# Patient Record
Sex: FEMALE | Race: WHITE | NOT HISPANIC OR LATINO | Employment: FULL TIME | URBAN - METROPOLITAN AREA
[De-identification: names, ages, dates, MRNs, and addresses within clinical notes are randomized per-mention and may not be internally consistent; named-entity substitution may affect disease eponyms.]

---

## 2018-01-15 NOTE — PROGRESS NOTES
Assessment    1  Encounter for preventive health examination (V70 0) (Z00 00)   2  Diabetes mellitus (250 00) (E11 9)    Discussion/Summary    Patient is cleared for driving school bus  She will need diabetic waiver  Chief Complaint  School bus PE      History of Present Illness  HPI: A she comes in for  physical  She is diabetic but is off all medications since losing over 50 pounds in the last 3 months after having gastric sleeve surgery  Review of Systems    Constitutional: No fever, no chills, feels well, no tiredness, no recent weight gain or weight loss  Cardiovascular: No complaints of slow heart rate, no fast heart rate, no chest pain, no palpitations, no leg claudication, no lower extremity edema  Respiratory: No complaints of shortness of breath, no wheezing, no cough, no SOB on exertion, no orthopnea, no PND  Gastrointestinal: No complaints of abdominal pain, no constipation, no nausea or vomiting, no diarrhea, no bloody stools  Surgical History    · History of  Section   · History of Gastrectomy Sleeve Laparoscopic    Family History  Mother    · Family history of mental disorder (V17 0) (Z81 8)   · Family history of substance abuse (V17 0) (Z81 4)    Social History    · Always uses seat belt   · Does not use illicit drugs (Z00 34) (Z78 9)   · Employed   · Exercises regularly   · Former smoker (P55 26) (P73 458)   · Lives with spouse   ·    · No alcohol use   · Primary spoken language English    Current Meds   1  No Reported Medications Recorded    Vitals   Recorded: [id number] 05:54PM   Heart Rate 68   Systolic 999   Diastolic 80   Height 5 ft 4 in   Weight 173 lb    BMI Calculated 29 7   BSA Calculated 1 85   O2 Saturation 98     Physical Exam    Constitutional   General appearance: No acute distress, well appearing and well nourished  Eyes   Conjunctiva and lids: No swelling, erythema or discharge      Pupils and irises: Equal, round, reactive to light     Ophthalmoscopic examination: Normal fundi and optic discs  Ears, Nose, Mouth, and Throat   External inspection of ears and nose: Normal     Otoscopic examination: Tympanic membranes translucent with normal light reflex  Canals patent without erythema  Hearing: Normal     Lips, teeth, and gums: Normal, good dentition  Oropharynx: Normal with no erythema, edema, exudate or lesions  Neck   Neck: Supple, symmetric, trachea midline, no masses  Thyroid: Normal, no thyromegaly  Pulmonary   Respiratory effort: No increased work of breathing or signs of respiratory distress  Auscultation of lungs: Clear to auscultation  Cardiovascular   Auscultation of heart: Normal rate and rhythm, normal S1 and S2, no murmurs  Abdominal aorta: Normal     Pedal pulses: 2+ bilaterally  Examination of extremities for edema and/or varicosities: Normal     Chest   Chest: Normal     Abdomen   Abdomen: Non-tender, no masses  Liver and spleen: No hepatomegaly or splenomegaly  Genitourinary   Uterus: Normal size, no tenderness, no masses  Adnexa/Parametria: Normal, no masses or tenderness  Lymphatic   Palpation of lymph nodes in neck: No lymphadenopathy  Palpation of lymph nodes in other areas: No lymphadenopathy  Musculoskeletal   Gait and station: Normal     Digits and nails: Normal without clubbing or cyanosis  Joints, bones, and muscles: Normal     Range of motion: Normal     Stability: Normal     Muscle strength/tone: Normal     Skin   Skin and subcutaneous tissue: Normal without rashes or lesions  Palpation of skin and subcutaneous tissue: Normal turgor  Neurologic   Cranial nerves: Cranial nerves II-XII intact  Reflexes: 2+ and symmetric  Sensation: No sensory loss  Psychiatric   Judgment and insight: Normal     Orientation to person, place, and time: Normal     Recent and remote memory: Intact      Mood and affect: Normal        Procedure    Procedure:   Results: 20/15 in both eyes without corrective device, 20/20 in the right eye without corrective device, 20/20 in the left eye without corrective device      Signatures   Electronically signed by : ALEXX Owen ; Dec 20 2016  6:19PM EST                       (Author)

## 2018-01-22 ENCOUNTER — ALLSCRIPTS OFFICE VISIT (OUTPATIENT)
Dept: OTHER | Facility: OTHER | Age: 33
End: 2018-01-22

## 2018-01-24 NOTE — PROGRESS NOTES
Assessment    1  Physical examination of employee (V70 5) (Z02 89)    Plan  Physical examination of employee    · Follow-up visit in 1 year Evaluation and Treatment  Follow-up  Status: Hold For -  Scheduling  Requested for: [id number]    Discussion/Summary  healthy adult female Currently, she eats a healthy diet and has an adequate exercise regimen  cervical cancer screening is current Advice and education were given regarding aerobic exercise, calcium supplements, vitamin D supplements and seat belt use  Patient discussion: discussed with the patient  Patient is cleared for driving  Her forms are completed  Of note she had her diabetic and high waiver with her  Those are copy done with her chart  The patient was counseled regarding diagnostic results, instructions for management, risk factor reductions, impressions, risks and benefits of treatment options, importance of compliance with treatment  The treatment plan was reviewed with the patient/guardian  The patient/guardian understands and agrees with the treatment plan     Self Referrals: Yes Endocrinologist      Chief Complaint  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  History of Present Illness  HM, Adult Female: The patient is being seen for a health maintenance evaluation  Social History: Household members include spouse, 3 daughter(s) and 2 son(s)  She is   Work status: working full time  The patient has never smoked cigarettes  She reports never drinking alcohol  She has never used illicit drugs  General Health: The patient's health since the last visit is described as good  She has regular dental visits  She complains of vision problems  Vision care includes having regular eye examinations  Lifestyle:  She consumes a diverse and healthy diet  She exercises regularly  She does not use tobacco  She consumes alcohol  She denies drug use  Reproductive health: the patient is premenopausal   she uses contraception   For contraception, she uses abstinence  breast feeding  Screening:   HPI: 75-year-old female here today for  physical examination  She is off all medications for diabetes at this time  She had a gastric sleeve done  She still sees her endocrinologist had a recent hemoglobin A1c of 6 0  She had a recent eye exam with no diabetic retinopathy  Review of Systems    Constitutional: No fever, no chills, feels well, no tiredness, no recent weight gain or weight loss  Eyes: No complaints of eye pain, no red eyes, no eyesight problems, no discharge, no dry eyes, no itching of eyes  ENT: no complaints of earache, no loss of hearing, no nose bleeds, no nasal discharge, no sore throat, no hoarseness  Cardiovascular: No complaints of slow heart rate, no fast heart rate, no chest pain, no palpitations, no leg claudication, no lower extremity edema  Respiratory: No complaints of shortness of breath, no wheezing, no cough, no SOB on exertion, no orthopnea, no PND  Gastrointestinal: No complaints of abdominal pain, no constipation, no nausea or vomiting, no diarrhea, no bloody stools  Genitourinary: No complaints of dysuria, no incontinence, no pelvic pain, no dysmenorrhea, no vaginal discharge or bleeding  Musculoskeletal: No complaints of arthralgias, no myalgias, no joint swelling or stiffness, no limb pain or swelling  Integumentary: No complaints of skin rash or lesions, no itching, no skin wounds, no breast pain or lump  Neurological: No complaints of headache, no confusion, no convulsions, no numbness, no dizziness or fainting, no tingling, no limb weakness, no difficulty walking  Psychiatric: Not suicidal, no sleep disturbance, no anxiety or depression, no change in personality, no emotional problems  Endocrine: No complaints of proptosis, no hot flashes, no muscle weakness, no deepening of the voice, no feelings of weakness     Hematologic/Lymphatic: No complaints of swollen glands, no swollen glands in the neck, does not bleed easily, does not bruise easily  ROS reviewed  Active Problems    1  Diabetes mellitus (250 00) (E11 9)    Surgical History    · History of  Section   · History of Gastrectomy Sleeve Laparoscopic    Family History  Mother    · Family history of mental disorder (V17 0) (Z81 8)   · Family history of substance abuse (V17 0) (Z81 4)    Social History    · Always uses seat belt   · Does not use illicit drugs (I96 27) (Z78 9)   · Employed   · Exercises regularly   · Former smoker (I02 17) (L49 531)   · Lives with spouse   ·    · No alcohol use   · Primary spoken language English    Current Meds   1  No Reported Medications Recorded    Allergies    1  No Known Drug Allergies    Vitals   Recorded: [id number] 04:24PM   Heart Rate 85   Systolic 915   Diastolic 80   Height 5 ft 4 in   Weight 163 lb    BMI Calculated 27 98   BSA Calculated 1 79   O2 Saturation 98     Physical Exam    Constitutional   General appearance: No acute distress, well appearing and well nourished  Eyes   Conjunctiva and lids: No swelling, erythema or discharge  Pupils and irises: Equal, round and reactive to light  Ears, Nose, Mouth, and Throat   External inspection of ears and nose: Normal     Otoscopic examination: Tympanic membranes translucent with normal light reflex  Canals patent without erythema  Oropharynx: Normal with no erythema, edema, exudate or lesions  Pulmonary   Respiratory effort: No increased work of breathing or signs of respiratory distress  Auscultation of lungs: Clear to auscultation  Cardiovascular   Auscultation of heart: Normal rate and rhythm, normal S1 and S2, without murmurs  Examination of extremities for edema and/or varicosities: Normal     Abdomen   Abdomen: Non-tender, no masses  Liver and spleen: No hepatomegaly or splenomegaly  Lymphatic   Palpation of lymph nodes in neck: No lymphadenopathy      Musculoskeletal   Gait and station: Normal  Digits and nails: Normal without clubbing or cyanosis  Inspection/palpation of joints, bones, and muscles: Normal     Skin   Skin and subcutaneous tissue: Normal without rashes or lesions  Neurologic   Cranial nerves: Cranial nerves 2-12 intact  Reflexes: 2+ and symmetric  Sensation: No sensory loss      Psychiatric   Orientation to person, place, and time: Normal     Mood and affect: Normal        Procedure    Procedure:   Results: 20/20 in both eyes with corrective device, 20/20 in the right eye with corrective device, 20/20 in the left eye with corrective device      Signatures   Electronically signed by : IRMA Gleason; Jan 22 2018  4:43PM EST                       (Author)    Electronically signed by : ALEXX Ny ; Jan 22 2018  9:03PM EST

## 2019-01-14 ENCOUNTER — OFFICE VISIT (OUTPATIENT)
Dept: FAMILY MEDICINE CLINIC | Facility: CLINIC | Age: 34
End: 2019-01-14

## 2019-01-14 VITALS
TEMPERATURE: 98.3 F | DIASTOLIC BLOOD PRESSURE: 64 MMHG | HEART RATE: 95 BPM | OXYGEN SATURATION: 98 % | WEIGHT: 147 LBS | HEIGHT: 64 IN | RESPIRATION RATE: 16 BRPM | BODY MASS INDEX: 25.1 KG/M2 | SYSTOLIC BLOOD PRESSURE: 100 MMHG

## 2019-01-14 DIAGNOSIS — Z00.00 HEALTH MAINTENANCE EXAMINATION: Primary | ICD-10-CM

## 2019-01-14 PROCEDURE — 99499CD: Performed by: FAMILY MEDICINE

## 2019-01-14 NOTE — PROGRESS NOTES
Assessment/Plan:           Problem List Items Addressed This Visit     Health maintenance examination - Primary            Subjective:      Patient ID: Raul Mckeon is a 26 y o  female  Patient comes in for  physical   Since she was here list she has had gastric bypass and has lost a total of 100 lb  The following portions of the patient's history were reviewed and updated as appropriate: allergies, current medications, past family history, past medical history, past social history, past surgical history and problem list     Review of Systems   Constitutional: Negative  HENT: Negative  Respiratory: Negative  Cardiovascular: Negative  Objective:      /64   Pulse 95   Temp 98 3 °F (36 8 °C)   Resp 16   Ht 5' 4" (1 626 m)   Wt 66 7 kg (147 lb)   SpO2 98%   BMI 25 23 kg/m²          Physical Exam   Constitutional: She is oriented to person, place, and time  She appears well-developed  HENT:   Head: Normocephalic and atraumatic  Right Ear: External ear normal    Left Ear: External ear normal    Mouth/Throat: Oropharynx is clear and moist    Eyes: Pupils are equal, round, and reactive to light  EOM are normal    Neck: Neck supple  Cardiovascular: Normal rate, regular rhythm and normal heart sounds  Pulmonary/Chest: Effort normal and breath sounds normal    Abdominal: Soft  Musculoskeletal: Normal range of motion  Neurological: She is alert and oriented to person, place, and time  Skin: Skin is warm and dry  Psychiatric: She has a normal mood and affect

## 2019-10-26 LAB
CREAT ?TM UR-SCNC: 271 UMOL/L
EXT MICROALBUMIN URINE RANDOM: 3.8
EXTERNAL HIV CONFIRMATION: NORMAL
EXTERNAL HIV SCREEN: NORMAL
HCV AB SER-ACNC: NON REACTIVE
MICROALBUMIN/CREAT UR: 14 MG/G{CREAT}

## 2019-12-30 ENCOUNTER — TRANSCRIBE ORDERS (OUTPATIENT)
Dept: LAB | Facility: CLINIC | Age: 34
End: 2019-12-30

## 2019-12-30 ENCOUNTER — APPOINTMENT (OUTPATIENT)
Dept: LAB | Facility: CLINIC | Age: 34
End: 2019-12-30

## 2019-12-30 DIAGNOSIS — Z01.812 PRE-OPERATIVE LABORATORY EXAMINATION: ICD-10-CM

## 2019-12-30 DIAGNOSIS — Z01.812 PRE-OPERATIVE LABORATORY EXAMINATION: Primary | ICD-10-CM

## 2019-12-30 LAB
APTT PPP: 27 SECONDS (ref 23–37)
BASOPHILS # BLD AUTO: 0.05 THOUSANDS/ΜL (ref 0–0.1)
BASOPHILS NFR BLD AUTO: 1 % (ref 0–1)
EOSINOPHIL # BLD AUTO: 0.14 THOUSAND/ΜL (ref 0–0.61)
EOSINOPHIL NFR BLD AUTO: 2 % (ref 0–6)
ERYTHROCYTE [DISTWIDTH] IN BLOOD BY AUTOMATED COUNT: 19.5 % (ref 11.6–15.1)
HCT VFR BLD AUTO: 39.4 % (ref 34.8–46.1)
HGB BLD-MCNC: 11.1 G/DL (ref 11.5–15.4)
IMM GRANULOCYTES # BLD AUTO: 0.01 THOUSAND/UL (ref 0–0.2)
IMM GRANULOCYTES NFR BLD AUTO: 0 % (ref 0–2)
INR PPP: 0.94 (ref 0.84–1.19)
LYMPHOCYTES # BLD AUTO: 3.3 THOUSANDS/ΜL (ref 0.6–4.47)
LYMPHOCYTES NFR BLD AUTO: 49 % (ref 14–44)
MCH RBC QN AUTO: 21.3 PG (ref 26.8–34.3)
MCHC RBC AUTO-ENTMCNC: 28.2 G/DL (ref 31.4–37.4)
MCV RBC AUTO: 76 FL (ref 82–98)
MONOCYTES # BLD AUTO: 0.59 THOUSAND/ΜL (ref 0.17–1.22)
MONOCYTES NFR BLD AUTO: 9 % (ref 4–12)
NEUTROPHILS # BLD AUTO: 2.59 THOUSANDS/ΜL (ref 1.85–7.62)
NEUTS SEG NFR BLD AUTO: 39 % (ref 43–75)
NRBC BLD AUTO-RTO: 0 /100 WBCS
PLATELET # BLD AUTO: 410 THOUSANDS/UL (ref 149–390)
PMV BLD AUTO: 10.3 FL (ref 8.9–12.7)
PROTHROMBIN TIME: 12.2 SECONDS (ref 11.6–14.5)
RBC # BLD AUTO: 5.22 MILLION/UL (ref 3.81–5.12)
WBC # BLD AUTO: 6.68 THOUSAND/UL (ref 4.31–10.16)

## 2019-12-30 PROCEDURE — 85730 THROMBOPLASTIN TIME PARTIAL: CPT

## 2019-12-30 PROCEDURE — 36415 COLL VENOUS BLD VENIPUNCTURE: CPT

## 2019-12-30 PROCEDURE — 85025 COMPLETE CBC W/AUTO DIFF WBC: CPT

## 2019-12-30 PROCEDURE — 85610 PROTHROMBIN TIME: CPT

## 2020-01-06 ENCOUNTER — OFFICE VISIT (OUTPATIENT)
Dept: FAMILY MEDICINE CLINIC | Facility: CLINIC | Age: 35
End: 2020-01-06

## 2020-01-06 VITALS
SYSTOLIC BLOOD PRESSURE: 100 MMHG | HEART RATE: 69 BPM | TEMPERATURE: 98 F | DIASTOLIC BLOOD PRESSURE: 62 MMHG | HEIGHT: 64 IN | WEIGHT: 147 LBS | BODY MASS INDEX: 25.1 KG/M2 | OXYGEN SATURATION: 100 %

## 2020-01-06 DIAGNOSIS — Z02.89 ENCOUNTER FOR PHYSICAL EXAMINATION RELATED TO EMPLOYMENT: Primary | ICD-10-CM

## 2020-01-06 PROBLEM — F31.9 BIPOLAR AFFECTIVE (HCC): Status: ACTIVE | Noted: 2018-04-20

## 2020-01-06 PROBLEM — E11.42 DIABETIC POLYNEUROPATHY (HCC): Status: ACTIVE | Noted: 2018-10-12

## 2020-01-06 PROBLEM — E78.5 HYPERLIPIDEMIA LDL GOAL <70: Status: RESOLVED | Noted: 2018-04-20 | Resolved: 2020-01-06

## 2020-01-06 PROBLEM — E55.9 VITAMIN D DEFICIENCY: Status: ACTIVE | Noted: 2018-04-20

## 2020-01-06 PROBLEM — K21.00 GASTROESOPHAGEAL REFLUX DISEASE WITH ESOPHAGITIS: Status: RESOLVED | Noted: 2018-04-12 | Resolved: 2020-01-06

## 2020-01-06 PROBLEM — E11.9 CONTROLLED TYPE 2 DIABETES MELLITUS WITHOUT COMPLICATION, WITHOUT LONG-TERM CURRENT USE OF INSULIN (HCC): Status: ACTIVE | Noted: 2019-10-21

## 2020-01-06 PROBLEM — E78.5 HYPERLIPIDEMIA LDL GOAL <70: Status: ACTIVE | Noted: 2018-04-20

## 2020-01-06 PROBLEM — E80.4 GILBERT'S SYNDROME: Status: ACTIVE | Noted: 2019-11-23

## 2020-01-06 PROBLEM — K21.00 GASTROESOPHAGEAL REFLUX DISEASE WITH ESOPHAGITIS: Status: ACTIVE | Noted: 2018-04-12

## 2020-01-06 PROCEDURE — 99499 UNLISTED E&M SERVICE: CPT | Performed by: PHYSICIAN ASSISTANT

## 2020-01-06 RX ORDER — PHENAZOPYRIDINE HYDROCHLORIDE 200 MG/1
TABLET, FILM COATED ORAL
Refills: 0 | COMMUNITY
Start: 2019-11-10 | End: 2020-01-06 | Stop reason: ALTCHOICE

## 2020-01-06 RX ORDER — ERGOCALCIFEROL 1.25 MG/1
CAPSULE ORAL
Refills: 0 | COMMUNITY
Start: 2019-12-21

## 2020-01-06 RX ORDER — SELENIUM SULFIDE 22.5 MG/ML
SHAMPOO TOPICAL
Refills: 1 | COMMUNITY
Start: 2019-10-22 | End: 2020-01-06 | Stop reason: ALTCHOICE

## 2020-01-06 RX ORDER — CALCIUM CARBONATE/VITAMIN D3 600 MG-20
1 TABLET ORAL DAILY
Refills: 3 | COMMUNITY
Start: 2019-12-21 | End: 2020-01-06 | Stop reason: ALTCHOICE

## 2020-01-06 RX ORDER — ASCORBIC ACID 250 MG
TABLET ORAL
Refills: 11 | COMMUNITY
Start: 2019-12-16 | End: 2022-04-18

## 2020-01-06 RX ORDER — FERROUS SULFATE 325(65) MG
TABLET ORAL
Refills: 3 | COMMUNITY
Start: 2019-12-16

## 2020-01-06 RX ORDER — PREGABALIN 150 MG/1
150 CAPSULE ORAL 2 TIMES DAILY
Refills: 5 | COMMUNITY
Start: 2019-12-24 | End: 2022-01-31

## 2020-01-06 NOTE — PROGRESS NOTES
Assessment/Plan:          Diagnoses and all orders for this visit:    Encounter for physical examination related to employment    Other orders  -     [facility] VITAMIN C 250 MG tablet; TAKE 1 TABLET BY MOUTH TWICE A DAY WITH IRON  -     Discontinue: [facility] D3 50 MCG (2000 UT) CAPS; Take 1 capsule by mouth daily  -     ergocalciferol (VITAMIN D2) 50,000 units; TAKE 1 CAPSULE BY MOUTH ONE TIME PER WEEK  -     ferrous sulfate 325 (65 Fe) mg tablet; TAKE 1 TABLET BY MOUTH TWICE A DAY WITH A MEAL AND VITAMIN C 250 MG  -     Discontinue: phenazopyridine (PYRIDIUM) 200 mg tablet; TAKE 1 TABLET (200 MG TOTAL) BY MOUTH 3 (THREE) TIMES A DAY AS NEEDED FOR BLADDER SPASMS  -     pregabalin (LYRICA) 150 mg capsule; Take 150 mg by mouth 2 (two) times a day  -     Discontinue: Selenium Sulfide 2 25 % SHAM; LATHER UP AND LEAVE ON FOR 10 MINUTES PRIOR TO WASHING OFF ONCE DAILY FOR A WEEK, REPEAT IF OCCURS            Subjective:      Patient ID: Loyde Hodgkin is a 37 y o  female  Pt is here for  PE  She is no longer on any medications for Dm, Hyperlipidemia or GERD  Patient had gastric bypass surgery and loss significant weight  She denies any chest pain, shortness of breath or dizziness  The following portions of the patient's history were reviewed and updated as appropriate:   She has a past medical history of Diabetes mellitus ([facility] [address] )  ,  does not have any pertinent problems on file  ,   has a past surgical history that includes Gastric bypass and  section  ,  family history includes Drug abuse in her mother; Mental illness in her mother  ,   reports that she has quit smoking  She has never used smokeless tobacco  She reports that she does not drink alcohol or use drugs  ,  has No Known Allergies     Current Outpatient Medications   Medication Sig Dispense Refill    [facility]S VITAMIN C 250 MG tablet TAKE 1 TABLET BY MOUTH TWICE A DAY WITH IRON  11    ergocalciferol (VITAMIN D2) 50,000 units TAKE 1 CAPSULE BY MOUTH ONE TIME PER WEEK  0    pregabalin (LYRICA) 150 mg capsule Take 150 mg by mouth 2 (two) times a day  5    ferrous sulfate 325 (65 Fe) mg tablet TAKE 1 TABLET BY MOUTH TWICE A DAY WITH A MEAL AND VITAMIN C 250 MG  3     No current facility-administered medications for this visit  Review of Systems   Constitutional: Negative for activity change, appetite change, chills, fatigue and fever  HENT: Negative for congestion, dental problem, facial swelling, postnasal drip, rhinorrhea, sinus pressure, sore throat and trouble swallowing  Eyes: Negative for pain, discharge and visual disturbance  Respiratory: Negative for cough, chest tightness and shortness of breath  Cardiovascular: Negative for chest pain, palpitations and leg swelling  Gastrointestinal: Negative for abdominal pain, constipation, diarrhea and nausea  Genitourinary: Negative for decreased urine volume, difficulty urinating, dysuria and flank pain  Musculoskeletal: Negative for back pain, gait problem and neck pain  Skin: Negative for rash  Neurological: Negative for dizziness, tremors, syncope, weakness, light-headedness and headaches  Psychiatric/Behavioral: Negative for confusion, decreased concentration, self-injury, sleep disturbance and suicidal ideas  The patient is not nervous/anxious  Objective:  Vitals:    01/06/20 1646   BP: 100/62   Pulse: 69   Temp: 98 °F (36 7 °C)   SpO2: 100%   Weight: 66 7 kg (147 lb)   Height: 5' 4 17" (1 63 m)     Body mass index is 25 1 kg/m²  Physical Exam   Constitutional: She is oriented to person, place, and time  She appears well-developed and well-nourished  HENT:   Head: Normocephalic     Right Ear: Hearing, tympanic membrane, external ear and ear canal normal    Left Ear: Hearing, tympanic membrane and external ear normal    Nose: Nose normal        Mouth/Throat: Uvula is midline, oropharynx is clear and moist and mucous membranes are normal    Eyes: Pupils are equal, round, and reactive to light  Conjunctivae, EOM and lids are normal    Peripheral vision is normal    Neck: Normal range of motion  No spinous process tenderness present  Carotid bruit is not present  No neck rigidity  No thyromegaly present  Cardiovascular: Normal rate, regular rhythm, normal heart sounds and intact distal pulses  Pulmonary/Chest: Effort normal and breath sounds normal    Abdominal: Soft  Bowel sounds are normal  She exhibits no mass  There is no tenderness  Musculoskeletal: Normal range of motion  Lymphadenopathy:     She has no cervical adenopathy  Neurological: She is alert and oriented to person, place, and time  She has normal reflexes  She displays normal reflexes  She exhibits normal muscle tone  Coordination normal    Skin: Skin is dry  No rash noted  No erythema  Psychiatric: She has a normal mood and affect  Her behavior is normal  Judgment and thought content normal    Nursing note and vitals reviewed

## 2020-01-08 ENCOUNTER — ANESTHESIA EVENT (OUTPATIENT)
Dept: PERIOP | Facility: HOSPITAL | Age: 35
End: 2020-01-08
Payer: SELF-PAY

## 2020-01-08 NOTE — PRE-PROCEDURE INSTRUCTIONS
Pre-Surgery Instructions:   Medication Instructions    pregabalin (LYRICA) 150 mg capsule Instructed patient per Anesthesia Guidelines  Pre-op Showering Instructions for Surgery Patients    Before your operation, you play an important role in decreasing your risk for infection by washing with special antiseptic soap  This is an effective way to reduce bacteria on the skin which may help to prevent infections at the surgical site  Please read the following directions in advance  1  In the week before your operation, purchase a 4 ounce bottle of antiseptic soap containing chlorhexidine gluconate (CHG)  4%  Some brand names include: Aplicare®, Endure, and Hibiclens®  The cost is usually less than $5 00   For your convenience, the [facility] carries the soap   It may also be available at your doctors office or pre-admission testing center, and at most retail pharmacies   If you are allergic or sensitive to soaps containing CHG, please let your doctor know so another antiseptic can be suggested   CHG antiseptic soap is for external use only  2  The day before your operation, follow these instructions carefully to get ready   Please clean linens (sheets) on your bed; you should sleep on clean sheets after your evening shower   Get clean towels and washcloth ready - you need enough for 2 showers   Set aside clean underwear, pajamas, and clothing to wear after the showers     Reminders:   DO NOT use any other soap or body rinse on your skin during or after the antiseptic showers   DO NOT use lotion, powder, deodorant, or perfume/aftershave of any kind on your skin after your antiseptic shower   DO NOT shave any body parts in the 24 hours/day before your operation   DO NOT get the antiseptic soap in your eyes, ears, nose, mouth, or vaginal area    3   You will need to shower the night before AND the morning of your surgery  Shower 1:   The first evening before the operation, take the first shower   First, shampoo your hair with regular shampoo and rinse it completely before you use the antiseptic soap  After washing and rinsing your hair, rinse your body   Next, use a clean washcloth to apply the antiseptic soap and wash your body from the neck down to your toes using ½ bottle of the antiseptic soap   You will use the other ½ bottle for the second shower   Clean the area where your incision will be; lather this area well for about 2 minutes   If you are having head or neck surgery, wash areas with the antiseptic soap   Rinse yourself completely with running water   Use a clean towel to dry off   Wear clean underwear and clothing/pajamas  Shower 2   The morning of your operation, take the second shower following the same steps as Shower 1 using the second ½ of the bottle of antiseptic soap   Use clean cloths and towels to wash and dry yourself   Wear clean underwear and clothing

## 2020-01-08 NOTE — PRE-PROCEDURE INSTRUCTIONS
Pre-Surgery Instructions:   Medication Instructions    pregabalin (LYRICA) 150 mg capsule Instructed patient per Anesthesia Guidelines  Pre-op Showering Instructions for Surgery Patients    Before your operation, you play an important role in decreasing your risk for infection by washing with special antiseptic soap  This is an effective way to reduce bacteria on the skin which may help to prevent infections at the surgical site  Please read the following directions in advance  1  In the week before your operation, purchase a 4 ounce bottle of antiseptic soap containing chlorhexidine gluconate (CHG)  4%  Some brand names include: Aplicare®, Endure, and Hibiclens®  The cost is usually less than $5 00   For your convenience, the [employer] carries the soap   It may also be available at your doctors office or pre-admission testing center, and at most retail pharmacies   If you are allergic or sensitive to soaps containing CHG, please let your doctor know so another antiseptic can be suggested   CHG antiseptic soap is for external use only  2  The day before your operation, follow these instructions carefully to get ready   Please clean linens (sheets) on your bed; you should sleep on clean sheets after your evening shower   Get clean towels and washcloth ready - you need enough for 2 showers   Set aside clean underwear, pajamas, and clothing to wear after the showers     Reminders:   DO NOT use any other soap or body rinse on your skin during or after the antiseptic showers   DO NOT use lotion, powder, deodorant, or perfume/aftershave of any kind on your skin after your antiseptic shower   DO NOT shave any body parts in the 24 hours/day before your operation   DO NOT get the antiseptic soap in your eyes, ears, nose, mouth, or vaginal area    3   You will need to shower the night before AND the morning of your surgery  Shower 1:   The first evening before the operation, take the first shower   First, shampoo your hair with regular shampoo and rinse it completely before you use the antiseptic soap  After washing and rinsing your hair, rinse your body   Next, use a clean washcloth to apply the antiseptic soap and wash your body from the neck down to your toes using ½ bottle of the antiseptic soap   You will use the other ½ bottle for the second shower   Clean the area where your incision will be; lather this area well for about 2 minutes   If you are having head or neck surgery, wash areas with the antiseptic soap   Rinse yourself completely with running water   Use a clean towel to dry off   Wear clean underwear and clothing/pajamas  Shower 2   The morning of your operation, take the second shower following the same steps as Shower 1 using the second ½ of the bottle of antiseptic soap   Use clean cloths and towels to wash and dry yourself   Wear clean underwear and clothing

## 2020-01-09 ENCOUNTER — ANESTHESIA (OUTPATIENT)
Dept: PERIOP | Facility: HOSPITAL | Age: 35
End: 2020-01-09
Payer: SELF-PAY

## 2020-01-09 ENCOUNTER — HOSPITAL ENCOUNTER (OUTPATIENT)
Facility: HOSPITAL | Age: 35
Setting detail: OBSERVATION
Discharge: HOME | End: 2020-01-10
Attending: PLASTIC SURGERY | Admitting: PLASTIC SURGERY
Payer: SELF-PAY

## 2020-01-09 DIAGNOSIS — E88.1 LIPODYSTROPHY: Primary | ICD-10-CM

## 2020-01-09 LAB
EXT PREGNANCY TEST URINE: NEGATIVE
EXT. CONTROL: NORMAL

## 2020-01-09 PROCEDURE — G0379 DIRECT REFER HOSPITAL OBSERV: HCPCS

## 2020-01-09 PROCEDURE — 81025 URINE PREGNANCY TEST: CPT | Performed by: PLASTIC SURGERY

## 2020-01-09 RX ORDER — VECURONIUM BROMIDE 1 MG/ML
INJECTION, POWDER, LYOPHILIZED, FOR SOLUTION INTRAVENOUS AS NEEDED
Status: DISCONTINUED | OUTPATIENT
Start: 2020-01-09 | End: 2020-01-09 | Stop reason: SURG

## 2020-01-09 RX ORDER — DIPHENHYDRAMINE HYDROCHLORIDE 50 MG/ML
25 INJECTION INTRAMUSCULAR; INTRAVENOUS EVERY 6 HOURS PRN
Status: DISCONTINUED | OUTPATIENT
Start: 2020-01-09 | End: 2020-01-10 | Stop reason: HOSPADM

## 2020-01-09 RX ORDER — GLYCOPYRROLATE 0.2 MG/ML
INJECTION INTRAMUSCULAR; INTRAVENOUS AS NEEDED
Status: DISCONTINUED | OUTPATIENT
Start: 2020-01-09 | End: 2020-01-09 | Stop reason: SURG

## 2020-01-09 RX ORDER — PROMETHAZINE HYDROCHLORIDE 25 MG/ML
12.5 INJECTION, SOLUTION INTRAMUSCULAR; INTRAVENOUS ONCE AS NEEDED
Status: DISCONTINUED | OUTPATIENT
Start: 2020-01-09 | End: 2020-01-09 | Stop reason: HOSPADM

## 2020-01-09 RX ORDER — DEXTROSE MONOHYDRATE AND SODIUM CHLORIDE 5; .45 G/100ML; G/100ML
125 INJECTION, SOLUTION INTRAVENOUS CONTINUOUS
Status: DISCONTINUED | OUTPATIENT
Start: 2020-01-09 | End: 2020-01-10 | Stop reason: HOSPADM

## 2020-01-09 RX ORDER — HYDROMORPHONE HCL/PF 1 MG/ML
0.5 SYRINGE (ML) INJECTION
Status: DISCONTINUED | OUTPATIENT
Start: 2020-01-09 | End: 2020-01-09 | Stop reason: HOSPADM

## 2020-01-09 RX ORDER — HYDROMORPHONE HCL/PF 1 MG/ML
1 SYRINGE (ML) INJECTION EVERY 2 HOUR PRN
Status: DISCONTINUED | OUTPATIENT
Start: 2020-01-09 | End: 2020-01-10 | Stop reason: HOSPADM

## 2020-01-09 RX ORDER — CEFAZOLIN SODIUM 1 G/50ML
1000 SOLUTION INTRAVENOUS EVERY 8 HOURS
Status: COMPLETED | OUTPATIENT
Start: 2020-01-09 | End: 2020-01-10

## 2020-01-09 RX ORDER — FENTANYL CITRATE 50 UG/ML
INJECTION, SOLUTION INTRAMUSCULAR; INTRAVENOUS AS NEEDED
Status: DISCONTINUED | OUTPATIENT
Start: 2020-01-09 | End: 2020-01-09 | Stop reason: SURG

## 2020-01-09 RX ORDER — ROCURONIUM BROMIDE 10 MG/ML
INJECTION, SOLUTION INTRAVENOUS AS NEEDED
Status: DISCONTINUED | OUTPATIENT
Start: 2020-01-09 | End: 2020-01-09 | Stop reason: SURG

## 2020-01-09 RX ORDER — NEOSTIGMINE METHYLSULFATE 1 MG/ML
INJECTION INTRAVENOUS AS NEEDED
Status: DISCONTINUED | OUTPATIENT
Start: 2020-01-09 | End: 2020-01-09 | Stop reason: SURG

## 2020-01-09 RX ORDER — DIPHENHYDRAMINE HYDROCHLORIDE 50 MG/ML
INJECTION INTRAMUSCULAR; INTRAVENOUS AS NEEDED
Status: DISCONTINUED | OUTPATIENT
Start: 2020-01-09 | End: 2020-01-09 | Stop reason: SURG

## 2020-01-09 RX ORDER — HYDROMORPHONE HYDROCHLORIDE 2 MG/ML
INJECTION, SOLUTION INTRAMUSCULAR; INTRAVENOUS; SUBCUTANEOUS AS NEEDED
Status: DISCONTINUED | OUTPATIENT
Start: 2020-01-09 | End: 2020-01-09 | Stop reason: SURG

## 2020-01-09 RX ORDER — ONDANSETRON 2 MG/ML
4 INJECTION INTRAMUSCULAR; INTRAVENOUS EVERY 6 HOURS PRN
Status: DISCONTINUED | OUTPATIENT
Start: 2020-01-09 | End: 2020-01-10 | Stop reason: HOSPADM

## 2020-01-09 RX ORDER — LIDOCAINE HYDROCHLORIDE AND EPINEPHRINE 5; 5 MG/ML; UG/ML
INJECTION, SOLUTION INFILTRATION; PERINEURAL AS NEEDED
Status: DISCONTINUED | OUTPATIENT
Start: 2020-01-09 | End: 2020-01-09 | Stop reason: HOSPADM

## 2020-01-09 RX ORDER — CEFAZOLIN SODIUM 1 G/50ML
1000 SOLUTION INTRAVENOUS ONCE
Status: COMPLETED | OUTPATIENT
Start: 2020-01-09 | End: 2020-01-09

## 2020-01-09 RX ORDER — MAGNESIUM HYDROXIDE 1200 MG/15ML
LIQUID ORAL AS NEEDED
Status: DISCONTINUED | OUTPATIENT
Start: 2020-01-09 | End: 2020-01-09 | Stop reason: HOSPADM

## 2020-01-09 RX ORDER — MEPERIDINE HYDROCHLORIDE 25 MG/ML
12.5 INJECTION INTRAMUSCULAR; INTRAVENOUS; SUBCUTANEOUS
Status: DISCONTINUED | OUTPATIENT
Start: 2020-01-09 | End: 2020-01-09 | Stop reason: HOSPADM

## 2020-01-09 RX ORDER — SCOLOPAMINE TRANSDERMAL SYSTEM 1 MG/1
1 PATCH, EXTENDED RELEASE TRANSDERMAL
Status: DISCONTINUED | OUTPATIENT
Start: 2020-01-09 | End: 2020-01-10 | Stop reason: HOSPADM

## 2020-01-09 RX ORDER — SODIUM CHLORIDE, SODIUM LACTATE, POTASSIUM CHLORIDE, CALCIUM CHLORIDE 600; 310; 30; 20 MG/100ML; MG/100ML; MG/100ML; MG/100ML
75 INJECTION, SOLUTION INTRAVENOUS CONTINUOUS
Status: DISCONTINUED | OUTPATIENT
Start: 2020-01-09 | End: 2020-01-10 | Stop reason: HOSPADM

## 2020-01-09 RX ORDER — ONDANSETRON 2 MG/ML
INJECTION INTRAMUSCULAR; INTRAVENOUS AS NEEDED
Status: DISCONTINUED | OUTPATIENT
Start: 2020-01-09 | End: 2020-01-09 | Stop reason: SURG

## 2020-01-09 RX ORDER — PROPOFOL 10 MG/ML
INJECTION, EMULSION INTRAVENOUS AS NEEDED
Status: DISCONTINUED | OUTPATIENT
Start: 2020-01-09 | End: 2020-01-09 | Stop reason: SURG

## 2020-01-09 RX ORDER — DEXAMETHASONE SODIUM PHOSPHATE 4 MG/ML
INJECTION, SOLUTION INTRA-ARTICULAR; INTRALESIONAL; INTRAMUSCULAR; INTRAVENOUS; SOFT TISSUE AS NEEDED
Status: DISCONTINUED | OUTPATIENT
Start: 2020-01-09 | End: 2020-01-09 | Stop reason: SURG

## 2020-01-09 RX ORDER — ONDANSETRON 2 MG/ML
4 INJECTION INTRAMUSCULAR; INTRAVENOUS ONCE AS NEEDED
Status: DISCONTINUED | OUTPATIENT
Start: 2020-01-09 | End: 2020-01-09 | Stop reason: HOSPADM

## 2020-01-09 RX ORDER — TRAMADOL HYDROCHLORIDE 50 MG/1
50 TABLET ORAL EVERY 6 HOURS PRN
Status: DISCONTINUED | OUTPATIENT
Start: 2020-01-09 | End: 2020-01-10 | Stop reason: HOSPADM

## 2020-01-09 RX ADMIN — LIDOCAINE HYDROCHLORIDE 50 MG: 20 INJECTION, SOLUTION INTRAVENOUS at 08:04

## 2020-01-09 RX ADMIN — ONDANSETRON 4 MG: 2 INJECTION INTRAMUSCULAR; INTRAVENOUS at 13:04

## 2020-01-09 RX ADMIN — VECURONIUM BROMIDE 2 MG: 1 INJECTION, POWDER, LYOPHILIZED, FOR SOLUTION INTRAVENOUS at 08:46

## 2020-01-09 RX ADMIN — DIPHENHYDRAMINE HYDROCHLORIDE 25 MG: 50 INJECTION INTRAMUSCULAR; INTRAVENOUS at 08:24

## 2020-01-09 RX ADMIN — CEFAZOLIN SODIUM 1000 MG: 1 SOLUTION INTRAVENOUS at 20:17

## 2020-01-09 RX ADMIN — HYDROMORPHONE HYDROCHLORIDE 0.5 MG: 1 INJECTION, SOLUTION INTRAMUSCULAR; INTRAVENOUS; SUBCUTANEOUS at 14:25

## 2020-01-09 RX ADMIN — VECURONIUM BROMIDE 2 MG: 1 INJECTION, POWDER, LYOPHILIZED, FOR SOLUTION INTRAVENOUS at 11:59

## 2020-01-09 RX ADMIN — SCOPALAMINE 1 PATCH: 1 PATCH, EXTENDED RELEASE TRANSDERMAL at 07:02

## 2020-01-09 RX ADMIN — CEFAZOLIN SODIUM 1000 MG: 1 SOLUTION INTRAVENOUS at 08:10

## 2020-01-09 RX ADMIN — DEXTROSE AND SODIUM CHLORIDE 125 ML/HR: 5; .45 INJECTION, SOLUTION INTRAVENOUS at 21:56

## 2020-01-09 RX ADMIN — FAMOTIDINE 20 MG: 10 INJECTION INTRAVENOUS at 08:29

## 2020-01-09 RX ADMIN — CEFAZOLIN SODIUM 1000 MG: 1 SOLUTION INTRAVENOUS at 12:10

## 2020-01-09 RX ADMIN — SODIUM CHLORIDE, SODIUM LACTATE, POTASSIUM CHLORIDE, AND CALCIUM CHLORIDE: .6; .31; .03; .02 INJECTION, SOLUTION INTRAVENOUS at 07:31

## 2020-01-09 RX ADMIN — VECURONIUM BROMIDE 2 MG: 1 INJECTION, POWDER, LYOPHILIZED, FOR SOLUTION INTRAVENOUS at 09:50

## 2020-01-09 RX ADMIN — ROCURONIUM BROMIDE 50 MG: 10 INJECTION, SOLUTION INTRAVENOUS at 08:06

## 2020-01-09 RX ADMIN — DEXTROSE AND SODIUM CHLORIDE 125 ML/HR: 5; .45 INJECTION, SOLUTION INTRAVENOUS at 15:00

## 2020-01-09 RX ADMIN — VECURONIUM BROMIDE 2 MG: 1 INJECTION, POWDER, LYOPHILIZED, FOR SOLUTION INTRAVENOUS at 10:59

## 2020-01-09 RX ADMIN — FENTANYL CITRATE 50 MCG: 50 INJECTION, SOLUTION INTRAMUSCULAR; INTRAVENOUS at 09:04

## 2020-01-09 RX ADMIN — FENTANYL CITRATE 50 MCG: 50 INJECTION, SOLUTION INTRAMUSCULAR; INTRAVENOUS at 11:01

## 2020-01-09 RX ADMIN — HYDROMORPHONE HYDROCHLORIDE 1 MG: 1 INJECTION, SOLUTION INTRAMUSCULAR; INTRAVENOUS; SUBCUTANEOUS at 17:45

## 2020-01-09 RX ADMIN — HYDROMORPHONE HYDROCHLORIDE 1 MG: 2 INJECTION, SOLUTION INTRAMUSCULAR; INTRAVENOUS; SUBCUTANEOUS at 13:40

## 2020-01-09 RX ADMIN — HYDROMORPHONE HYDROCHLORIDE 0.5 MG: 1 INJECTION, SOLUTION INTRAMUSCULAR; INTRAVENOUS; SUBCUTANEOUS at 14:37

## 2020-01-09 RX ADMIN — HYDROMORPHONE HYDROCHLORIDE 1 MG: 2 INJECTION, SOLUTION INTRAMUSCULAR; INTRAVENOUS; SUBCUTANEOUS at 13:50

## 2020-01-09 RX ADMIN — FENTANYL CITRATE 100 MCG: 50 INJECTION, SOLUTION INTRAMUSCULAR; INTRAVENOUS at 08:05

## 2020-01-09 RX ADMIN — GLYCOPYRROLATE 0.4 MG: 0.2 INJECTION, SOLUTION INTRAMUSCULAR; INTRAVENOUS at 13:22

## 2020-01-09 RX ADMIN — SCOPALAMINE 1 PATCH: 1 PATCH, EXTENDED RELEASE TRANSDERMAL at 06:45

## 2020-01-09 RX ADMIN — DEXAMETHASONE SODIUM PHOSPHATE 4 MG: 4 INJECTION, SOLUTION INTRAMUSCULAR; INTRAVENOUS at 08:30

## 2020-01-09 RX ADMIN — FENTANYL CITRATE 50 MCG: 50 INJECTION, SOLUTION INTRAMUSCULAR; INTRAVENOUS at 08:43

## 2020-01-09 RX ADMIN — FENTANYL CITRATE 50 MCG: 50 INJECTION, SOLUTION INTRAMUSCULAR; INTRAVENOUS at 11:48

## 2020-01-09 RX ADMIN — NEOSTIGMINE METHYLSULFATE 3 MG: 1 INJECTION INTRAVENOUS at 13:22

## 2020-01-09 RX ADMIN — PROPOFOL 200 MG: 10 INJECTION, EMULSION INTRAVENOUS at 08:05

## 2020-01-09 RX ADMIN — FENTANYL CITRATE 50 MCG: 50 INJECTION, SOLUTION INTRAMUSCULAR; INTRAVENOUS at 13:03

## 2020-01-09 RX ADMIN — DIPHENHYDRAMINE HYDROCHLORIDE 25 MG: 50 INJECTION INTRAMUSCULAR; INTRAVENOUS at 15:29

## 2020-01-09 RX ADMIN — DEXAMETHASONE SODIUM PHOSPHATE 4 MG: 4 INJECTION, SOLUTION INTRAMUSCULAR; INTRAVENOUS at 13:05

## 2020-01-09 NOTE — OP NOTE
OPERATIVE REPORT  PATIENT NAME: Nellie Roth    :  1985  MRN: [id number]  Pt Location:  OR ROOM 07    SURGERY DATE: 2020    Surgeon(s) and Role:     * Edu Gerardo MD - Primary     * SHOAIB Wild - Assisting    Preop Diagnosis:  Encounter for cosmetic surgery [Z41 1]    Post-Op Diagnosis Codes:     * Encounter for cosmetic surgery [Z41 1]    Procedure(s) (LRB):  ABDOMINALPLASTY,CIRCUMFERENTIAL (N/A)    Specimen(s):  * No specimens in log *    Estimated Blood Loss:   Minimal    Drains:  Closed/Suction Drain Right;Posterior Hip 19 Fr  (Active)   Drainage Appearance Bloody 2020  1:48 PM   Status To bulb suction 2020  1:48 PM   Number of days: 0       Closed/Suction Drain Left;Posterior Hip Bulb 19 Fr  (Active)   Drainage Appearance Bloody 2020  1:48 PM   Status To bulb suction 2020  1:48 PM   Number of days: 0       Closed/Suction Drain Right Abdomen Bulb 19 Fr  (Active)   Drainage Appearance Bloody 2020  1:48 PM   Status To bulb suction 2020  1:48 PM   Number of days: 0       Urethral Catheter Latex 16 Fr  (Active)   Collection Container Standard drainage bag 2020  1:48 PM   Securement Method Securing device (Describe) 2020  1:48 PM   Number of days: 0       Anesthesia Type:   General    Operative Indications:  Encounter for cosmetic surgery [Z41 1]  Status post massive weight loss with lipodystrophy of the abdomen    Operative Findings:  Normal    Complications:   None    Procedure and Technique:  Patient was marked in the holding area for circumferential abdominoplasty draped and prepped in normal sterile fashion placed in the prone position  The premarked areas were excised hemostasis achieved electrocautery and suture ligation  Fascia was approximated with oa Tycron suture dermis with 2-0 Vicryl suture and skin with a running subcuticular 3-0 Prolene suture prior to closure 2 19   Channel drains were placed through the flank anchored in place with 3-0 nylon suture  Patient was placed in the supine position redraped and prepped in normal sterile fashion  The remaining abdominal pannus was removed  The midline was plicated with 0 Tycron sutures in a figure-eight fashion the flap was approximated with [place] [employer] for fascia 2-0 Vicryl for dermis and a running subcuticular 3-0 Prolene suture for skin the umbilicus was delivered through a separate stab wound in the midline anchored in place with 4-0 Vicryl buried sutures and 5 0 nylon half buried mattress sutures  Soft dressing and a compression bra were placed note that prior to closure a 19  Channel drain was placed anteriorly and brought out through the right flank anchored in place with 3-0 nylon suture     I was present for the entire procedure    Patient Disposition:  PACU     SIGNATURE: Henrique Phoenix MD  DATE: January 9, 2020  TIME: 2:26 PM

## 2020-01-09 NOTE — H&P
H&P Exam - Corbin  37 y o  female MRN: [id number]    Unit/Bed#:  Encounter: [id number]    Assessment:  Lipodystrophy of the abdomen    Plan:  Circumferential abdominal plasty    History of Present Illness   This is a 38-year-old female with a history of massive weight loss  Patient 1st had a gastric sleeve in 2017 which was revised to a Jb-en-Y after the patient had severe gastric reflux  Patient's maximum weight was 260 lb  Patient has lost over 100 lb  Patient has lipodystrophy of the abdomen waist and thighs  Review of Systems   All other systems reviewed and are negative  Historical Information   Past Medical History:   Diagnosis Date    Anemia     iron def    Chronic pain disorder     feet    Depression     Diabetes mellitus (HCC)     neg since gastric bypass    Gilbert's syndrome     Neuropathy     feet sheila    Vitamin D deficiency      Past Surgical History:   Procedure Laterality Date     SECTION      X3    GASTRIC BYPASS       Social History   Social History     Substance and Sexual Activity   Alcohol Use No     Social History     Substance and Sexual Activity   Drug Use No     Social History     Tobacco Use   Smoking Status Former Smoker   Smokeless Tobacco Never Used     Family History: non-contributory    Meds/Allergies   all medications and allergies reviewed  No Known Allergies    Objective   First Vitals:        Current Vitals:        No intake or output data in the 24 hours ending 20 1916    Invasive Devices     None                 Physical Exam examination of the head ears eyes nose and throat is within normal limits heart sounds S1-S2 heard no murmurs or gallops lungs clear abdomen soft extremities within normal limits patient has 3  scars as well as laparoscopic scars of her abdomen      Lab Results:   Imaging:   EKG, Pathology, and Other Studies:     Code Status: No Order  Advance Directive and Living Will:      Power of :    POLST: Counseling / Coordination of Care:   None

## 2020-01-09 NOTE — INTERVAL H&P NOTE
H&P reviewed  After examining the patient I find no changes in the patients condition since the H&P had been written      Vitals:    01/09/20 0618   BP: 115/74   Pulse: 76   Resp: 20   Temp: 97 8 °F (36 6 °C)   SpO2: 100%

## 2020-01-09 NOTE — ANESTHESIA POSTPROCEDURE EVALUATION
Post-Op Assessment Note    CV Status:  Stable  Pain Score: 2    Pain management: adequate     Mental Status:  Alert and awake   Hydration Status:  Euvolemic   PONV Controlled:  Controlled   Airway Patency:  Patent   Post Op Vitals Reviewed: Yes      Staff: Anesthesiologist           /63 (01/09/20 1348)    Temp 98 4 °F (36 9 °C) (01/09/20 1348)    Pulse 97 (01/09/20 1348)   Resp 13 (01/09/20 1348)    SpO2 100 % (01/09/20 1348)

## 2020-01-09 NOTE — PLAN OF CARE
Problem: Prexisting or High Potential for Compromised Skin Integrity  Goal: Skin integrity is maintained or improved  Description  INTERVENTIONS:  - Identify patients at risk for skin breakdown  - Assess and monitor skin integrity  - Assess and monitor nutrition and hydration status  - Monitor labs   - Assess for incontinence   - Turn and reposition patient  - Assist with mobility/ambulation  - Relieve pressure over bony prominences  - Avoid friction and shearing  - Provide appropriate hygiene as needed including keeping skin clean and dry  - Evaluate need for skin moisturizer/barrier cream  - Collaborate with interdisciplinary team   - Patient/family teaching  - Consider wound care consult   Outcome: Progressing     Problem: Potential for Falls  Goal: Patient will remain free of falls  Description  INTERVENTIONS:  - Assess patient frequently for physical needs  -  Identify cognitive and physical deficits and behaviors that affect risk of falls    -  [place] fall precautions as indicated by assessment   - Educate patient/family on patient safety including physical limitations  - Instruct patient to call for assistance with activity based on assessment  - Modify environment to reduce risk of injury  - Consider OT/PT consult to assist with strengthening/mobility  Outcome: Progressing     Problem: PAIN - ADULT  Goal: Verbalizes/displays adequate comfort level or baseline comfort level  Description  Interventions:  - Encourage patient to monitor pain and request assistance  - Assess pain using appropriate pain scale  - Administer analgesics based on type and severity of pain and evaluate response  - Implement non-pharmacological measures as appropriate and evaluate response  - Consider cultural and social influences on pain and pain management  - Notify physician/advanced practitioner if interventions unsuccessful or patient reports new pain  Outcome: Progressing     Problem: INFECTION - ADULT  Goal: Absence or prevention of progression during hospitalization  Description  INTERVENTIONS:  - Assess and monitor for signs and symptoms of infection  - Monitor lab/diagnostic results  - Monitor all insertion sites, i e  indwelling lines, tubes, and drains  - Monitor endotracheal if appropriate and nasal secretions for changes in amount and color  - [place] appropriate cooling/warming therapies per order  - Administer medications as ordered  - Instruct and encourage patient and family to use good hand hygiene technique  - Identify and instruct in appropriate isolation precautions for identified infection/condition  Outcome: Progressing  Goal: Absence of fever/infection during neutropenic period  Description  INTERVENTIONS:  - Monitor WBC    Outcome: Progressing     Problem: SAFETY ADULT  Goal: Patient will remain free of falls  Description  INTERVENTIONS:  - Assess patient frequently for physical needs  -  Identify cognitive and physical deficits and behaviors that affect risk of falls    -  [place] fall precautions as indicated by assessment   - Educate patient/family on patient safety including physical limitations  - Instruct patient to call for assistance with activity based on assessment  - Modify environment to reduce risk of injury  - Consider OT/PT consult to assist with strengthening/mobility  Outcome: Progressing  Goal: Maintain or return to baseline ADL function  Description  INTERVENTIONS:  -  Assess patient's ability to carry out ADLs; assess patient's baseline for ADL function and identify physical deficits which impact ability to perform ADLs (bathing, care of mouth/teeth, toileting, grooming, dressing, etc )  - Assess/evaluate cause of self-care deficits   - Assess range of motion  - Assess patient's mobility; develop plan if impaired  - Assess patient's need for assistive devices and provide as appropriate  - Encourage maximum independence but intervene and supervise when necessary  - Involve family in performance of ADLs  - Assess for home care needs following discharge   - Consider OT consult to assist with ADL evaluation and planning for discharge  - Provide patient education as appropriate  Outcome: Progressing  Goal: Maintain or return mobility status to optimal level  Description  INTERVENTIONS:  - Assess patient's baseline mobility status (ambulation, transfers, stairs, etc )    - Identify cognitive and physical deficits and behaviors that affect mobility  - Identify mobility aids required to assist with transfers and/or ambulation (gait belt, sit-to-stand, lift, walker, cane, etc )  - [place] fall precautions as indicated by assessment  - Record patient progress and toleration of activity level on Mobility SBAR; progress patient to next Phase/Stage  - Instruct patient to call for assistance with activity based on assessment  - Consider rehabilitation consult to assist with strengthening/weightbearing, etc   Outcome: Progressing     Problem: DISCHARGE PLANNING  Goal: Discharge to home or other facility with appropriate resources  Description  INTERVENTIONS:  - Identify barriers to discharge w/patient and caregiver  - Arrange for needed discharge resources and transportation as appropriate  - Identify discharge learning needs (meds, wound care, etc )  - Arrange for interpretive services to assist at discharge as needed  - Refer to Case Management Department for coordinating discharge planning if the patient needs post-hospital services based on physician/advanced practitioner order or complex needs related to functional status, cognitive ability, or social support system  Outcome: Progressing     Problem: Knowledge Deficit  Goal: Patient/family/caregiver demonstrates understanding of disease process, treatment plan, medications, and discharge instructions  Description  Complete learning assessment and assess knowledge base    Interventions:  - Provide teaching at level of understanding  - Provide teaching via preferred learning methods  Outcome: Progressing

## 2020-01-10 VITALS
BODY MASS INDEX: 25.1 KG/M2 | WEIGHT: 147 LBS | SYSTOLIC BLOOD PRESSURE: 93 MMHG | OXYGEN SATURATION: 94 % | HEART RATE: 63 BPM | RESPIRATION RATE: 18 BRPM | HEIGHT: 64 IN | DIASTOLIC BLOOD PRESSURE: 59 MMHG | TEMPERATURE: 97.7 F

## 2020-01-10 PROBLEM — E88.1 LIPODYSTROPHY: Status: ACTIVE | Noted: 2020-01-10

## 2020-01-10 RX ORDER — TRAMADOL HYDROCHLORIDE 50 MG/1
50 TABLET ORAL EVERY 6 HOURS PRN
Qty: 30 TABLET | Refills: 0 | Status: SHIPPED | OUTPATIENT
Start: 2020-01-10 | End: 2020-01-20

## 2020-01-10 RX ORDER — CEPHALEXIN 500 MG/1
500 CAPSULE ORAL EVERY 8 HOURS SCHEDULED
Qty: 21 CAPSULE | Refills: 0 | Status: SHIPPED | OUTPATIENT
Start: 2020-01-10 | End: 2020-01-17

## 2020-01-10 RX ADMIN — TRAMADOL HYDROCHLORIDE 50 MG: 50 TABLET, FILM COATED ORAL at 11:56

## 2020-01-10 RX ADMIN — TRAMADOL HYDROCHLORIDE 50 MG: 50 TABLET, FILM COATED ORAL at 07:33

## 2020-01-10 RX ADMIN — CEFAZOLIN SODIUM 1000 MG: 1 SOLUTION INTRAVENOUS at 05:17

## 2020-01-10 RX ADMIN — HYDROMORPHONE HYDROCHLORIDE 1 MG: 1 INJECTION, SOLUTION INTRAMUSCULAR; INTRAVENOUS; SUBCUTANEOUS at 09:44

## 2020-01-10 RX ADMIN — DEXTROSE AND SODIUM CHLORIDE 125 ML/HR: 5; .45 INJECTION, SOLUTION INTRAVENOUS at 05:21

## 2020-01-10 RX ADMIN — HYDROMORPHONE HYDROCHLORIDE 1 MG: 1 INJECTION, SOLUTION INTRAMUSCULAR; INTRAVENOUS; SUBCUTANEOUS at 06:20

## 2020-01-10 RX ADMIN — HYDROMORPHONE HYDROCHLORIDE 1 MG: 1 INJECTION, SOLUTION INTRAMUSCULAR; INTRAVENOUS; SUBCUTANEOUS at 17:43

## 2020-01-10 RX ADMIN — DEXTROSE AND SODIUM CHLORIDE 125 ML/HR: 5; .45 INJECTION, SOLUTION INTRAVENOUS at 12:47

## 2020-01-10 RX ADMIN — HYDROMORPHONE HYDROCHLORIDE 1 MG: 1 INJECTION, SOLUTION INTRAMUSCULAR; INTRAVENOUS; SUBCUTANEOUS at 13:37

## 2020-01-10 RX ADMIN — DIPHENHYDRAMINE HYDROCHLORIDE 25 MG: 50 INJECTION INTRAMUSCULAR; INTRAVENOUS at 09:43

## 2020-01-10 RX ADMIN — HYDROMORPHONE HYDROCHLORIDE 1 MG: 1 INJECTION, SOLUTION INTRAMUSCULAR; INTRAVENOUS; SUBCUTANEOUS at 00:11

## 2020-01-10 RX ADMIN — TRAMADOL HYDROCHLORIDE 50 MG: 50 TABLET, FILM COATED ORAL at 16:47

## 2020-01-10 NOTE — NURSING NOTE
AOX3 HR regular Lungs clear hypoactive Bowel sounds x4 + PP ABD soft  C/O pain ultram was given and was not effective then Dilaudid was given and was effective  C/O itching benadryl was given and was effective  Will continue to monitor call bell within reach

## 2020-01-10 NOTE — SOCIAL WORK
Obs notice was explained to pt who gave verbal understanding  Signed copy being sent to medical records to be scanned into Epic chart

## 2020-01-10 NOTE — PLAN OF CARE
Problem: Prexisting or High Potential for Compromised Skin Integrity  Goal: Skin integrity is maintained or improved  Description  INTERVENTIONS:  - Identify patients at risk for skin breakdown  - Assess and monitor skin integrity  - Assess and monitor nutrition and hydration status  - Monitor labs   - Assess for incontinence   - Turn and reposition patient  - Assist with mobility/ambulation  - Relieve pressure over bony prominences  - Avoid friction and shearing  - Provide appropriate hygiene as needed including keeping skin clean and dry  - Evaluate need for skin moisturizer/barrier cream  - Collaborate with interdisciplinary team   - Patient/family teaching  - Consider wound care consult   Outcome: Progressing     Problem: Potential for Falls  Goal: Patient will remain free of falls  Description  INTERVENTIONS:  - Assess patient frequently for physical needs  -  Identify cognitive and physical deficits and behaviors that affect risk of falls    -  [place] fall precautions as indicated by assessment   - Educate patient/family on patient safety including physical limitations  - Instruct patient to call for assistance with activity based on assessment  - Modify environment to reduce risk of injury  - Consider OT/PT consult to assist with strengthening/mobility  Outcome: Progressing     Problem: PAIN - ADULT  Goal: Verbalizes/displays adequate comfort level or baseline comfort level  Description  Interventions:  - Encourage patient to monitor pain and request assistance  - Assess pain using appropriate pain scale  - Administer analgesics based on type and severity of pain and evaluate response  - Implement non-pharmacological measures as appropriate and evaluate response  - Consider cultural and social influences on pain and pain management  - Notify physician/advanced practitioner if interventions unsuccessful or patient reports new pain  Outcome: Progressing     Problem: INFECTION - ADULT  Goal: Absence or prevention of progression during hospitalization  Description  INTERVENTIONS:  - Assess and monitor for signs and symptoms of infection  - Monitor lab/diagnostic results  - Monitor all insertion sites, i e  indwelling lines, tubes, and drains  - Monitor endotracheal if appropriate and nasal secretions for changes in amount and color  - [place] appropriate cooling/warming therapies per order  - Administer medications as ordered  - Instruct and encourage patient and family to use good hand hygiene technique  - Identify and instruct in appropriate isolation precautions for identified infection/condition  Outcome: Progressing  Goal: Absence of fever/infection during neutropenic period  Description  INTERVENTIONS:  - Monitor WBC    Outcome: Progressing     Problem: SAFETY ADULT  Goal: Patient will remain free of falls  Description  INTERVENTIONS:  - Assess patient frequently for physical needs  -  Identify cognitive and physical deficits and behaviors that affect risk of falls    -  [place] fall precautions as indicated by assessment   - Educate patient/family on patient safety including physical limitations  - Instruct patient to call for assistance with activity based on assessment  - Modify environment to reduce risk of injury  - Consider OT/PT consult to assist with strengthening/mobility  Outcome: Progressing  Goal: Maintain or return to baseline ADL function  Description  INTERVENTIONS:  -  Assess patient's ability to carry out ADLs; assess patient's baseline for ADL function and identify physical deficits which impact ability to perform ADLs (bathing, care of mouth/teeth, toileting, grooming, dressing, etc )  - Assess/evaluate cause of self-care deficits   - Assess range of motion  - Assess patient's mobility; develop plan if impaired  - Assess patient's need for assistive devices and provide as appropriate  - Encourage maximum independence but intervene and supervise when necessary  - Involve family in performance of ADLs  - Assess for home care needs following discharge   - Consider OT consult to assist with ADL evaluation and planning for discharge  - Provide patient education as appropriate  Outcome: Progressing  Goal: Maintain or return mobility status to optimal level  Description  INTERVENTIONS:  - Assess patient's baseline mobility status (ambulation, transfers, stairs, etc )    - Identify cognitive and physical deficits and behaviors that affect mobility  - Identify mobility aids required to assist with transfers and/or ambulation (gait belt, sit-to-stand, lift, walker, cane, etc )  - [place] fall precautions as indicated by assessment  - Record patient progress and toleration of activity level on Mobility SBAR; progress patient to next Phase/Stage  - Instruct patient to call for assistance with activity based on assessment  - Consider rehabilitation consult to assist with strengthening/weightbearing, etc   Outcome: Progressing     Problem: DISCHARGE PLANNING  Goal: Discharge to home or other facility with appropriate resources  Description  INTERVENTIONS:  - Identify barriers to discharge w/patient and caregiver  - Arrange for needed discharge resources and transportation as appropriate  - Identify discharge learning needs (meds, wound care, etc )  - Arrange for interpretive services to assist at discharge as needed  - Refer to Case Management Department for coordinating discharge planning if the patient needs post-hospital services based on physician/advanced practitioner order or complex needs related to functional status, cognitive ability, or social support system  Outcome: Progressing     Problem: Knowledge Deficit  Goal: Patient/family/caregiver demonstrates understanding of disease process, treatment plan, medications, and discharge instructions  Description  Complete learning assessment and assess knowledge base    Interventions:  - Provide teaching at level of understanding  - Provide teaching via preferred learning methods  Outcome: Progressing

## 2020-01-10 NOTE — NURSING NOTE
PT was D/C to home D/C instruction was given to PT and Pt verbalized understanding of D/C instruction  All personal belonging was given to PT  IV was D/C   5 T staff accompany PT to lobby

## 2020-01-10 NOTE — NURSING NOTE
Velasquez removed encouraged pt to ambulate, pt states "can I get my pain medication first" pain rated 10/10, dilaudid given   Will monitor

## 2020-01-10 NOTE — ANESTHESIA POST-OP FOLLOW-UP NOTE
Patient: Kimberlylene Severs  Procedure(s) with comments:  ABDOMINALPLASTY,CIRCUMFERENTIAL - APPROX 3KG EXCISED TISSUE DISCARDED  Vitals:    01/10/20 0726   BP: 95/57   Pulse: 64   Resp: 20   Temp: 98 1 °F (36 7 °C)   SpO2: 97%           Pt doing fine  VSS  No PONV  Pain well controlled  Hydration good  S1S2 Normal  Chest clear  No immediate anesthesia complications noted

## 2020-01-10 NOTE — PLAN OF CARE
Problem: Prexisting or High Potential for Compromised Skin Integrity  Goal: Skin integrity is maintained or improved  Description  INTERVENTIONS:  - Identify patients at risk for skin breakdown  - Assess and monitor skin integrity  - Assess and monitor nutrition and hydration status  - Monitor labs   - Assess for incontinence   - Turn and reposition patient  - Assist with mobility/ambulation  - Relieve pressure over bony prominences  - Avoid friction and shearing  - Provide appropriate hygiene as needed including keeping skin clean and dry  - Evaluate need for skin moisturizer/barrier cream  - Collaborate with interdisciplinary team   - Patient/family teaching  - Consider wound care consult   Outcome: Progressing     Problem: Potential for Falls  Goal: Patient will remain free of falls  Description  INTERVENTIONS:  - Assess patient frequently for physical needs  -  Identify cognitive and physical deficits and behaviors that affect risk of falls    -  [place] fall precautions as indicated by assessment   - Educate patient/family on patient safety including physical limitations  - Instruct patient to call for assistance with activity based on assessment  - Modify environment to reduce risk of injury  - Consider OT/PT consult to assist with strengthening/mobility  Outcome: Progressing     Problem: PAIN - ADULT  Goal: Verbalizes/displays adequate comfort level or baseline comfort level  Description  Interventions:  - Encourage patient to monitor pain and request assistance  - Assess pain using appropriate pain scale  - Administer analgesics based on type and severity of pain and evaluate response  - Implement non-pharmacological measures as appropriate and evaluate response  - Consider cultural and social influences on pain and pain management  - Notify physician/advanced practitioner if interventions unsuccessful or patient reports new pain  Outcome: Progressing     Problem: INFECTION - ADULT  Goal: Absence or prevention of progression during hospitalization  Description  INTERVENTIONS:  - Assess and monitor for signs and symptoms of infection  - Monitor lab/diagnostic results  - Monitor all insertion sites, i e  indwelling lines, tubes, and drains  - Monitor endotracheal if appropriate and nasal secretions for changes in amount and color  - [place] appropriate cooling/warming therapies per order  - Administer medications as ordered  - Instruct and encourage patient and family to use good hand hygiene technique  - Identify and instruct in appropriate isolation precautions for identified infection/condition  Outcome: Progressing  Goal: Absence of fever/infection during neutropenic period  Description  INTERVENTIONS:  - Monitor WBC    Outcome: Progressing     Problem: SAFETY ADULT  Goal: Patient will remain free of falls  Description  INTERVENTIONS:  - Assess patient frequently for physical needs  -  Identify cognitive and physical deficits and behaviors that affect risk of falls    -  [place] fall precautions as indicated by assessment   - Educate patient/family on patient safety including physical limitations  - Instruct patient to call for assistance with activity based on assessment  - Modify environment to reduce risk of injury  - Consider OT/PT consult to assist with strengthening/mobility  Outcome: Progressing  Goal: Maintain or return to baseline ADL function  Description  INTERVENTIONS:  -  Assess patient's ability to carry out ADLs; assess patient's baseline for ADL function and identify physical deficits which impact ability to perform ADLs (bathing, care of mouth/teeth, toileting, grooming, dressing, etc )  - Assess/evaluate cause of self-care deficits   - Assess range of motion  - Assess patient's mobility; develop plan if impaired  - Assess patient's need for assistive devices and provide as appropriate  - Encourage maximum independence but intervene and supervise when necessary  - Involve family in performance of ADLs  - Assess for home care needs following discharge   - Consider OT consult to assist with ADL evaluation and planning for discharge  - Provide patient education as appropriate  Outcome: Progressing  Goal: Maintain or return mobility status to optimal level  Description  INTERVENTIONS:  - Assess patient's baseline mobility status (ambulation, transfers, stairs, etc )    - Identify cognitive and physical deficits and behaviors that affect mobility  - Identify mobility aids required to assist with transfers and/or ambulation (gait belt, sit-to-stand, lift, walker, cane, etc )  - [place] fall precautions as indicated by assessment  - Record patient progress and toleration of activity level on Mobility SBAR; progress patient to next Phase/Stage  - Instruct patient to call for assistance with activity based on assessment  - Consider rehabilitation consult to assist with strengthening/weightbearing, etc   Outcome: Progressing     Problem: DISCHARGE PLANNING  Goal: Discharge to home or other facility with appropriate resources  Description  INTERVENTIONS:  - Identify barriers to discharge w/patient and caregiver  - Arrange for needed discharge resources and transportation as appropriate  - Identify discharge learning needs (meds, wound care, etc )  - Arrange for interpretive services to assist at discharge as needed  - Refer to Case Management Department for coordinating discharge planning if the patient needs post-hospital services based on physician/advanced practitioner order or complex needs related to functional status, cognitive ability, or social support system  Outcome: Progressing     Problem: Knowledge Deficit  Goal: Patient/family/caregiver demonstrates understanding of disease process, treatment plan, medications, and discharge instructions  Description  Complete learning assessment and assess knowledge base    Interventions:  - Provide teaching at level of understanding  - Provide teaching via preferred learning methods  Outcome: Progressing

## 2020-01-10 NOTE — SOCIAL WORK
CM met with pt to introduce CM role and begin discharge planning  Pt lives with her 5 children ranging in age from 13to 3years old in a 2 story house that has 8 DILAN  Pt's emergency contact is her friend, Saira Meyer ([phone]), pt has no designated POA  Pt reports being independent with ADLs PTA, no use of DMEs  Pt reports no history of VNA, STR, inpatient MH treatment or D/A treatment  Pt drives and works  PCP is Dr Nate Mehta ([phone]) and pt uses [facility] pharmacy in [facility] for prescriptions  Pt's friends Genaro Petit and Genevieve Shell plan to transport pt at time of discharge  CM reviewed d/c planning process including the following: identifying help at home, patient preference for d/c planning needs, Discharge Lounge, [facility] Meds to Bed program, availability of treatment team to discuss questions or concerns patient and/or family may have regarding understanding medications and recognizing signs and symptoms once discharged  CM also encouraged patient to follow up with all recommended appointments after discharge  Patient advised of importance for patient and family to participate in managing patients medical well being

## 2020-01-10 NOTE — UTILIZATION REVIEW
Initial Clinical Review    Elective  OP surgical procedure  Age/Sex: 37 y o  female     Surgery Date: 1/9/2020  Procedure: ABDOMINALPLASTY,CIRCUMFERENTIAL (N/A)  Anesthesia: General  Operative Findings: Normal    POD#1 Progress Note:   Pending  Requiring Dilaudid IV for pain     Admission Orders: Date/Time/Statement:   Orders Placed This Encounter   Procedures    Place in Observation     Standing Status:   Standing     Number of Occurrences:   1     Order Specific Question:   Admitting Physician     Answer:   Leopoldo Ireland [[id number]]     Order Specific Question:   Level of Care     Answer:   Med Surg [[id number]]     Vital Signs: BP 95/57 (BP Location: Right arm)   Pulse 64   Temp 98 1 °F (36 7 °C) (Temporal)   Resp 20   Ht 5' 4" (1 626 m)   Wt 66 7 kg (147 lb)   LMP 12/17/2019   SpO2 97%   BMI 25 23 kg/m²   Diet: REGULAR  Mobility: Ambulate  DVT Prophylaxis: SCDs    Medications/Pain Control:   Scheduled Medications:  Medications  scopolamine 1 patch Transdermal Q72H     Continuous IV Infusions:  dextrose 5 % and sodium chloride 0 45 % 125 mL/hr Intravenous Continuous     PRN Meds:  diphenhydrAMINE 25 mg Intravenous Q6H PRN x 1 1/10   HYDROmorphone 1 mg Intravenous Q2H PRN x 1 1/9 & x  4 1/10   ondansetron 4 mg Intravenous Q6H PRN   traMADol 50 mg Oral Q6H PRN x 2 1/10         Network Utilization Review Department  [email or web address]  ATTENTION: Please call with any questions or concerns to [phone] and carefully listen to the prompts so that you are directed to the right person  All voicemails are confidential   Rosalie Downey all requests for admission clinical reviews, approved or denied determinations and any other requests to dedicated fax number below belonging to the campus where the patient is receiving treatment   List of dedicated fax numbers for the Facilities:  [address] [place] [address] DENIALS (Administrative/Medical Necessity) [phone]   [address] [place] [address] (Maternity/NICU/Pediatrics) [phone]     Deondre Aime [phone]   Bre Gonzalez [phone]   Nick Lema [phone]   [facility] [address] [facility] [phone]   [facility]  [phone]   [address] [facility], S   [address] [facility] [address] [phone]

## 2021-01-21 ENCOUNTER — TELEPHONE (OUTPATIENT)
Dept: GASTROENTEROLOGY | Facility: CLINIC | Age: 36
End: 2021-01-21

## 2021-01-21 NOTE — TELEPHONE ENCOUNTER
Received order from [facility] for us to call pt to schedule an appt for abd pain  I lmom for pt to please call back to schedule an appt  Will call pt again in one week if do not hear back from her

## 2021-01-28 NOTE — TELEPHONE ENCOUNTER
I lmom for pt to please call back to schedule an appt  Will call pt again in two weeks if do not hear back from her

## 2021-02-03 ENCOUNTER — TELEPHONE (OUTPATIENT)
Dept: GASTROENTEROLOGY | Facility: CLINIC | Age: 36
End: 2021-02-03

## 2021-02-03 ENCOUNTER — OFFICE VISIT (OUTPATIENT)
Dept: GASTROENTEROLOGY | Facility: CLINIC | Age: 36
End: 2021-02-03

## 2021-02-03 VITALS
BODY MASS INDEX: 22.3 KG/M2 | DIASTOLIC BLOOD PRESSURE: 86 MMHG | HEART RATE: 90 BPM | SYSTOLIC BLOOD PRESSURE: 110 MMHG | WEIGHT: 130.6 LBS | HEIGHT: 64 IN

## 2021-02-03 DIAGNOSIS — K59.00 CONSTIPATION, UNSPECIFIED CONSTIPATION TYPE: ICD-10-CM

## 2021-02-03 DIAGNOSIS — R11.2 NAUSEA AND VOMITING, INTRACTABILITY OF VOMITING NOT SPECIFIED, UNSPECIFIED VOMITING TYPE: ICD-10-CM

## 2021-02-03 DIAGNOSIS — R10.33 PERIUMBILICAL PAIN: Primary | ICD-10-CM

## 2021-02-03 PROCEDURE — 3008F BODY MASS INDEX DOCD: CPT | Performed by: PHYSICIAN ASSISTANT

## 2021-02-03 PROCEDURE — 99203 OFFICE O/P NEW LOW 30 MIN: CPT | Performed by: PHYSICIAN ASSISTANT

## 2021-02-03 PROCEDURE — 1036F TOBACCO NON-USER: CPT | Performed by: PHYSICIAN ASSISTANT

## 2021-02-03 RX ORDER — PANTOPRAZOLE SODIUM 40 MG/1
40 TABLET, DELAYED RELEASE ORAL 2 TIMES DAILY
Qty: 60 TABLET | Refills: 3 | Status: SHIPPED | OUTPATIENT
Start: 2021-02-03 | End: 2021-10-07

## 2021-02-03 RX ORDER — LINACLOTIDE 72 UG/1
1 CAPSULE, GELATIN COATED ORAL
Qty: 30 CAPSULE | Refills: 5 | Status: SHIPPED | OUTPATIENT
Start: 2021-02-03 | End: 2022-01-31

## 2021-02-03 RX ORDER — DICYCLOMINE HCL 20 MG
20 TABLET ORAL EVERY 6 HOURS
Qty: 60 TABLET | Refills: 3 | Status: SHIPPED | OUTPATIENT
Start: 2021-02-03 | End: 2021-02-10

## 2021-02-03 RX ORDER — SUCRALFATE ORAL 1 G/10ML
1 SUSPENSION ORAL
Qty: 1200 ML | Refills: 3 | Status: SHIPPED | OUTPATIENT
Start: 2021-02-03 | End: 2022-01-06

## 2021-02-03 NOTE — PATIENT INSTRUCTIONS
Abdominal Pain   WHAT YOU NEED TO KNOW:   Abdominal pain can be dull, achy, or sharp  You may have pain in one area of your abdomen, or in your entire abdomen  Your pain may be caused by a condition such as constipation, food sensitivity or poisoning, infection, or a blockage  Abdominal pain can also be from a hernia, appendicitis, or an ulcer  Liver, gallbladder, or kidney conditions can also cause abdominal pain  The cause of your abdominal pain may be unknown  DISCHARGE INSTRUCTIONS:   Return to the emergency department if:   · You have new chest pain or shortness of breath  · You have pulsing pain in your upper abdomen or lower back that suddenly becomes constant  · Your pain is in the right lower abdominal area and worsens with movement  · You have a fever over 100 4°F (38°C) or shaking chills  · You are vomiting and cannot keep food or liquids down  · Your pain does not improve or gets worse over the next 8 to 12 hours  · You see blood in your vomit or bowel movements, or they look black and tarry  · Your skin or the whites of your eyes turn yellow  · You are a woman and have a large amount of vaginal bleeding that is not your monthly period  Contact your healthcare provider if:   · You have pain in your lower back  · You are a man and have pain in your testicles  · You have pain when you urinate  · You have questions or concerns about your condition or care  Follow up with your healthcare provider within 24 hours or as directed:  Write down your questions so you remember to ask them during your visits  Medicines:   · Medicines  may be given to calm your stomach and prevent vomiting or to decrease pain  Ask how to take pain medicine safely  · Take your medicine as directed  Contact your healthcare provider if you think your medicine is not helping or if you have side effects  Tell him of her if you are allergic to any medicine   Keep a list of the medicines, vitamins, and herbs you take  Include the amounts, and when and why you take them  Bring the list or the pill bottles to follow-up visits  Carry your medicine list with you in case of an emergency  © Copyright [address] Information is for End User's use only and may not be sold, redistributed or otherwise used for commercial purposes  All illustrations and images included in CareNotes® are the copyrighted property of A D A M , Inc  or [facility] Kapil Villanueva   The above information is an  only  It is not intended as medical advice for individual conditions or treatments  Talk to your doctor, nurse or pharmacist before following any medical regimen to see if it is safe and effective for you

## 2021-02-03 NOTE — PROGRESS NOTES
[facility] - Outpatient Consultation  Oz Ceja 38 y o  female MRN: [id number]  Encounter: [id number]          ASSESSMENT AND PLAN:      1  Periumbilical pain  2  Nausea and vomiting, intractability of vomiting not specified, unspecified vomiting type  3  Constipation, unspecified constipation type    Will plan EGD and colonoscopy to investigate further  Will start patient on pantoprazole 40 mg twice a day as well as Carafate 1 g 4 times a day  Will start Linzess 72 micro g for patient's constipation  Follow-up after testing is complete   ______________________________________________________________________    HPI:   24-year-old female who is referred for abdominal pain, abnormal CT scan, constipation  Patient reports that she has been suffering with periumbilical abdominal pain and burning for quite some time  She reports that 2-3 weeks ago she did go to the emergency room for evaluation  Patient did have evidence of wall thickening of the cecum as well as an abnormality adjacent to the cecum  Patient was referred for colonoscopy for further evaluation  Patient reports chronic constipation  She reports she will have a bowel movement once every 10 days  Patient does have a history of gastric sleeve that was then revised to a gastric bypass secondary to severe acid reflux disease  Prior to that in 2018 she did undergo an upper endoscopy and 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  Patient is not maintained on any PPI therapy at this time  She reports she has tried over-the-counter Nexium, Tums, Pepcid, Zantac  She also reports she has been vomiting daily  Patient reports that for constipation problem she has failed over-the-counters including MiraLax  Patient denies any melena or rectal bleeding  Patient denies any hematemesis  REVIEW OF SYSTEMS:    CONSTITUTIONAL: Denies any fever, chills, rigors, and weight loss  HEENT: No earache or tinnitus  Denies hearing loss or visual disturbances  CARDIOVASCULAR: No chest pain or palpitations  RESPIRATORY: Denies any cough, hemoptysis, shortness of breath or dyspnea on exertion  GASTROINTESTINAL: As noted in the History of Present Illness  GENITOURINARY: No problems with urination  Denies any hematuria or dysuria  NEUROLOGIC: No dizziness or vertigo, denies headaches  MUSCULOSKELETAL: Denies any muscle or joint pain  SKIN: Denies skin rashes or itching  ENDOCRINE: Denies excessive thirst  Denies intolerance to heat or cold  PSYCHOSOCIAL: Denies depression or anxiety  Denies any recent memory loss         Historical Information   Past Medical History:   Diagnosis Date    Anemia     iron def    Chronic pain disorder     feet    Depression     Diabetes mellitus (HCC)     neg since gastric bypass    Gilbert's syndrome     Neuropathy     feet sheila    Vitamin D deficiency      Past Surgical History:   Procedure Laterality Date     SECTION      X3    GASTRIC BYPASS      PA EXCISE EXCESS SKIN TISSUE,ABDOMEN N/A 2020    Procedure: ABDOMINALPLASTY,CIRCUMFERENTIAL;  Surgeon: Sascha Grajeda MD;  Location: [address] [place] [address];  Service: Plastics     Social History   Social History     Substance and Sexual Activity   Alcohol Use Never    Frequency: Never    Binge frequency: Never     Social History     Substance and Sexual Activity   Drug Use Never     Social History     Tobacco Use   Smoking Status Former Smoker   Smokeless Tobacco Never Used     Family History   Problem Relation Age of Onset    Mental illness Mother     Drug abuse Mother        Meds/Allergies       Current Outpatient Medications:     ferrous sulfate 325 (65 Fe) mg tablet    Multiple Vitamin (MULTI-VITAMIN PO)    pregabalin (LYRICA) 150 mg capsule    [employer]S VITAMIN C 250 MG tablet    dicyclomine (BENTYL) 20 mg tablet    ergocalciferol (VITAMIN D2) 50,000 units    linaCLOtide (Linzess) 72 MCG CAPS    pantoprazole (PROTONIX) 40 mg tablet    sucralfate (CARAFATE) 1 g/10 mL suspension    No Known Allergies        Objective     Blood pressure 110/86, pulse 90, height 5' 4" (1 626 m), weight 59 2 kg (130 lb 9 6 oz)  Body mass index is 22 42 kg/m²  PHYSICAL EXAM:      General Appearance:   Alert, cooperative, no distress   HEENT:   Normocephalic, atraumatic, anicteric      Neck:  Supple, symmetrical, trachea midline   Lungs:   Clear to auscultation bilaterally; no rales, rhonchi or wheezing; respirations unlabored    Heart[de-identified]   Regular rate and rhythm; no murmur, rub, or gallop  Abdomen:   Soft, non-tender, non-distended; normal bowel sounds; no masses, no organomegaly    Genitalia:   Deferred    Rectal:   Deferred    Extremities:  No cyanosis, clubbing or edema    Pulses:  2+ and symmetric    Skin:  No jaundice, rashes, or lesions    Lymph nodes:  No palpable cervical lymphadenopathy        Lab Results:   No visits with results within 1 Day(s) from this visit  Latest known visit with results is:   Admission on 01/09/2020, Discharged on 01/10/2020   Component Date Value    EXT Preg Test, Ur 01/09/2020 Negative     Control 01/09/2020 Valid          Radiology Results:   No results found

## 2021-02-04 ENCOUNTER — PREP FOR PROCEDURE (OUTPATIENT)
Dept: GASTROENTEROLOGY | Facility: CLINIC | Age: 36
End: 2021-02-04

## 2021-02-04 DIAGNOSIS — R11.2 NAUSEA AND VOMITING, INTRACTABILITY OF VOMITING NOT SPECIFIED, UNSPECIFIED VOMITING TYPE: Primary | ICD-10-CM

## 2021-02-04 DIAGNOSIS — K59.00 CONSTIPATION, UNSPECIFIED CONSTIPATION TYPE: ICD-10-CM

## 2021-02-10 DIAGNOSIS — R10.33 PERIUMBILICAL PAIN: ICD-10-CM

## 2021-02-10 DIAGNOSIS — R11.2 NAUSEA AND VOMITING, INTRACTABILITY OF VOMITING NOT SPECIFIED, UNSPECIFIED VOMITING TYPE: ICD-10-CM

## 2021-02-10 RX ORDER — DICYCLOMINE HCL 20 MG
TABLET ORAL
Qty: 60 TABLET | Refills: 3 | Status: SHIPPED | OUTPATIENT
Start: 2021-02-10 | End: 2022-03-17

## 2021-02-23 ENCOUNTER — ANESTHESIA (OUTPATIENT)
Dept: GASTROENTEROLOGY | Facility: HOSPITAL | Age: 36
End: 2021-02-23

## 2021-02-23 ENCOUNTER — HOSPITAL ENCOUNTER (OUTPATIENT)
Dept: GASTROENTEROLOGY | Facility: HOSPITAL | Age: 36
Setting detail: OUTPATIENT SURGERY
Discharge: HOME | End: 2021-02-23
Attending: INTERNAL MEDICINE
Payer: COMMERCIAL

## 2021-02-23 ENCOUNTER — ANESTHESIA EVENT (OUTPATIENT)
Dept: GASTROENTEROLOGY | Facility: HOSPITAL | Age: 36
End: 2021-02-23

## 2021-02-23 VITALS
HEART RATE: 65 BPM | DIASTOLIC BLOOD PRESSURE: 55 MMHG | WEIGHT: 130.29 LBS | OXYGEN SATURATION: 100 % | HEIGHT: 64 IN | BODY MASS INDEX: 22.24 KG/M2 | TEMPERATURE: 97.8 F | SYSTOLIC BLOOD PRESSURE: 103 MMHG | RESPIRATION RATE: 16 BRPM

## 2021-02-23 VITALS — HEART RATE: 63 BPM

## 2021-02-23 DIAGNOSIS — R11.2 NAUSEA AND VOMITING, INTRACTABILITY OF VOMITING NOT SPECIFIED, UNSPECIFIED VOMITING TYPE: ICD-10-CM

## 2021-02-23 DIAGNOSIS — K59.00 CONSTIPATION, UNSPECIFIED CONSTIPATION TYPE: ICD-10-CM

## 2021-02-23 LAB
EXT PREGNANCY TEST URINE: NEGATIVE
EXT. CONTROL: NORMAL

## 2021-02-23 PROCEDURE — 88305 TISSUE EXAM BY PATHOLOGIST: CPT | Performed by: PATHOLOGY

## 2021-02-23 PROCEDURE — 43239 EGD BIOPSY SINGLE/MULTIPLE: CPT | Performed by: INTERNAL MEDICINE

## 2021-02-23 PROCEDURE — 81025 URINE PREGNANCY TEST: CPT | Performed by: ANESTHESIOLOGY

## 2021-02-23 PROCEDURE — 45378 DIAGNOSTIC COLONOSCOPY: CPT | Performed by: INTERNAL MEDICINE

## 2021-02-23 RX ORDER — PROPOFOL 10 MG/ML
INJECTION, EMULSION INTRAVENOUS AS NEEDED
Status: DISCONTINUED | OUTPATIENT
Start: 2021-02-23 | End: 2021-02-23

## 2021-02-23 RX ORDER — LIDOCAINE HYDROCHLORIDE 10 MG/ML
INJECTION, SOLUTION EPIDURAL; INFILTRATION; INTRACAUDAL; PERINEURAL AS NEEDED
Status: DISCONTINUED | OUTPATIENT
Start: 2021-02-23 | End: 2021-02-23

## 2021-02-23 RX ORDER — SODIUM CHLORIDE, SODIUM LACTATE, POTASSIUM CHLORIDE, CALCIUM CHLORIDE 600; 310; 30; 20 MG/100ML; MG/100ML; MG/100ML; MG/100ML
125 INJECTION, SOLUTION INTRAVENOUS CONTINUOUS
Status: DISCONTINUED | OUTPATIENT
Start: 2021-02-23 | End: 2021-02-27 | Stop reason: HOSPADM

## 2021-02-23 RX ADMIN — PROPOFOL 20 MG: 10 INJECTION, EMULSION INTRAVENOUS at 08:12

## 2021-02-23 RX ADMIN — PROPOFOL 30 MG: 10 INJECTION, EMULSION INTRAVENOUS at 07:56

## 2021-02-23 RX ADMIN — PROPOFOL 150 MG: 10 INJECTION, EMULSION INTRAVENOUS at 07:53

## 2021-02-23 RX ADMIN — SODIUM CHLORIDE, SODIUM LACTATE, POTASSIUM CHLORIDE, AND CALCIUM CHLORIDE 125 ML/HR: .6; .31; .03; .02 INJECTION, SOLUTION INTRAVENOUS at 07:31

## 2021-02-23 RX ADMIN — LIDOCAINE HYDROCHLORIDE 50 MG: 10 INJECTION, SOLUTION EPIDURAL; INFILTRATION; INTRACAUDAL; PERINEURAL at 07:52

## 2021-02-23 RX ADMIN — PROPOFOL 30 MG: 10 INJECTION, EMULSION INTRAVENOUS at 08:07

## 2021-02-23 NOTE — ANESTHESIA PREPROCEDURE EVALUATION
Procedure:  COLONOSCOPY  EGD    Relevant Problems   ENDO   (+) Controlled type 2 diabetes mellitus without complication, without long-term current use of insulin (HCC)      NEURO/PSYCH   (+) Diabetic polyneuropathy (HCC)        Physical Exam    Airway    Mallampati score: II  TM Distance: >3 FB  Neck ROM: full     Dental   upper dentures,     Cardiovascular  Rhythm: regular, Rate: normal, Cardiovascular exam normal    Pulmonary  Pulmonary exam normal Breath sounds clear to auscultation,     Other Findings        Anesthesia Plan  ASA Score- 2     Anesthesia Type- IV sedation with anesthesia with ASA Monitors  Additional Monitors:   Airway Plan:           Plan Factors-Exercise tolerance (METS): >4 METS  Chart reviewed  Existing labs reviewed  Patient is not a current smoker  Patient not instructed to abstain from smoking on day of procedure  Patient did not smoke on day of surgery  Induction- intravenous  Postoperative Plan-     Informed Consent- Anesthetic plan and risks discussed with patient  I personally reviewed this patient with the CRNA  Discussed and agreed on the Anesthesia Plan with the CRNA  Lucille Vinson

## 2021-02-23 NOTE — DISCHARGE INSTRUCTIONS

## 2021-02-23 NOTE — H&P
History and Physical - [facility]  Tristan Purcell 38 y o  female MRN: [id number]      HPI: Tristan Purcell is a 38y o  year old female who presents for evaluation of abdominal pain, nausea and vomiting, constipation      REVIEW OF SYSTEMS: Per the HPI, and otherwise unremarkable  Historical Information   Past Medical History:   Diagnosis Date    Anemia     iron def    Chronic pain disorder     feet    Depression     Diabetes mellitus (HCC)     neg since gastric bypass    Gilbert's syndrome     Neuropathy     feet sheila    Vitamin D deficiency      Past Surgical History:   Procedure Laterality Date     SECTION      X3    GASTRIC BYPASS      NE EXCISE EXCESS SKIN TISSUE,ABDOMEN N/A 2020    Procedure: ABDOMINALPLASTY,CIRCUMFERENTIAL;  Surgeon: Anup Abdul MD;  Location: [address] [place] [address];  Service: Plastics     Social History   Social History     Substance and Sexual Activity   Alcohol Use Never    Frequency: Never    Binge frequency: Never     Social History     Substance and Sexual Activity   Drug Use Never     Social History     Tobacco Use   Smoking Status Former Smoker   Smokeless Tobacco Never Used     Family History   Problem Relation Age of Onset    Mental illness Mother     Drug abuse Mother        Meds/Allergies     (Not in a hospital admission)      No Known Allergies    Objective     There were no vitals taken for this visit  PHYSICAL EXAM    Gen: NAD  CV: RRR  CHEST: Clear  ABD: soft, NT/ND  EXT: no edema      ASSESSMENT/PLAN:  This is a 38y o  year old female here for esophagogastroduodenoscopy, colonoscopy, and she is stable and optimized for her procedure

## 2021-02-23 NOTE — ANESTHESIA POSTPROCEDURE EVALUATION
Post-Op Assessment Note    CV Status:  Stable    Pain management: adequate     Mental Status:  Awake   Hydration Status:  Euvolemic   PONV Controlled:  Controlled   Airway Patency:  Patent      Post Op Vitals Reviewed: Yes      Staff: CRNA         No complications documented      BP   1-5/60   Temp     Pulse  60   Resp   20   SpO2   100

## 2021-02-25 ENCOUNTER — TELEPHONE (OUTPATIENT)
Dept: GASTROENTEROLOGY | Facility: CLINIC | Age: 36
End: 2021-02-25

## 2021-02-25 NOTE — TELEPHONE ENCOUNTER
----- Message from Dee Esparza DO sent at 2/25/2021  9:26 AM EST -----  Please call the patient with these biopsy results  Biopsies the small bowel were benign and negative for celiac disease  Biopsy of the stomach pouch was benign and negative for Helicobacter pylori    The

## 2021-04-26 ENCOUNTER — TELEPHONE (OUTPATIENT)
Dept: NEPHROLOGY | Facility: CLINIC | Age: 36
End: 2021-04-26

## 2021-10-07 DIAGNOSIS — R11.2 NAUSEA AND VOMITING, INTRACTABILITY OF VOMITING NOT SPECIFIED, UNSPECIFIED VOMITING TYPE: ICD-10-CM

## 2021-10-07 DIAGNOSIS — R10.33 PERIUMBILICAL PAIN: ICD-10-CM

## 2021-10-07 RX ORDER — PANTOPRAZOLE SODIUM 40 MG/1
TABLET, DELAYED RELEASE ORAL
Qty: 60 TABLET | Refills: 0 | Status: SHIPPED | OUTPATIENT
Start: 2021-10-07 | End: 2022-05-05 | Stop reason: SDUPTHER

## 2022-01-06 DIAGNOSIS — R10.33 PERIUMBILICAL PAIN: ICD-10-CM

## 2022-01-06 DIAGNOSIS — R11.2 NAUSEA AND VOMITING, INTRACTABILITY OF VOMITING NOT SPECIFIED, UNSPECIFIED VOMITING TYPE: ICD-10-CM

## 2022-01-06 RX ORDER — SUCRALFATE ORAL 1 G/10ML
SUSPENSION ORAL
Qty: 420 ML | Refills: 3 | Status: SHIPPED | OUTPATIENT
Start: 2022-01-06 | End: 2022-05-05

## 2022-01-12 ENCOUNTER — OFFICE VISIT (OUTPATIENT)
Dept: FAMILY MEDICINE CLINIC | Facility: CLINIC | Age: 37
End: 2022-01-12

## 2022-01-12 VITALS
WEIGHT: 140 LBS | BODY MASS INDEX: 23.9 KG/M2 | DIASTOLIC BLOOD PRESSURE: 70 MMHG | HEART RATE: 75 BPM | TEMPERATURE: 98.2 F | SYSTOLIC BLOOD PRESSURE: 112 MMHG | OXYGEN SATURATION: 99 % | HEIGHT: 64 IN

## 2022-01-12 DIAGNOSIS — Z02.89 ENCOUNTER FOR PHYSICAL EXAMINATION RELATED TO EMPLOYMENT: Primary | ICD-10-CM

## 2022-01-12 PROCEDURE — 99499 UNLISTED E&M SERVICE: CPT | Performed by: PHYSICIAN ASSISTANT

## 2022-01-12 RX ORDER — TOPIRAMATE 50 MG/1
50 TABLET, FILM COATED ORAL
COMMUNITY
Start: 2021-10-07 | End: 2022-01-31

## 2022-01-12 RX ORDER — DOXYCYCLINE 100 MG/1
TABLET ORAL
COMMUNITY
Start: 2022-01-04 | End: 2022-03-17

## 2022-01-12 RX ORDER — PREGABALIN 200 MG/1
200 CAPSULE ORAL 2 TIMES DAILY
COMMUNITY
Start: 2022-01-03 | End: 2022-01-31

## 2022-01-12 RX ORDER — PERMETHRIN 1 G/100ML
LOTION TOPICAL
COMMUNITY
Start: 2021-10-27 | End: 2022-04-18

## 2022-01-12 RX ORDER — CLINDAMYCIN PHOSPHATE 10 UG/ML
LOTION TOPICAL
COMMUNITY
Start: 2021-12-26 | End: 2022-03-17

## 2022-01-12 RX ORDER — MAGNESIUM 200 MG
TABLET ORAL
COMMUNITY
Start: 2022-01-03

## 2022-01-12 NOTE — PROGRESS NOTES
Assessment/Plan:          Diagnoses and all orders for this visit:    Encounter for physical examination related to employment    Other orders  -     doxycycline (ADOXA) 100 MG tablet; TAKE ONE TABLET BY MOUTH TWICE A DAY FOR ONE MONTH (TAKE WITH FOOD)  -     levonorgestrel (MIRENA) 20 MCG/24HR IUD; 1 each by Intrauterine route  -     Lice Treatment Creme Rinse 1 % liquid; APPLY TOPICALLY ONE TIME FOR 1 DOSE  -     pregabalin (LYRICA) 200 MG capsule; Take 200 mg by mouth 2 (two) times a day  -     topiramate (TOPAMAX) 50 MG tablet; Take 50 mg by mouth daily at bedtime  -     triamcinolone (KENALOG) 0 1 % ointment; APPLY TO AREAS OF RASH ON BODY AND SIDE OF LIPS TWO TIMES A DAY FOR 2 WEEKS, THEN ONLY 2-3 TIMES WEEKLY AS NEEDED (MIX WITH MUPIROCIN)  -     clindamycin (CLEOCIN T) 1 % lotion; APPLY TO PIMPLE LIKE RASH ON BODY TWICE DAILY FOR 3 WEEKS UNTIL CLEAR   -     Cyanocobalamin (B-12) 1000 MCG SUBL; PLACE ONE TABLET UNDER THE TONGUE EVERY DAY            Subjective:      Patient ID: Leigh Ann Escamilla is a 43 y o  female  Pt is here for [facility]  exam  She is doing well and has no recent change in health  The following portions of the patient's history were reviewed and updated as appropriate:   She has a past medical history of Anemia, Chronic pain disorder, Depression, Diabetes mellitus ([address] ), Gilbert's syndrome, Neuropathy, and Vitamin D deficiency  ,  does not have any pertinent problems on file  ,   has a past surgical history that includes Gastric bypass;  section; and pr excise excess skin tissue,abdomen (N/A, 2020)  ,  family history includes Drug abuse in her mother; Mental illness in her mother  ,   reports that she has quit smoking  She has never used smokeless tobacco  She reports current alcohol use  She reports that she does not use drugs  ,  has No Known Allergies     Current Outpatient Medications   Medication Sig Dispense Refill    CVS VITAMIN C 250 MG tablet TAKE 1 TABLET BY MOUTH TWICE A DAY WITH IRON  11    dicyclomine (BENTYL) 20 mg tablet TAKE 1 TABLET BY MOUTH EVERY 6 HOURS 60 tablet 3    ergocalciferol (VITAMIN D2) 50,000 units TAKE 1 CAPSULE BY MOUTH ONE TIME PER WEEK  0    ferrous sulfate 325 (65 Fe) mg tablet TAKE 1 TABLET BY MOUTH TWICE A DAY WITH A MEAL AND VITAMIN C 250 MG  3    linaCLOtide (Linzess) 72 MCG CAPS Take 1 capsule by mouth daily before breakfast 30 capsule 5    Multiple Vitamin (MULTI-VITAMIN PO) Take by mouth      pantoprazole (PROTONIX) 40 mg tablet TAKE ONE TABLET BY MOUTH TWICE A DAY 60 tablet 0    pregabalin (LYRICA) 150 mg capsule Take 150 mg by mouth 2 (two) times a day  5    sucralfate (CARAFATE) 1 g/10 mL suspension TAKE 10ML (1GRAM)  BY MOUTH FOUR TIMES A DAY WITH MEALS AND AT BEDTIME 420 mL 3    clindamycin (CLEOCIN T) 1 % lotion APPLY TO PIMPLE LIKE RASH ON BODY TWICE DAILY FOR 3 WEEKS UNTIL CLEAR   Cyanocobalamin (B-12) 1000 MCG SUBL PLACE ONE TABLET UNDER THE TONGUE EVERY DAY      doxycycline (ADOXA) 100 MG tablet TAKE ONE TABLET BY MOUTH TWICE A DAY FOR ONE MONTH (TAKE WITH FOOD)      levonorgestrel (MIRENA) 20 MCG/24HR IUD 1 each by Intrauterine route      Lice Treatment Creme Rinse 1 % liquid APPLY TOPICALLY ONE TIME FOR 1 DOSE      pregabalin (LYRICA) 200 MG capsule Take 200 mg by mouth 2 (two) times a day      topiramate (TOPAMAX) 50 MG tablet Take 50 mg by mouth daily at bedtime      triamcinolone (KENALOG) 0 1 % ointment APPLY TO AREAS OF RASH ON BODY AND SIDE OF LIPS TWO TIMES A DAY FOR 2 WEEKS, THEN ONLY 2-3 TIMES WEEKLY AS NEEDED (MIX WITH MUPIROCIN)       No current facility-administered medications for this visit  Review of Systems   Constitutional: Negative for appetite change, fatigue, fever and unexpected weight change  HENT: Negative for dental problem, ear pain, hearing loss, mouth sores, nosebleeds, rhinorrhea, tinnitus, trouble swallowing and voice change      Eyes: Negative for photophobia, pain, discharge and visual disturbance  Respiratory: Negative for cough, chest tightness, shortness of breath and wheezing  Cardiovascular: Negative for chest pain and palpitations  Gastrointestinal: Negative for abdominal pain, blood in stool, constipation, diarrhea, nausea, rectal pain and vomiting  Endocrine: Negative for cold intolerance, polydipsia, polyphagia and polyuria  Genitourinary: Negative for decreased urine volume, difficulty urinating, dysuria, frequency, genital sores, hematuria and urgency  Musculoskeletal: Negative for arthralgias, back pain, gait problem, joint swelling, myalgias, neck pain and neck stiffness  Skin: Negative for color change and rash  Allergic/Immunologic: Negative for environmental allergies, food allergies and immunocompromised state  Neurological: Negative for dizziness, seizures, speech difficulty, light-headedness and headaches  Hematological: Negative for adenopathy  Does not bruise/bleed easily  Psychiatric/Behavioral: Negative for behavioral problems, confusion, decreased concentration, self-injury and sleep disturbance  The patient is not nervous/anxious and is not hyperactive  Objective:  Vitals:    01/12/22 1110   BP: 112/70   BP Location: Left arm   Patient Position: Sitting   Cuff Size: Adult   Pulse: 75   Temp: 98 2 °F (36 8 °C)   TempSrc: Tympanic   SpO2: 99%   Weight: 63 5 kg (140 lb)   Height: 5' 4" (1 626 m)     Body mass index is 24 03 kg/m²  Physical Exam  Constitutional:       Appearance: Normal appearance  She is well-developed  HENT:      Head: Normocephalic and atraumatic  Right Ear: Tympanic membrane, ear canal and external ear normal       Left Ear: Tympanic membrane, ear canal and external ear normal       Nose: Nose normal       Mouth/Throat:      Mouth: Mucous membranes are moist       Pharynx: Oropharynx is clear  Eyes:      Extraocular Movements: Extraocular movements intact        Conjunctiva/sclera: Conjunctivae normal       Pupils: Pupils are equal, round, and reactive to light  Neck:      Thyroid: No thyromegaly  Vascular: No carotid bruit  Cardiovascular:      Rate and Rhythm: Normal rate and regular rhythm  Pulses: Normal pulses  Heart sounds: Normal heart sounds  Pulmonary:      Effort: Pulmonary effort is normal       Breath sounds: Normal breath sounds  Abdominal:      General: Abdomen is flat  Bowel sounds are normal       Palpations: Abdomen is soft  There is no mass  Tenderness: There is no abdominal tenderness  There is no right CVA tenderness or left CVA tenderness  Musculoskeletal:         General: Normal range of motion  Cervical back: Normal range of motion  Lymphadenopathy:      Cervical: No cervical adenopathy  Skin:     General: Skin is warm and dry  Neurological:      General: No focal deficit present  Mental Status: She is alert and oriented to person, place, and time  Deep Tendon Reflexes: Reflexes are normal and symmetric  Psychiatric:         Mood and Affect: Mood normal          Behavior: Behavior normal          Thought Content:  Thought content normal          Judgment: Judgment normal

## 2022-01-31 ENCOUNTER — TELEPHONE (OUTPATIENT)
Dept: ADMINISTRATIVE | Facility: OTHER | Age: 37
End: 2022-01-31

## 2022-01-31 ENCOUNTER — TELEMEDICINE (OUTPATIENT)
Dept: INTERNAL MEDICINE CLINIC | Facility: CLINIC | Age: 37
End: 2022-01-31
Payer: COMMERCIAL

## 2022-01-31 DIAGNOSIS — E13.42 DIABETIC POLYNEUROPATHY ASSOCIATED WITH OTHER SPECIFIED DIABETES MELLITUS (HCC): ICD-10-CM

## 2022-01-31 DIAGNOSIS — D50.9 IRON DEFICIENCY ANEMIA, UNSPECIFIED IRON DEFICIENCY ANEMIA TYPE: ICD-10-CM

## 2022-01-31 DIAGNOSIS — Z13.220 ENCOUNTER FOR LIPID SCREENING FOR CARDIOVASCULAR DISEASE: ICD-10-CM

## 2022-01-31 DIAGNOSIS — Z13.6 ENCOUNTER FOR LIPID SCREENING FOR CARDIOVASCULAR DISEASE: ICD-10-CM

## 2022-01-31 DIAGNOSIS — B88.0 INFESTATION BY DEMODEX: ICD-10-CM

## 2022-01-31 DIAGNOSIS — R21 RASH AND NONSPECIFIC SKIN ERUPTION: Primary | ICD-10-CM

## 2022-01-31 PROCEDURE — 99213 OFFICE O/P EST LOW 20 MIN: CPT | Performed by: INTERNAL MEDICINE

## 2022-01-31 RX ORDER — HYDROXYZINE HYDROCHLORIDE 10 MG/1
10 TABLET, FILM COATED ORAL EVERY 6 HOURS PRN
Qty: 30 TABLET | Refills: 0 | Status: SHIPPED | OUTPATIENT
Start: 2022-01-31 | End: 2022-03-16 | Stop reason: SDUPTHER

## 2022-01-31 NOTE — TELEPHONE ENCOUNTER
----- Message from Gomez Ocampo sent at 1/31/2022 11:15 AM EST -----  01/31/22 11:16 AM    Hello, our patient Lazaro Rachel has had HIV completed/performed  Please assist in updating the patient chart by pulling the Care Everywhere (CE) document  The date of service is 10/26/2019       Thank you,  Miryam Hooks  [facility] [place]

## 2022-01-31 NOTE — TELEPHONE ENCOUNTER
----- Message from César Springer sent at 1/31/2022 11:12 AM EST -----  01/31/22 11:12 AM    Hello, our patient Valorie Laguerre has had Pap Smear (HPV) aka Cervical Cancer Screening completed/performed  Please assist in updating the patient chart by pulling the Care Everywhere (CE) document  The date of service is 10/01/2020       Thank you,  Miryam Hooks  [facility] [place]

## 2022-01-31 NOTE — TELEPHONE ENCOUNTER
Upon review of the In Basket request we were able to locate, review, and update the patient chart as requested for Hepatitis C   Any additional questions or concerns should be emailed to the Practice Liaisons via [email or web address] email, please do not reply via In Basket      Thank you  Marita Reece MA

## 2022-01-31 NOTE — TELEPHONE ENCOUNTER
Upon review of the In Basket request we were able to locate, review, and update the patient chart as requested for Pap Smear (HPV) aka Cervical Cancer Screening  Any additional questions or concerns should be emailed to the Practice Liaisons via [email or web address] email, please do not reply via In Basket      Thank you  Baron Brar MA

## 2022-01-31 NOTE — TELEPHONE ENCOUNTER
----- Message from Natalee Yoder sent at 1/31/2022 11:13 AM EST -----  01/31/22 11:13 AM    Hello, our patient Diamond Siddiqui has had Hepatitis C completed/performed  Please assist in updating the patient chart by pulling the Care Everywhere (CE) document  The date of service is 10/26/2020       Thank you,  Miryam Hooks  [facility] [place]

## 2022-01-31 NOTE — TELEPHONE ENCOUNTER
----- Message from Patricio Fay sent at 1/31/2022 11:15 AM EST -----  01/31/22 11:16 AM    Hello, our patient Chris Arenas has had HIV completed/performed  Please assist in updating the patient chart by pulling the Care Everywhere (CE) document  The date of service is 10/26/2019       Thank you,  Miryam Hooks  [facility]  [facility]

## 2022-01-31 NOTE — PROGRESS NOTES
Virtual Regular Visit    Verification of patient location:    Patient is located in the following state in which I hold an active license [place]      Assessment/Plan:    Problem List Items Addressed This Visit        Endocrine    Diabetic polyneuropathy ([address] )    Relevant Medications    Lyrica 200 MG capsule    Other Relevant Orders    Comprehensive metabolic panel    HEMOGLOBIN A1C W/ EAG ESTIMATION      Other Visit Diagnoses     Rash and nonspecific skin eruption    -  Primary    Relevant Medications    hydrOXYzine HCL (ATARAX) 10 mg tablet    permethrin (NIX) 1 % liquid    Other Relevant Orders    Ambulatory Referral to Dermatology    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        Relevant Medications    hydrOXYzine HCL (ATARAX) 10 mg tablet    permethrin (NIX) 1 % liquid    Other Relevant Orders    Ambulatory Referral to Dermatology    Iron deficiency anemia, unspecified iron deficiency anemia type        Relevant Orders    CBC and differential    TSH, 3rd generation with Free T4 reflex    Encounter for lipid screening for cardiovascular disease        Relevant Orders    Lipid Panel with Direct LDL reflex               Reason for visit is   Chief Complaint   Patient presents with   [facility] Virtual Regular Visit        Encounter provider Rajan Martines MD    Provider located at [address] [place] [address]  [place] T[place] [id number] [phone]      Recent Visits  No visits were found meeting these conditions  Showing recent visits within past 7 days and meeting all other requirements  Today's Visits  Date Type Provider Dept   01/31/22 Susanne Fothergill, MD [facility] [address] today's visits and meeting all other requirements  Future Appointments  No visits were found meeting these conditions    Showing future appointments within next 150 days and meeting all other requirements       The patient was identified by name and date of birth  Geraldo Stearns was informed that this is a telemedicine visit and that the visit is being conducted through [facility] and patient was informed this is a secure, HIPAA-complaint platform  She agrees to proceed     My office door was closed  No one else was in the room  She acknowledged consent and understanding of privacy and security of the video platform  The patient has agreed to participate and understands they can discontinue the visit at any time  Patient is aware this is a billable service  Subjective  Geraldo Stearns is a 43 y o  female   Pt is a 42 yo female here to establish care with me  She reports h/o 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 that she has been dealing with for the past few months  She has seen multiple providers including her last PCP, NP from [facility], and urgent care  Treated with creams, ointments  Also taking a course of doxycycline x 3 months  Urgent care had told her she needs to have a skin scraping  She will be seeing derm this Wednesday and reporting concerns  I will refer her to dermatology with Dr Denny Cruz  She sent me pictures via phone  She has a PMH for gastric bypass which caused her iron deficiency anemia  Was seeing hem/onc in the past  Was on iron infusions but they were too painful  H/o Type 2 DM with painful diabetic polyneuropathy  Will order labs and follow up in the office  GYN consult for pap         Past Medical History:   Diagnosis Date    Anemia     iron def    Chronic pain disorder     feet    Depression     Diabetes mellitus (HCC)     neg since gastric bypass    GERD (gastroesophageal reflux disease)     Gilbert's syndrome     Neuropathy     feet sheila    Vitamin D deficiency        Past Surgical History:   Procedure Laterality Date     SECTION      X3    CHOLECYSTECTOMY      GASTRIC BYPASS      HERNIA REPAIR      [place] EXCISE EXCESS SKIN TISSUE,ABDOMEN N/A 2020    Procedure: ABDOMINALPLASTY,CIRCUMFERENTIAL;  Surgeon: Italo Mcleod MD;  Location: [facility] MAIN OR;  Service: Plastics       Current Outpatient Medications   Medication Sig Dispense Refill    clindamycin (CLEOCIN T) 1 % lotion APPLY TO PIMPLE LIKE RASH ON BODY TWICE DAILY FOR 3 WEEKS UNTIL CLEAR   CVS VITAMIN C 250 MG tablet TAKE 1 TABLET BY MOUTH TWICE A DAY WITH IRON  11    Cyanocobalamin (B-12) 1000 MCG SUBL PLACE ONE TABLET UNDER THE TONGUE EVERY DAY      dicyclomine (BENTYL) 20 mg tablet TAKE 1 TABLET BY MOUTH EVERY 6 HOURS 60 tablet 3    doxycycline (ADOXA) 100 MG tablet TAKE ONE TABLET BY MOUTH TWICE A DAY FOR ONE MONTH (TAKE WITH FOOD)      ergocalciferol (VITAMIN D2) 50,000 units TAKE 1 CAPSULE BY MOUTH ONE TIME PER WEEK  0    ferrous sulfate 325 (65 Fe) mg tablet TAKE 1 TABLET BY MOUTH TWICE A DAY WITH A MEAL AND VITAMIN C 259 MG  3    Lice Treatment Creme Rinse 1 % liquid APPLY TOPICALLY ONE TIME FOR 1 DOSE      Multiple Vitamin (MULTI-VITAMIN PO) Take by mouth      pantoprazole (PROTONIX) 40 mg tablet TAKE ONE TABLET BY MOUTH TWICE A DAY 60 tablet 0    sucralfate (CARAFATE) 1 g/10 mL suspension TAKE 10ML (1GRAM)  BY MOUTH FOUR TIMES A DAY WITH MEALS AND AT BEDTIME 420 mL 3    triamcinolone (KENALOG) 0 1 % ointment APPLY TO AREAS OF RASH ON BODY AND SIDE OF LIPS TWO TIMES A DAY FOR 2 WEEKS, THEN ONLY 2-3 TIMES WEEKLY AS NEEDED (MIX WITH MUPIROCIN)      hydrOXYzine HCL (ATARAX) 10 mg tablet Take 1 tablet (10 mg total) by mouth every 6 (six) hours as needed for itching 30 tablet 0    Lyrica 200 MG capsule Take 1 capsule (200 mg total) by mouth 2 (two) times a day 60 capsule 3    permethrin (NIX) 1 % liquid Apply 1 application topically once for 1 dose 120 mL 1     No current facility-administered medications for this visit  No Known Allergies    Review of Systems   Skin: Positive for color change and rash  Psychiatric/Behavioral: The patient is nervous/anxious      All other systems reviewed and are negative  Video Exam    There were no vitals filed for this visit  Physical Exam     I spent 15 minutes directly with the patient during this visit    VIRTUAL VISIT Maximo Haq verbally agrees to participate in [employer]  Pt is aware that [employer] could be limited without vital signs or the ability to perform a full hands-on physical exam  Zunilda Haq understands she or the provider may request at any time to terminate the video visit and request the patient to seek care or treatment in person

## 2022-03-10 ENCOUNTER — HOSPITAL ENCOUNTER (EMERGENCY)
Facility: HOSPITAL | Age: 37
Discharge: HOME | End: 2022-03-11
Attending: EMERGENCY MEDICINE
Payer: COMMERCIAL

## 2022-03-10 DIAGNOSIS — R21 RASH AND NONSPECIFIC SKIN ERUPTION: Primary | ICD-10-CM

## 2022-03-10 PROCEDURE — 99283 EMERGENCY DEPT VISIT LOW MDM: CPT

## 2022-03-11 VITALS
HEART RATE: 101 BPM | TEMPERATURE: 98.9 F | RESPIRATION RATE: 18 BRPM | OXYGEN SATURATION: 98 % | DIASTOLIC BLOOD PRESSURE: 65 MMHG | SYSTOLIC BLOOD PRESSURE: 144 MMHG

## 2022-03-11 PROCEDURE — 99284 EMERGENCY DEPT VISIT MOD MDM: CPT | Performed by: SURGERY

## 2022-03-11 RX ORDER — DEXAMETHASONE 4 MG/1
4 TABLET ORAL ONCE
Status: COMPLETED | OUTPATIENT
Start: 2022-03-11 | End: 2022-03-11

## 2022-03-11 RX ORDER — DESONIDE 0.5 MG/G
OINTMENT TOPICAL 2 TIMES DAILY
Qty: 15 G | Refills: 0 | Status: SHIPPED | OUTPATIENT
Start: 2022-03-11 | End: 2022-04-18

## 2022-03-11 RX ORDER — DIPHENHYDRAMINE HCL 25 MG
25 TABLET ORAL ONCE
Status: COMPLETED | OUTPATIENT
Start: 2022-03-11 | End: 2022-03-11

## 2022-03-11 RX ADMIN — DIPHENHYDRAMINE HYDROCHLORIDE 25 MG: 25 TABLET ORAL at 00:24

## 2022-03-11 RX ADMIN — DEXAMETHASONE 4 MG: 4 TABLET ORAL at 00:23

## 2022-03-11 NOTE — DISCHARGE INSTRUCTIONS
Please return to the ED if you begin to experience any new or worsening symptoms, chest pain, shortness of breath, lightheadedness, dizziness, changes to vision, syncopal episodes, numbness, tingling, or weakness in the extremities, difficulty walking/swallowing/breathing  Please follow-up with dermatologist within the next 1-2 weeks

## 2022-03-11 NOTE — ED PROVIDER NOTES
History  Chief Complaint   Patient presents with    Rash     pt states last june she started with rash after getting iron infusions which she has since stopped, pt states she does not remember where the rash initially presented however states it is all over her body now  pt has been seen by multiple drs and been prescribed multiple medications with no relief  pt now complaining of throat irritation and eye irritation over the past few days     Susu Salvador is a 43 y o  female with a pertinent past medical history for Gilbert's syndrome, GERD, DM presenting today with a rash diffusely over her bilateral lower and upper extremities  The patient reports that she has had this rash for about 1 year and has been getting progressively worse prompting the ED visit  The patient reports that she had recently been seen by dermatology and PCP multiple times in given multiple treatments with no effect  The patient reports that today the itchiness has gotten significantly worse and is affecting her eyes  Patient with no difficulty breathing, speaking, swallowing  She denies any other symptoms such as chest pain, shortness of breath, lightheadedness, dizziness, syncopal episodes  Patient with difficulty obtaining follow-up with dermatology at this time seeking referral for infectious disease doctor  No further complaints at this time  Prior to Admission Medications   Prescriptions Last Dose Informant Patient Reported? Taking?    [employer] VITAMIN C 250 MG tablet   Yes No   Sig: TAKE 1 TABLET BY MOUTH TWICE A DAY WITH IRON   Cyanocobalamin (B-12) 1000 MCG SUBL   Yes No   Sig: PLACE ONE TABLET UNDER THE TONGUE EVERY DAY   Lice Treatment Creme Rinse 1 % liquid   Yes No   Sig: APPLY TOPICALLY ONE TIME FOR 1 DOSE   Lyrica 200 MG capsule   No No   Sig: Take 1 capsule (200 mg total) by mouth 2 (two) times a day   Multiple Vitamin (MULTI-VITAMIN PO)   Yes No   Sig: Take by mouth   clindamycin (CLEOCIN T) 1 % lotion   Yes No   Sig: APPLY TO PIMPLE LIKE RASH ON BODY TWICE DAILY FOR 3 WEEKS UNTIL CLEAR  dicyclomine (BENTYL) 20 mg tablet   No No   Sig: TAKE 1 TABLET BY MOUTH EVERY 6 HOURS   doxycycline (ADOXA) 100 MG tablet   Yes No   Sig: TAKE ONE TABLET BY MOUTH TWICE A DAY FOR ONE MONTH (TAKE WITH FOOD)   ergocalciferol (VITAMIN D2) 50,000 units   Yes No   Sig: TAKE 1 CAPSULE BY MOUTH ONE TIME PER WEEK   ferrous sulfate 325 (65 Fe) mg tablet   Yes No   Sig: TAKE 1 TABLET BY MOUTH TWICE A DAY WITH A MEAL AND VITAMIN C 250 MG   hydrOXYzine HCL (ATARAX) 10 mg tablet   No No   Sig: Take 1 tablet (10 mg total) by mouth every 6 (six) hours as needed for itching   pantoprazole (PROTONIX) 40 mg tablet   No No   Sig: TAKE ONE TABLET BY MOUTH TWICE A DAY   sucralfate (CARAFATE) 1 g/10 mL suspension   No No   Sig: TAKE 10ML (1GRAM)  BY MOUTH FOUR TIMES A DAY WITH MEALS AND AT BEDTIME   triamcinolone (KENALOG) 0 1 % ointment   Yes No   Sig: APPLY TO AREAS OF RASH ON BODY AND SIDE OF LIPS TWO TIMES A DAY FOR 2 WEEKS, THEN ONLY 2-3 TIMES WEEKLY AS NEEDED (MIX WITH MUPIROCIN)      Facility-Administered Medications: None       Past Medical History:   Diagnosis Date    Anemia     iron def    Chronic pain disorder     feet    Depression     Diabetes mellitus (HCC)     neg since gastric bypass    GERD (gastroesophageal reflux disease)     Gilbert's syndrome     Neuropathy     feet sheila    Vitamin D deficiency        Past Surgical History:   Procedure Laterality Date     SECTION      X3    CHOLECYSTECTOMY      GASTRIC BYPASS      HERNIA REPAIR      VA EXCISE EXCESS SKIN TISSUE,ABDOMEN N/A 2020    Procedure: ABDOMINALPLASTY,CIRCUMFERENTIAL;  Surgeon: Rasheed Andrea MD;  Location: [address] [place] [address];  Service: Plastics       Family History   Problem Relation Age of Onset    Mental illness Mother     Drug abuse Mother      I have reviewed and agree with the history as documented      E-Cigarette/Vaping    E-Cigarette Use Never User E-Cigarette/Vaping Substances    Nicotine No     THC No     CBD No     Flavoring No     Other No     Unknown No      Social History     Tobacco Use    Smoking status: Former Smoker     Packs/day: 0 50     Years: 5 00     Pack years: 2 50     Types: Cigarettes    Smokeless tobacco: Never Used   Vaping Use    Vaping Use: Never used   Substance Use Topics    Alcohol use: Not Currently     Alcohol/week: 0 0 standard drinks     Comment: once a month    Drug use: Never       Review of Systems   Constitutional: Negative for activity change, chills, diaphoresis and fever  HENT: Negative for congestion, ear discharge, ear pain, rhinorrhea, sore throat and trouble swallowing  Eyes: Negative for pain, discharge, redness and visual disturbance  Respiratory: Negative for cough, chest tightness, shortness of breath and wheezing  Cardiovascular: Negative for chest pain, palpitations and leg swelling  Gastrointestinal: Negative for abdominal distention, abdominal pain, blood in stool, constipation, diarrhea, nausea and vomiting  Genitourinary: Negative for difficulty urinating, dysuria, flank pain, frequency, hematuria and urgency  Musculoskeletal: Negative for arthralgias, myalgias, neck pain and neck stiffness  Skin: Positive for rash  Negative for color change  Neurological: Negative for dizziness, syncope, facial asymmetry, weakness, light-headedness, numbness and headaches  Physical Exam  Physical Exam  Constitutional:       General: She is not in acute distress  Appearance: Normal appearance  She is not ill-appearing  HENT:      Head: Normocephalic and atraumatic  Right Ear: Tympanic membrane normal       Left Ear: Tympanic membrane normal       Nose: Nose normal  No congestion or rhinorrhea  Mouth/Throat:      Mouth: Mucous membranes are moist       Pharynx: Oropharynx is clear  No oropharyngeal exudate or posterior oropharyngeal erythema     Eyes:      Extraocular Movements: Extraocular movements intact  Conjunctiva/sclera: Conjunctivae normal       Pupils: Pupils are equal, round, and reactive to light  Cardiovascular:      Rate and Rhythm: Normal rate and regular rhythm  Pulses: Normal pulses  Heart sounds: Normal heart sounds  Pulmonary:      Effort: Pulmonary effort is normal  No respiratory distress  Breath sounds: Normal breath sounds  No wheezing  Abdominal:      General: Abdomen is flat  Bowel sounds are normal  There is no distension  Palpations: Abdomen is soft  There is no mass  Tenderness: There is no abdominal tenderness  There is no right CVA tenderness, left CVA tenderness or guarding  Hernia: No hernia is present  Musculoskeletal:         General: No swelling, tenderness or deformity  Normal range of motion  Cervical back: Normal range of motion and neck supple  No rigidity or tenderness  Right lower leg: No edema  Left lower leg: No edema  Skin:     General: Skin is warm and dry  Capillary Refill: Capillary refill takes less than 2 seconds  Coloration: Skin is not jaundiced  Findings: Rash (Diffuse maculopapular rash throughout bilateral lower and upper extremities  ) present  No erythema  Neurological:      General: No focal deficit present  Mental Status: She is alert and oriented to person, place, and time  Cranial Nerves: No cranial nerve deficit  Sensory: No sensory deficit  Motor: No weakness        Coordination: Coordination normal       Gait: Gait normal       Deep Tendon Reflexes: Reflexes normal          Vital Signs  ED Triage Vitals   Temperature Pulse Respirations Blood Pressure SpO2   03/11/22 0013 03/10/22 2347 03/10/22 2347 03/10/22 2347 03/10/22 2347   98 9 °F (37 2 °C) 101 18 144/65 98 %      Temp Source Heart Rate Source Patient Position - Orthostatic VS BP Location FiO2 (%)   03/10/22 2347 03/10/22 2347 03/10/22 2347 03/10/22 2347 --   Oral Monitor Sitting Right arm       Pain Score       --                  Vitals:    03/10/22 2347   BP: 144/65   Pulse: 101   Patient Position - Orthostatic VS: Sitting         Visual Acuity      ED Medications  Medications   dexamethasone (DECADRON) tablet 4 mg (4 mg Oral Given 3/11/22 0023)   diphenhydrAMINE (BENADRYL) tablet 25 mg (25 mg Oral Given 3/11/22 0024)       Diagnostic Studies  Results Reviewed     None                 No orders to display              Procedures  Procedures         ED Course                               SBIRT 22yo+      Most Recent Value   SBIRT (22 yo +)    In order to provide better care to our patients, we are screening all of our patients for alcohol and drug use  Would it be okay to ask you these screening questions? Yes Filed at: 03/10/2022 2357   Initial Alcohol Screen: US AUDIT-C     1  How often do you have a drink containing alcohol? 2 Filed at: 03/10/2022 2357   2  How many drinks containing alcohol do you have on a typical day you are drinking? 2 Filed at: 03/10/2022 2357   3b  FEMALE Any Age, or MALE 65+: How often do you have 4 or more drinks on one occassion? 0 Filed at: 03/10/2022 2357   Audit-C Score 4 Filed at: 03/10/2022 2357   CONNIE: How many times in the past year have you    Used an illegal drug or used a prescription medication for non-medical reasons? Never Filed at: 03/10/2022 2357                    MDM  Number of Diagnoses or Management Options  Rash and nonspecific skin eruption: minor  Diagnosis management comments: Recommended close follow-up with dermatology/PCP/infectious disease doctor (as per patient request )  Advised her to return with any new or worsening symptoms, difficulty breathing, swallowing, speaking, swelling to the oral airway  She demonstrated understanding         Amount and/or Complexity of Data Reviewed  Review and summarize past medical records: yes    Risk of Complications, Morbidity, and/or Mortality  Presenting problems: low  Diagnostic procedures: low  Management options: low    Patient Progress  Patient progress: stable      Disposition  Final diagnoses:   Rash and nonspecific skin eruption     Time reflects when diagnosis was documented in both MDM as applicable and the Disposition within this note     Time User Action Codes Description Comment    3/11/2022 12:32 AM Avelina Shone Add [R21] Rash     3/11/2022 12:32 AM Elmer Canela Add [R21] Rash and nonspecific skin eruption     3/11/2022 12:32 AM Avelina Shone Modify [R21] Rash and nonspecific skin eruption     3/11/2022 12:32 AM Darryle [place] [R21] Rash       ED Disposition     ED Disposition Condition Date/Time Comment    Discharge Stable Fri Mar 11, 2022 12:32 AM Diamond Pae discharge to home/self care  Follow-up Information     Follow up With Specialties Details Why Contact Info Additional Information    Anatoliy Mansfield MD Internal Medicine Schedule an appointment as soon as possible for a visit   [facility] [phone]  [facility] 2  [facility] [phone]       [facility]   [facility] [id number] [phone] [facility] /Aron Ceron [place], [place], [address] [place] [address] Dermatology Dermatology Call today to schedule follow-up appointment   [phone]  [address] [facility], [place], [place], [address]          Discharge Medication List as of 3/11/2022 12:35 AM      START taking these medications    Details   desonide (DESOWEN) 0 05 % ointment Apply topically 2 (two) times a day, Starting Fri 3/11/2022, Normal         CONTINUE these medications which have NOT CHANGED    Details   clindamycin (CLEOCIN T) 1 % lotion APPLY TO PIMPLE LIKE RASH ON BODY TWICE DAILY FOR 3 WEEKS UNTIL CLEAR , Historical Med      CVS VITAMIN C 250 MG tablet TAKE 1 TABLET BY MOUTH TWICE A DAY WITH IRON, Historical Med      Cyanocobalamin (B-12) 1000 MCG SUBL PLACE ONE TABLET UNDER THE TONGUE EVERY DAY, Historical Med      dicyclomine (BENTYL) 20 mg tablet TAKE 1 TABLET BY MOUTH EVERY 6 HOURS, Normal      doxycycline (ADOXA) 100 MG tablet TAKE ONE TABLET BY MOUTH TWICE A DAY FOR ONE MONTH (TAKE WITH FOOD), Historical Med      ergocalciferol (VITAMIN D2) 50,000 units TAKE 1 CAPSULE BY MOUTH ONE TIME PER WEEK, Historical Med      ferrous sulfate 325 (65 Fe) mg tablet TAKE 1 TABLET BY MOUTH TWICE A DAY WITH A MEAL AND VITAMIN C 250 MG, Historical Med      hydrOXYzine HCL (ATARAX) 10 mg tablet Take 1 tablet (10 mg total) by mouth every 6 (six) hours as needed for itching, Starting Mon 3/31/1549, Normal      Lice Treatment Creme Rinse 1 % liquid APPLY TOPICALLY ONE TIME FOR 1 DOSE, Historical Med      Lyrica 200 MG capsule Take 1 capsule (200 mg total) by mouth 2 (two) times a day, Starting Mon 1/31/2022, Until Wed 3/2/2022, Normal      Multiple Vitamin (MULTI-VITAMIN PO) Take by mouth, Historical Med      pantoprazole (PROTONIX) 40 mg tablet TAKE ONE TABLET BY MOUTH TWICE A DAY, Normal      sucralfate (CARAFATE) 1 g/10 mL suspension TAKE 10ML (1GRAM)  BY MOUTH FOUR TIMES A DAY WITH MEALS AND AT BEDTIME, Normal      triamcinolone (KENALOG) 0 1 % ointment APPLY TO AREAS OF RASH ON BODY AND SIDE OF LIPS TWO TIMES A DAY FOR 2 WEEKS, THEN ONLY 2-3 TIMES WEEKLY AS NEEDED (MIX WITH MUPIROCIN), Historical Med             No discharge procedures on file      PDMP Review     None          ED Provider  Electronically Signed by           Deana Sibley PA-C  03/11/22 1551

## 2022-03-16 ENCOUNTER — OFFICE VISIT (OUTPATIENT)
Dept: DERMATOLOGY | Facility: CLINIC | Age: 37
End: 2022-03-16
Payer: COMMERCIAL

## 2022-03-16 VITALS — TEMPERATURE: 97.5 F | WEIGHT: 139.2 LBS | BODY MASS INDEX: 23.89 KG/M2

## 2022-03-16 DIAGNOSIS — L29.9 PRURITUS: Primary | ICD-10-CM

## 2022-03-16 DIAGNOSIS — T07.XXXA MULTIPLE EXCORIATIONS: ICD-10-CM

## 2022-03-16 DIAGNOSIS — R21 RASH AND NONSPECIFIC SKIN ERUPTION: ICD-10-CM

## 2022-03-16 DIAGNOSIS — B88.0 INFESTATION BY DEMODEX: ICD-10-CM

## 2022-03-16 PROCEDURE — 99204 OFFICE O/P NEW MOD 45 MIN: CPT | Performed by: DERMATOLOGY

## 2022-03-16 RX ORDER — BETAMETHASONE DIPROPIONATE 0.5 MG/G
CREAM TOPICAL 2 TIMES DAILY
Qty: 45 G | Refills: 1 | Status: SHIPPED | OUTPATIENT
Start: 2022-03-16

## 2022-03-16 RX ORDER — HYDROXYZINE HYDROCHLORIDE 10 MG/1
10 TABLET, FILM COATED ORAL EVERY 6 HOURS PRN
Qty: 60 TABLET | Refills: 2 | Status: SHIPPED | OUTPATIENT
Start: 2022-03-16

## 2022-03-16 NOTE — PROGRESS NOTES
[facility]  [address] [place] [address] Ritesh Joseph [address]  [phone]  [phone]     MRN: [id number] : 1985  Encounter: [id number]  Patient Information: Nilam Betancur  Chief complaint:  Insect bites    History of present illness:  61-year-old female presents for concerns regarding itching irritation since last   Patient works as a  concerned the that these may be mites has seen numerous doctors emergency rooms and [facility] without getting much of an answer been given some creams and other medications with minimal changes  Patient also had a [facility] performed at [facility] which did not show any signs of any infestation    Past Medical History:   Diagnosis Date    Anemia     iron def    Chronic pain disorder     feet    Depression     Diabetes mellitus (HCC)     neg since gastric bypass    GERD (gastroesophageal reflux disease)     Gilbert's syndrome     Neuropathy     feet sheila    Vitamin D deficiency      Past Surgical History:   Procedure Laterality Date     SECTION      X3    CHOLECYSTECTOMY      GASTRIC BYPASS      HERNIA REPAIR      OR EXCISE EXCESS SKIN TISSUE,ABDOMEN N/A 2020    Procedure: ABDOMINALPLASTY,CIRCUMFERENTIAL;  Surgeon: Laurita Mac MD;  Location: [facility];  Service: Plastics     Social History   Social History     Substance and Sexual Activity   Alcohol Use Not Currently    Alcohol/week: 0 0 standard drinks    Comment: once a month     Social History     Substance and Sexual Activity   Drug Use Never     Social History     Tobacco Use   Smoking Status Former Smoker    Packs/day: 0 50    Years: 5 00    Pack years: 2 50    Types: Cigarettes   Smokeless Tobacco Never Used     Family History   Problem Relation Age of Onset    Mental illness Mother     Drug abuse Mother      Meds/Allergies   No Known Allergies    Meds:  Prior to Admission medications    Medication Sig Start Date End Date Taking? Authorizing Provider   CVS VITAMIN C 250 MG tablet TAKE 1 TABLET BY MOUTH TWICE A DAY WITH IRON 12/16/19  Yes Historical Provider, MD   Cyanocobalamin (B-12) 1000 MCG SUBL PLACE ONE TABLET UNDER THE TONGUE EVERY DAY 1/3/22  Yes Historical Provider, MD   desonide (DESOWEN) 0 05 % ointment Apply topically 2 (two) times a day 3/11/22  Yes Marleni Liriano PA-C   ergocalciferol (VITAMIN D2) 50,000 units TAKE 1 CAPSULE BY MOUTH ONE TIME PER WEEK 12/21/19  Yes Historical Provider, MD   ferrous sulfate 325 (65 Fe) mg tablet TAKE 1 TABLET BY MOUTH TWICE A DAY WITH A MEAL AND VITAMIN C 250 MG 12/16/19  Yes Historical Provider, MD   Lice Treatment Creme Rinse 1 % liquid APPLY TOPICALLY ONE TIME FOR 1 DOSE 10/27/21  Yes Historical Provider, MD   Multiple Vitamin (MULTI-VITAMIN PO) Take by mouth   Yes Historical Provider, MD   pantoprazole (PROTONIX) 40 mg tablet TAKE ONE TABLET BY MOUTH TWICE A DAY 10/7/21  Yes Ronald Acharya PA-C   triamcinolone (KENALOG) 0 1 % ointment APPLY TO AREAS OF RASH ON BODY AND SIDE OF LIPS TWO TIMES A DAY FOR 2 WEEKS, THEN ONLY 2-3 TIMES WEEKLY AS NEEDED (MIX WITH MUPIROCIN) 12/26/21  Yes Historical Provider, MD   clindamycin (CLEOCIN T) 1 % lotion APPLY TO PIMPLE LIKE RASH ON BODY TWICE DAILY FOR 3 WEEKS UNTIL CLEAR    Patient not taking: Reported on 3/16/2022 12/26/21   Historical Provider, MD   dicyclomine (BENTYL) 20 mg tablet TAKE 1 TABLET BY MOUTH EVERY 6 HOURS  Patient not taking: Reported on 3/16/2022 2/10/21   Ronald Acharya PA-C   doxycycline (ADOXA) 100 MG tablet TAKE ONE TABLET BY MOUTH TWICE A DAY FOR ONE MONTH (TAKE WITH FOOD)  Patient not taking: Reported on 3/16/2022 1/4/22   Historical Provider, MD   hydrOXYzine HCL (ATARAX) 10 mg tablet Take 1 tablet (10 mg total) by mouth every 6 (six) hours as needed for itching  Patient not taking: Reported on 3/16/2022  1/31/22   Danny Rasmussen MD   Lyrica 200 MG capsule Take 1 capsule (200 mg total) by mouth 2 (two) times a day 1/31/22 3/2/22  Danny Sterling MD   sucralfate (CARAFATE) 1 g/10 mL suspension TAKE 10ML (1GRAM)  BY MOUTH FOUR TIMES A DAY WITH MEALS AND AT BEDTIME 1/6/22 2/5/22  Chevy Nelson PA-C       Subjective:     Review of Systems:    General: negative for - chills, fatigue, fever,  weight gain or weight loss  Psychological: negative for - anxiety, behavioral disorder, concentration difficulties, decreased libido, depression, irritability, memory difficulties, mood swings, sleep disturbances or suicidal ideation  ENT: negative for - hearing difficulties , nasal congestion, nasal discharge, oral lesions, sinus pain, sneezing, sore throat  Allergy and Immunology: negative for - hives, insect bite sensitivity,  Hematological and Lymphatic: negative for - bleeding problems, blood clots,bruising, swollen lymph nodes  Endocrine: negative for - hair pattern changes, hot flashes, malaise/lethargy, mood swings, palpitations, polydipsia/polyuria, skin changes, temperature intolerance or unexpected weight change  Respiratory: negative for - cough, hemoptysis, orthopnea, shortness of breath, or wheezing  Cardiovascular: negative for - chest pain, dyspnea on exertion, edema,  Gastrointestinal: negative for - abdominal pain, nausea/vomiting  Genito-Urinary: negative for - dysuria, incontinence, irregular/heavy menses or urinary frequency/urgency  Musculoskeletal: negative for - gait disturbance, joint pain, joint stiffness, joint swelling, muscle pain, muscular weakness  Dermatological:  As in HPI  Neurological: negative for confusion, dizziness, headaches, impaired coordination/balance, memory loss, numbness/tingling, seizures, speech problems, tremors or weakness       Objective:   Temp 97 5 °F (36 4 °C) (Temporal)   Wt 63 1 kg (139 lb 3 2 oz)   BMI 23 89 kg/m²     Physical Exam:    General Appearance:    Alert, cooperative, no distress   Head:    Normocephalic, without obvious abnormality, atraumatic Skin:   A full skin exam was performed including scalp, head scalp, eyes, ears, nose, lips, neck, chest, axilla, abdomen, back, buttocks, bilateral upper extremities, bilateral lower extremities, hands, feet, fingers, toes, fingernails, and toenails excoriated papules widespread areas of the body without primary process noted     Assessment:     1  Multiple excoriations  betamethasone dipropionate (DIPROSONE) 0 05 % cream   2  Rash and nonspecific skin eruption  Ambulatory Referral to Dermatology    hydrOXYzine HCL (ATARAX) 10 mg tablet   3  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  Ambulatory Referral to Dermatology    hydrOXYzine HCL (ATARAX) 10 mg tablet   4  Pruritus           Plan: At present will go ahead and obtained blood work to see if there is any primary process that may be causing her itching in addition if no other problems noted will go ahead treat with hydroxyzine along with a topical steroid will see the patient back in 3 weeks  at and if no improvement will discuss other options  Lizet Maldonado MD  3/16/2022,9:11 AM    Portions of the record may have been created with voice recognition software   Occasional wrong word or "sound a like" substitutions may have occurred due to the inherent limitations of voice recognition software   Read the chart carefully and recognize, using context, where substitutions have occurred

## 2022-03-16 NOTE — PATIENT INSTRUCTIONS
At present will go ahead and obtained blood work to see if there is any primary process that may be causing her itching in addition if no other problems noted will go ahead treat with hydroxyzine along with a topical steroid will see the patient back in 3 weeks  at and if no improvement will discuss other options

## 2022-03-17 ENCOUNTER — OFFICE VISIT (OUTPATIENT)
Dept: INTERNAL MEDICINE CLINIC | Facility: CLINIC | Age: 37
End: 2022-03-17
Payer: COMMERCIAL

## 2022-03-17 VITALS
TEMPERATURE: 98.6 F | SYSTOLIC BLOOD PRESSURE: 116 MMHG | HEART RATE: 76 BPM | WEIGHT: 140.2 LBS | BODY MASS INDEX: 23.93 KG/M2 | DIASTOLIC BLOOD PRESSURE: 76 MMHG | HEIGHT: 64 IN | OXYGEN SATURATION: 99 %

## 2022-03-17 DIAGNOSIS — B88.0 INFESTATION BY DEMODEX: ICD-10-CM

## 2022-03-17 DIAGNOSIS — R21 RASH AND NONSPECIFIC SKIN ERUPTION: ICD-10-CM

## 2022-03-17 DIAGNOSIS — E11.9 CONTROLLED TYPE 2 DIABETES MELLITUS WITHOUT COMPLICATION, WITHOUT LONG-TERM CURRENT USE OF INSULIN (HCC): Primary | ICD-10-CM

## 2022-03-17 DIAGNOSIS — D50.9 IRON DEFICIENCY ANEMIA, UNSPECIFIED IRON DEFICIENCY ANEMIA TYPE: ICD-10-CM

## 2022-03-17 DIAGNOSIS — F31.9 BIPOLAR AFFECTIVE DISORDER, REMISSION STATUS UNSPECIFIED (HCC): ICD-10-CM

## 2022-03-17 DIAGNOSIS — Z02.89 ENCOUNTER FOR PHYSICAL EXAMINATION RELATED TO EMPLOYMENT: ICD-10-CM

## 2022-03-17 DIAGNOSIS — E08.42 DIABETIC POLYNEUROPATHY ASSOCIATED WITH DIABETES MELLITUS DUE TO UNDERLYING CONDITION (HCC): ICD-10-CM

## 2022-03-17 LAB — SL AMB POCT HEMOGLOBIN AIC: 6.5 (ref ?–6.5)

## 2022-03-17 PROCEDURE — 3725F SCREEN DEPRESSION PERFORMED: CPT | Performed by: INTERNAL MEDICINE

## 2022-03-17 PROCEDURE — 1036F TOBACCO NON-USER: CPT | Performed by: INTERNAL MEDICINE

## 2022-03-17 PROCEDURE — 3008F BODY MASS INDEX DOCD: CPT | Performed by: INTERNAL MEDICINE

## 2022-03-17 PROCEDURE — 99214 OFFICE O/P EST MOD 30 MIN: CPT | Performed by: INTERNAL MEDICINE

## 2022-03-17 PROCEDURE — 3044F HG A1C LEVEL LT 7.0%: CPT | Performed by: INTERNAL MEDICINE

## 2022-03-17 PROCEDURE — 83036 HEMOGLOBIN GLYCOSYLATED A1C: CPT | Performed by: INTERNAL MEDICINE

## 2022-03-17 RX ORDER — ARIPIPRAZOLE 5 MG/1
5 TABLET ORAL DAILY
Qty: 30 TABLET | Refills: 0 | Status: SHIPPED | OUTPATIENT
Start: 2022-03-17 | End: 2022-04-18

## 2022-03-17 NOTE — PROGRESS NOTES
Patient's shoes and socks removed  Right Foot/Ankle   Right Foot Inspection  Skin Exam: skin normal and skin intact  No dry skin, no warmth, no callus, no erythema, no maceration, no abnormal color, no pre-ulcer, no ulcer and no callus  Toe Exam: ROM and strength within normal limits  Sensory   Monofilament testing: diminished    Vascular  The right DP pulse is 2+  Right Toe  - Comprehensive Exam  Ecchymosis: none  Swelling: none         Left Foot/Ankle  Left Foot Inspection  Skin Exam: skin normal and skin intact  No dry skin, no warmth, no erythema, no maceration, normal color, no pre-ulcer, no ulcer and no callus  Toe Exam: ROM and strength within normal limits  Sensory   Monofilament testing: diminished    Vascular  The left DP pulse is 2+       Left Toe  - Comprehensive Exam  Ecchymosis: none  Swelling: none           Assign Risk Category  No deformity present  No loss of protective sensation  No weak pulses  Risk: 0

## 2022-03-17 NOTE — PROGRESS NOTES
Assessment/Plan:      Quality Measures:     Depression Screening and Follow-up Plan: Patient was screened for depression during today's encounter  They screened negative with a PHQ-2 score of 0  Clincally patient does not have depression  No treatment is required  Return in about 4 months (around 7/17/2022)  No problem-specific Assessment & Plan notes found for this encounter  Diagnoses and all orders for this visit:    Controlled type 2 diabetes mellitus without complication, without long-term current use of insulin ([facility])  -     POCT urine microalbumin/creatinine  -     POCT hemoglobin A1c    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    Iron deficiency anemia, unspecified iron deficiency anemia type    Rash and nonspecific skin eruption    Bipolar affective disorder, remission status unspecified ([facility])  -     ARIPiprazole (ABILIFY) 5 mg tablet; Take 1 tablet (5 mg total) by mouth daily    Diabetic polyneuropathy associated with diabetes mellitus due to underlying condition ([facility] )          Subjective:      Patient ID: Lenna Sacks is a 43 y o  female  Patient is a pleasant 24-year-old female here to establish care she has a past medical history for type 2 diabetes and diabetic neuropathy  She is a  and has five kids  She is currently following with dermatology for a rash she has been having for over a year  She is trying a new steroid cream which seems to be helping  a1c is 6 5  she is due for a diabetic eye exam  Obtain labs  We will try Abilify for Bipolar DO  We will f/u in 6 weeks        ALLERGIES:  No Known Allergies    CURRENT MEDICATIONS:    Current Outpatient Medications:     betamethasone dipropionate (DIPROSONE) 0 05 % cream, Apply topically 2 (two) times a day, Disp: 45 g, Rfl: 1    CVS VITAMIN C 250 MG tablet, TAKE 1 TABLET BY MOUTH TWICE A DAY WITH IRON, Disp: , Rfl: 11    Cyanocobalamin (B-12) 1000 MCG SUBL, PLACE ONE TABLET UNDER THE TONGUE EVERY DAY, Disp: , Rfl:    desonide (DESOWEN) 0 05 % ointment, Apply topically 2 (two) times a day, Disp: 15 g, Rfl: 0    ergocalciferol (VITAMIN D2) 50,000 units, TAKE 1 CAPSULE BY MOUTH ONE TIME PER WEEK, Disp: , Rfl: 0    ferrous sulfate 325 (65 Fe) mg tablet, TAKE 1 TABLET BY MOUTH TWICE A DAY WITH A MEAL AND VITAMIN C 250 MG, Disp: , Rfl: 3    hydrOXYzine HCL (ATARAX) 10 mg tablet, Take 1 tablet (10 mg total) by mouth every 6 (six) hours as needed for itching, Disp: 60 tablet, Rfl: 2    Lice Treatment Creme Rinse 1 % liquid, APPLY TOPICALLY ONE TIME FOR 1 DOSE, Disp: , Rfl:     Lyrica 200 MG capsule, Take 1 capsule (200 mg total) by mouth 2 (two) times a day, Disp: 60 capsule, Rfl: 3    Multiple Vitamin (MULTI-VITAMIN PO), Take by mouth, Disp: , Rfl:     pantoprazole (PROTONIX) 40 mg tablet, TAKE ONE TABLET BY MOUTH TWICE A DAY, Disp: 60 tablet, Rfl: 0    sucralfate (CARAFATE) 1 g/10 mL suspension, TAKE 10ML (1GRAM)  BY MOUTH FOUR TIMES A DAY WITH MEALS AND AT BEDTIME, Disp: 420 mL, Rfl: 3    triamcinolone (KENALOG) 0 1 % ointment, APPLY TO AREAS OF RASH ON BODY AND SIDE OF LIPS TWO TIMES A DAY FOR 2 WEEKS, THEN ONLY 2-3 TIMES WEEKLY AS NEEDED (MIX WITH MUPIROCIN), Disp: , Rfl:     ARIPiprazole (ABILIFY) 5 mg tablet, Take 1 tablet (5 mg total) by mouth daily, Disp: 30 tablet, Rfl: 0    ACTIVE PROBLEM LIST:  Patient Active Problem List   Diagnosis    Encounter for physical examination related to employment    Vitamin D deficiency    Gilbert's syndrome    Bipolar affective ([facility] )    Controlled type 2 diabetes mellitus without complication, without long-term current use of insulin (HCC)    Diabetic polyneuropathy (HCC)    Lipodystrophy       PAST MEDICAL HISTORY:  Past Medical History:   Diagnosis Date    Anemia     iron def    Chronic pain disorder     feet    Depression     Diabetes mellitus (HCC)     neg since gastric bypass    GERD (gastroesophageal reflux disease)     Gilbert's syndrome     Neuropathy     feet sheila    Vitamin D deficiency        PAST SURGICAL HISTORY:  Past Surgical History:   Procedure Laterality Date     SECTION      X3    CHOLECYSTECTOMY      GASTRIC BYPASS      HERNIA REPAIR      HI EXCISE EXCESS SKIN TISSUE,ABDOMEN N/A 2020    Procedure: [place] [facility];  Surgeon: Jazmin Contreras MD;  Location: [address] [place] [address] OR;  Service: Plastics       FAMILY HISTORY:  Family History   Problem Relation Age of Onset    Mental illness Mother     Drug abuse Mother        SOCIAL HISTORY:  Social History     Socioeconomic History    Marital status: /Civil Union     Spouse name: Not on file    Number of children: Not on file    Years of education: Not on file    Highest education level: Not on file   Occupational History    Occupation:    Tobacco Use    Smoking status: Former Smoker     Packs/day: 0 50     Years: 5 00     Pack years: 2 50     Types: Cigarettes    Smokeless tobacco: Never Used   Vaping Use    Vaping Use: Never used   Substance and Sexual Activity    Alcohol use: Not Currently     Alcohol/week: 0 0 standard drinks     Comment: once a month    Drug use: Never    Sexual activity: Not Currently     Partners: Male     Birth control/protection: None   Other Topics Concern    Not on file   Social History Narrative    Not on file     Social Determinants of Health     Financial Resource Strain: Not on file   Food Insecurity: Not on file   Transportation Needs: Not on file   Physical Activity: Not on file   Stress: Not on file   Social Connections: Not on file   Intimate Partner Violence: Not on file   Housing Stability: Not on file       Review of Systems   Skin: Positive for rash  Psychiatric/Behavioral: The patient is nervous/anxious and is hyperactive  All other systems reviewed and are negative          Objective:  Vitals:    22 1033   BP: 116/76   BP Location: Left arm   Patient Position: Sitting   Cuff Size: Adult   Pulse: 76   Temp: 98 6 °F (37 °C)   TempSrc: Tympanic   SpO2: 99%   Weight: 63 6 kg (140 lb 3 2 oz)   Height: 5' 4" (1 626 m)     Body mass index is 24 07 kg/m²  Physical Exam  Vitals and nursing note reviewed  Constitutional:       Appearance: Normal appearance  HENT:      Head: Normocephalic and atraumatic  Right Ear: Tympanic membrane normal       Left Ear: Tympanic membrane normal       Nose: Nose normal       Mouth/Throat:      Mouth: Mucous membranes are moist       Pharynx: Oropharynx is clear  Cardiovascular:      Rate and Rhythm: Normal rate and regular rhythm  Heart sounds: Normal heart sounds  Pulmonary:      Effort: Pulmonary effort is normal       Breath sounds: Normal breath sounds  Abdominal:      General: Bowel sounds are normal       Palpations: Abdomen is soft  Musculoskeletal:         General: Normal range of motion  Cervical back: Normal range of motion  Right lower leg: No edema  Left lower leg: No edema  Lymphadenopathy:      Cervical: No cervical adenopathy  Skin:     General: Skin is warm and dry  Neurological:      General: No focal deficit present  Mental Status: She is alert and oriented to person, place, and time  Mental status is at baseline  Psychiatric:         Mood and Affect: Mood normal            RESULTS:    Recent Results (from the past 1008 hour(s))   POCT hemoglobin A1c    Collection Time: 03/17/22 10:47 AM   Result Value Ref Range    Hemoglobin A1C 6 5 6 5       This note was created with voice recognition software  Phonic, grammatical and spelling errors may be present within the note as a result

## 2022-03-24 ENCOUNTER — TELEPHONE (OUTPATIENT)
Dept: INTERNAL MEDICINE CLINIC | Facility: CLINIC | Age: 37
End: 2022-03-24

## 2022-03-25 ENCOUNTER — TELEPHONE (OUTPATIENT)
Dept: INTERNAL MEDICINE CLINIC | Facility: CLINIC | Age: 37
End: 2022-03-25

## 2022-03-25 NOTE — TELEPHONE ENCOUNTER
Patient came in to the office for rash and you told patient to follow with the dermatologist  Patient just went to Urgent Care and patient was dx  With scabies  They will not give her the medication she needs told her she needs to call her pcp to get a script for two doses of  Ivermectin tablet  Patient is covered in bites and now her kids are also cover in these bites  Please advise        Brayden Howard

## 2022-03-25 NOTE — TELEPHONE ENCOUNTER
Spoke with the patient she is refusing an in person visit with Dr Aamir Cadena because she is at work and she said she will get out of work too late  She said at this time she is going to an urgent care again to be seen  I advised her to call us if she needs any other assistance

## 2022-03-25 NOTE — TELEPHONE ENCOUNTER
Called and LM informing patient Ivermectin is not the treatment for Scabies, we need to know which urgent care she went to so we can get documentation

## 2022-03-27 ENCOUNTER — HOSPITAL ENCOUNTER (EMERGENCY)
Facility: HOSPITAL | Age: 37
Discharge: HOME | End: 2022-03-27
Attending: EMERGENCY MEDICINE | Admitting: EMERGENCY MEDICINE
Payer: COMMERCIAL

## 2022-03-27 VITALS
SYSTOLIC BLOOD PRESSURE: 128 MMHG | BODY MASS INDEX: 23.9 KG/M2 | HEIGHT: 64 IN | DIASTOLIC BLOOD PRESSURE: 53 MMHG | TEMPERATURE: 98.3 F | RESPIRATION RATE: 18 BRPM | OXYGEN SATURATION: 100 % | WEIGHT: 140 LBS | HEART RATE: 79 BPM

## 2022-03-27 DIAGNOSIS — R21 RASH: Primary | ICD-10-CM

## 2022-03-27 PROCEDURE — 99284 EMERGENCY DEPT VISIT MOD MDM: CPT | Performed by: EMERGENCY MEDICINE

## 2022-03-27 PROCEDURE — 99282 EMERGENCY DEPT VISIT SF MDM: CPT

## 2022-03-27 RX ORDER — AMOXICILLIN AND CLAVULANATE POTASSIUM 875; 125 MG/1; MG/1
1 TABLET, FILM COATED ORAL EVERY 12 HOURS
Qty: 14 TABLET | Refills: 0 | Status: SHIPPED | OUTPATIENT
Start: 2022-03-27 | End: 2022-04-03

## 2022-03-27 RX ORDER — AMOXICILLIN AND CLAVULANATE POTASSIUM 875; 125 MG/1; MG/1
1 TABLET, FILM COATED ORAL ONCE
Status: COMPLETED | OUTPATIENT
Start: 2022-03-27 | End: 2022-03-27

## 2022-03-27 RX ADMIN — AMOXICILLIN AND CLAVULANATE POTASSIUM 1 TABLET: 875; 125 TABLET, FILM COATED ORAL at 15:35

## 2022-03-27 NOTE — DISCHARGE INSTRUCTIONS
Augmentin twice daily to fight infection  Follow-up with the Dermatology Clinic all Dr Gabino Sanchez [facility]

## 2022-03-27 NOTE — ED PROVIDER NOTES
History  Chief Complaint   Patient presents with    Rash     pt c/o rash on b/l arms, ankles and chest since june HPI patient is a 76-year-old female who is completely frustrated after having a rash over the last several years  Patient reports that she several years ago woke up in now [employer] accident apparently had multiple areas of red induration on her skin  Patient reports the areas have persisted  Patient reports she has seen dermatologist she saw Dr Caren Hammans here locally and apparently another dermatologist apparently she had a skin biopsy and they were unable to tell her which she had  Patient reports now some of the areas have become somewhat red and indurated  Patient reports that recently someone told she has scabies which she refused to take the ivermectin because she does not believe it scabies because there was no lesions between her fingers  Patient reports he has been treated multiple times for scabies and with antibiotics for skin infections without relief  Past medical history chronic rash, depression, diabetes  Family history noncontributory  Social history former smoker no history of drug abuse    Prior to Admission Medications   Prescriptions Last Dose Informant Patient Reported? Taking?    ARIPiprazole (ABILIFY) 5 mg tablet   No No   Sig: Take 1 tablet (5 mg total) by mouth daily   CVS VITAMIN C 250 MG tablet   Yes No   Sig: TAKE 1 TABLET BY MOUTH TWICE A DAY WITH IRON   Cyanocobalamin (B-12) 1000 MCG SUBL   Yes No   Sig: PLACE ONE TABLET UNDER THE TONGUE EVERY DAY   Lice Treatment Creme Rinse 1 % liquid   Yes No   Sig: APPLY TOPICALLY ONE TIME FOR 1 DOSE   Lyrica 200 MG capsule   No No   Sig: Take 1 capsule (200 mg total) by mouth 2 (two) times a day   Multiple Vitamin (MULTI-VITAMIN PO)   Yes No   Sig: Take by mouth   betamethasone dipropionate (DIPROSONE) 0 05 % cream   No No   Sig: Apply topically 2 (two) times a day   desonide (DESOWEN) 0 05 % ointment   No No   Sig: Apply topically 2 (two) times a day   ergocalciferol (VITAMIN D2) 50,000 units   Yes No   Sig: TAKE 1 CAPSULE BY MOUTH ONE TIME PER WEEK   ferrous sulfate 325 (65 Fe) mg tablet   Yes No   Sig: TAKE 1 TABLET BY MOUTH TWICE A DAY WITH A MEAL AND VITAMIN C 250 MG   hydrOXYzine HCL (ATARAX) 10 mg tablet   No No   Sig: Take 1 tablet (10 mg total) by mouth every 6 (six) hours as needed for itching   pantoprazole (PROTONIX) 40 mg tablet   No No   Sig: TAKE ONE TABLET BY MOUTH TWICE A DAY   sucralfate (CARAFATE) 1 g/10 mL suspension   No No   Sig: TAKE 10ML (1GRAM)  BY MOUTH FOUR TIMES A DAY WITH MEALS AND AT BEDTIME   triamcinolone (KENALOG) 0 1 % ointment   Yes No   Sig: APPLY TO AREAS OF RASH ON BODY AND SIDE OF LIPS TWO TIMES A DAY FOR 2 WEEKS, THEN ONLY 2-3 TIMES WEEKLY AS NEEDED (MIX WITH MUPIROCIN)      Facility-Administered Medications: None       Past Medical History:   Diagnosis Date    Anemia     iron def    Chronic pain disorder     feet    Depression     Diabetes mellitus (HCC)     neg since gastric bypass    GERD (gastroesophageal reflux disease)     Gilbert's syndrome     Neuropathy     feet sheila    Vitamin D deficiency        Past Surgical History:   Procedure Laterality Date     SECTION      X3    CHOLECYSTECTOMY      GASTRIC BYPASS      HERNIA REPAIR      MA EXCISE EXCESS SKIN TISSUE,ABDOMEN N/A 2020    Procedure: ABDOMINALPLASTY,CIRCUMFERENTIAL;  Surgeon: Jhon Muñoz MD;  [facility]: [facility] MAIN OR;  Service: Plastics       Family History   Problem Relation Age of Onset    Mental illness Mother     Drug abuse Mother      I have reviewed and agree with the history as documented      E-Cigarette/Vaping    E-Cigarette Use Never User      E-Cigarette/Vaping Substances    Nicotine No     THC No     CBD No     Flavoring No     Other No     Unknown No      Social History     Tobacco Use    Smoking status: Former Smoker     Packs/day: 0 50     Years: 5 00     Pack years: 2 50     Types: Cigarettes    Smokeless tobacco: Never Used   Vaping Use    Vaping Use: Never used   Substance Use Topics    Alcohol use: Not Currently     Alcohol/week: 0 0 standard drinks     Comment: once a month    Drug use: Never       Review of Systems   Constitutional: Negative for fever  HENT: Negative for congestion  Eyes: Negative for pain and redness  Respiratory: Negative for cough and shortness of breath  Cardiovascular: Negative for chest pain  Gastrointestinal: Negative for abdominal pain and vomiting  Skin: Positive for rash  Physical Exam  Physical Exam  Vitals and nursing note reviewed  Constitutional:       Appearance: She is well-developed  HENT:      Head: Normocephalic  Right Ear: External ear normal       Left Ear: External ear normal       Nose: Nose normal    Eyes:      General: Lids are normal       Pupils: Pupils are equal, round, and reactive to light  Pulmonary:      Effort: Pulmonary effort is normal  No respiratory distress  Musculoskeletal:         General: No deformity  Normal range of motion  Cervical back: Normal range of motion and neck supple  Skin:     General: Skin is warm and dry  Comments: Patient has a pustular type rash all over her body primarily on her arms but also on her ears face and torso  Some of the areas have some area of induration around consistent with a localized cellulitis  No rash between the fingers no sign of scabies   Neurological:      Mental Status: She is alert and oriented to person, place, and time           Vital Signs  ED Triage Vitals   Temperature Pulse Respirations Blood Pressure SpO2   03/27/22 1537 03/27/22 1525 03/27/22 1525 03/27/22 1537 03/27/22 1525   98 3 °F (36 8 °C) 75 18 128/53 100 %      Temp Source Heart Rate Source Patient Position - Orthostatic VS BP Location FiO2 (%)   03/27/22 1537 03/27/22 1525 03/27/22 1525 03/27/22 1525 --   Oral Monitor Lying Right arm       Pain Score       -- Vitals:    03/27/22 1525 03/27/22 1530 03/27/22 1537   BP:   128/53   Pulse: 75 79    Patient Position - Orthostatic VS: Lying           Visual Acuity      ED Medications  Medications   amoxicillin-clavulanate (AUGMENTIN) 875-125 mg per tablet 1 tablet (1 tablet Oral Given 3/27/22 1535)       Diagnostic Studies  Results Reviewed     None                 No orders to display              Procedures  Procedures         ED Course                               SBIRT 22yo+      Most Recent Value   SBIRT (24 yo +)    In order to provide better care to our patients, we are screening all of our patients for alcohol and drug use  Would it be okay to ask you these screening questions? Yes Filed at: 03/27/2022 1530   Initial Alcohol Screen: US AUDIT-C     1  How often do you have a drink containing alcohol? 2 Filed at: 03/27/2022 1530   2  How many drinks containing alcohol do you have on a typical day you are drinking? 1 Filed at: 03/27/2022 1530   3a  Male UNDER 65: How often do you have five or more drinks on one occasion? 0 Filed at: 03/27/2022 1530   3b  FEMALE Any Age, or MALE 65+: How often do you have 4 or more drinks on one occassion? 0 Filed at: 03/27/2022 1530   Audit-C Score 3 Filed at: 03/27/2022 1530   CONNIE: How many times in the past year have you    Used an illegal drug or used a prescription medication for non-medical reasons? Never Filed at: 03/27/2022 1530                    MDM medical decision making 68-year-old female with a rash for several years seen multiple dermatologist without relief skin biopsy without diagnosis  Discussed with patient I believe she has an area which looks secondarily infected, discussed treatment with antibiotics she did not want to take doxycycline but will take Augmentin  We discussed the need for dermatology follow-up we discussed the Dermatology Clinic at [facility] where possibly she could be seen more quickly and be followed by residence      Disposition  Final diagnoses:   Rash     Time reflects when diagnosis was documented in both MDM as applicable and the Disposition within this note     Time User Action Codes Description Comment    3/27/2022  3:30 PM AdenBeladarryl 47 Rash       ED Disposition     ED Disposition Condition Date/Time Comment    Discharge Stable Sun Mar 27, 2022  3:30 PM Keena Winston discharge to home/self care              Follow-up Information     Follow up With Specialties Details Why Contact Info    Yariel Vergara MD Dermatology, Pediatric Dermatology   [address] [place] [address]  [phone]            Discharge Medication List as of 3/27/2022  3:31 PM      START taking these medications    Details   amoxicillin-clavulanate (AUGMENTIN) 875-125 mg per tablet Take 1 tablet by mouth every 12 (twelve) hours for 7 days, Starting Sun 3/27/2022, Until Sun 4/3/2022, Print         CONTINUE these medications which have NOT CHANGED    Details   ARIPiprazole (ABILIFY) 5 mg tablet Take 1 tablet (5 mg total) by mouth daily, Starting Thu 3/17/2022, Until Sat 4/16/2022, Normal      betamethasone dipropionate (DIPROSONE) 0 05 % cream Apply topically 2 (two) times a day, Starting Wed 3/16/2022, Normal      CVS VITAMIN C 250 MG tablet TAKE 1 TABLET BY MOUTH TWICE A DAY WITH IRON, Historical Med      Cyanocobalamin (B-12) 1000 MCG SUBL PLACE ONE TABLET UNDER THE TONGUE EVERY DAY, Historical Med      desonide (DESOWEN) 0 05 % ointment Apply topically 2 (two) times a day, Starting Fri 3/11/2022, Normal      ergocalciferol (VITAMIN D2) 50,000 units TAKE 1 CAPSULE BY MOUTH ONE TIME PER WEEK, Historical Med      ferrous sulfate 325 (65 Fe) mg tablet TAKE 1 TABLET BY MOUTH TWICE A DAY WITH A MEAL AND VITAMIN C 250 MG, Historical Med      hydrOXYzine HCL (ATARAX) 10 mg tablet Take 1 tablet (10 mg total) by mouth every 6 (six) hours as needed for itching, Starting Wed 9/17/9533, Normal      Lice Treatment Creme Rinse 1 % liquid APPLY TOPICALLY ONE TIME FOR 1 DOSE, Historical Med      Lyrica 200 MG capsule Take 1 capsule (200 mg total) by mouth 2 (two) times a day, Starting Mon 1/31/2022, Until Thu 3/17/2022, Normal      Multiple Vitamin (MULTI-VITAMIN PO) Take by mouth, Historical Med      pantoprazole (PROTONIX) 40 mg tablet TAKE ONE TABLET BY MOUTH TWICE A DAY, Normal      sucralfate (CARAFATE) 1 g/10 mL suspension TAKE 10ML (1GRAM)  BY MOUTH FOUR TIMES A DAY WITH MEALS AND AT BEDTIME, Normal      triamcinolone (KENALOG) 0 1 % ointment APPLY TO AREAS OF RASH ON BODY AND SIDE OF LIPS TWO TIMES A DAY FOR 2 WEEKS, THEN ONLY 2-3 TIMES WEEKLY AS NEEDED (MIX WITH MUPIROCIN), Historical Med             No discharge procedures on file      PDMP Review     None          ED Provider  Electronically Signed by           Electa Kehr, MD  03/27/22 1686

## 2022-03-30 ENCOUNTER — TELEPHONE (OUTPATIENT)
Dept: GASTROENTEROLOGY | Facility: CLINIC | Age: 37
End: 2022-03-30

## 2022-03-30 NOTE — TELEPHONE ENCOUNTER
Opal patient - Patient LMOM to [facility] for EGD  RTRN'd call and LMOM to call [phone], option 1 to scheduleOV    Thx

## 2022-04-04 ENCOUNTER — TELEPHONE (OUTPATIENT)
Dept: INFECTIOUS DISEASES | Facility: CLINIC | Age: 37
End: 2022-04-04

## 2022-04-04 NOTE — TELEPHONE ENCOUNTER
Pt was referred to office by ED  However per office policy referrals need to come from PCP or other specialty after doing all appropriate testing  This pt has been seen and given treatment options that she did not want to continue with  She should follow up with PCP and should further evaluation be needed PCP can contact the office for further guidance

## 2022-04-14 ENCOUNTER — OFFICE VISIT (OUTPATIENT)
Dept: DERMATOLOGY | Facility: CLINIC | Age: 37
End: 2022-04-14
Payer: COMMERCIAL

## 2022-04-14 VITALS — WEIGHT: 138 LBS | HEIGHT: 64 IN | TEMPERATURE: 98 F | BODY MASS INDEX: 23.56 KG/M2

## 2022-04-14 DIAGNOSIS — R21 RASH AND NONSPECIFIC SKIN ERUPTION: Primary | ICD-10-CM

## 2022-04-14 DIAGNOSIS — E61.1 IRON DEFICIENCY: ICD-10-CM

## 2022-04-14 DIAGNOSIS — L29.9 PRURITUS: ICD-10-CM

## 2022-04-14 PROCEDURE — 1036F TOBACCO NON-USER: CPT | Performed by: DERMATOLOGY

## 2022-04-14 PROCEDURE — 99213 OFFICE O/P EST LOW 20 MIN: CPT | Performed by: DERMATOLOGY

## 2022-04-14 PROCEDURE — 3008F BODY MASS INDEX DOCD: CPT | Performed by: DERMATOLOGY

## 2022-04-14 NOTE — PROGRESS NOTES
[address] [facility] Note     Patient Name: Corbin Aiken  Encounter Date: 4/14/2022     Have you been cared for by a [facility] Dermatologist in the last 3 years and, if so, which one? YES, Dr Jacinta Manuel on 3/16/22    · Have you traveled outside of the [address] [place] [address] in the past 3 months or outside of the [facility] area in the last 2 weeks? No     May we call your Preferred Phone number to discuss your specific medical information? Yes     May we leave a detailed message that includes your specific medical information? Yes      Today's Chief Concerns:   Concern #1:  Itchy rash on face, arms, chest, upper back, legs       Past Medical History:  Have you personally ever had or currently have any of the following? · Skin cancer (such as Melanoma, Basal Cell Carcinoma, Squamous Cell Carcinoma? (If Yes, please provide more detail)- No  · Eczema: No  · Psoriasis: No  · HIV/AIDS: No  · Hepatitis B or C: No  · Tuberculosis: No  · Systemic Immunosuppression such as Diabetes, Biologic or Immunotherapy, Chemotherapy, Organ Transplantation, Bone Marrow Transplantation (If YES, please provide more detail): YES, Diabetes   · Radiation Treatment (If YES, please provide more detail): No  · Any other major medical conditions/concerns? (If Yes, which types)- YES, Iron infusion in June 2021    Social History:     What is/was your primary occupation?      What are your hobbies/past-times? Shopping     Family History:  Have any of your "first degree relatives" (parent, brother, sister, or child) had any of the following       · Skin cancer such as Melanoma or Merkel Cell Carcinoma or Pancreatic Cancer? No  · Eczema, Asthma, Hay Fever or Seasonal Allergies: YES, Daughter has hx of asthma   · Psoriasis or Psoriatic Arthritis: No  · Do any other medical conditions seem to run in your family? If Yes, what condition and which relatives?   No    Current Medications: Current Outpatient Medications:     betamethasone dipropionate (DIPROSONE) 0 05 % cream, Apply topically 2 (two) times a day, Disp: 45 g, Rfl: 1    Cyanocobalamin (B-12) 1000 MCG SUBL, PLACE ONE TABLET UNDER THE TONGUE EVERY DAY, Disp: , Rfl:     ergocalciferol (VITAMIN D2) 50,000 units, TAKE 1 CAPSULE BY MOUTH ONE TIME PER WEEK, Disp: , Rfl: 0    ferrous sulfate 325 (65 Fe) mg tablet, TAKE 1 TABLET BY MOUTH TWICE A DAY WITH A MEAL AND VITAMIN C 250 MG, Disp: , Rfl: 3    gatifloxacin (ZYMAXID) 0 5 %, INSTILL 1 DROP INTO RIGHT EYE FOUR TIMES A DAY, Disp: , Rfl:     hydrOXYzine HCL (ATARAX) 10 mg tablet, Take 1 tablet (10 mg total) by mouth every 6 (six) hours as needed for itching, Disp: 60 tablet, Rfl: 2    lamoTRIgine (LaMICtal) 25 mg tablet, TAKE ONE TABLET DAILY FOR 7 DAYS AT BEDTIME FOR 7 DAYS THEN TAKE 2 TABLETS AT BEDTIME FOR 7 DAYS, Disp: , Rfl:     Lice Treatment Creme Rinse 1 % liquid, APPLY TOPICALLY ONE TIME FOR 1 DOSE, Disp: , Rfl:     Multiple Vitamin (MULTI-VITAMIN PO), Take by mouth, Disp: , Rfl:     pantoprazole (PROTONIX) 40 mg tablet, TAKE ONE TABLET BY MOUTH TWICE A DAY, Disp: 60 tablet, Rfl: 0    permethrin (ELIMITE) 5 % cream, APPLY TOPICALLY ONE TIME FOR 1 DOSE, Disp: , Rfl:     ARIPiprazole (ABILIFY) 5 mg tablet, Take 1 tablet (5 mg total) by mouth daily (Patient not taking: Reported on 4/14/2022 ), Disp: 30 tablet, Rfl: 0    clindamycin (CLEOCIN T) 1 % lotion, APPLY TO PIMPLE LIKE RASH ON BODY TWICE DAILY FOR 3 WEEKS UNTIL CLEAR   (Patient not taking: Reported on 4/14/2022), Disp: , Rfl:     clonazePAM (KlonoPIN) 0 5 mg tablet, TAKE ONE TABLET BY MOUTH TWICE A DAY AS NEEDED ANXIETY (Patient not taking: Reported on 4/14/2022), Disp: , Rfl:     [employer]S VITAMIN C 250 MG tablet, TAKE 1 TABLET BY MOUTH TWICE A DAY WITH IRON (Patient not taking: Reported on 4/14/2022), Disp: , Rfl: 11    desonide (DESOWEN) 0 05 % ointment, Apply topically 2 (two) times a day (Patient not taking: Reported on 4/14/2022 ), Disp: 15 g, Rfl: 0    Lyrica 200 MG capsule, Take 1 capsule (200 mg total) by mouth 2 (two) times a day, Disp: 60 capsule, Rfl: 3    mupirocin (BACTROBAN) 2 % ointment, APPLY TO ALL SCABBED AREAS TWICE DAILY UNTIL HEALED ( REFRAIN FROM PICKING SCABS ) (Patient not taking: Reported on 4/14/2022), Disp: , Rfl:     sucralfate (CARAFATE) 1 g/10 mL suspension, TAKE 10ML (1GRAM)  BY MOUTH FOUR TIMES A DAY WITH MEALS AND AT BEDTIME, Disp: 420 mL, Rfl: 3    TobraDex ophthalmic ointment, APPLY SMALL AMOUNT ON EACH EYELID THREE TIMES A DAY FOR 10 DAYS (Patient not taking: Reported on 4/14/2022), Disp: , Rfl:     tobramycin-dexamethasone (TobraDex) ophthalmic ointment, APPLY SMALL AMOUNT ON EACH EYELID THREE TIMES A DAY FOR 10 DAYS (Patient not taking: Reported on 4/14/2022), Disp: , Rfl:     triamcinolone (KENALOG) 0 1 % ointment, APPLY TO AREAS OF RASH ON BODY AND SIDE OF LIPS TWO TIMES A DAY FOR 2 WEEKS, THEN ONLY 2-3 TIMES WEEKLY AS NEEDED (MIX WITH MUPIROCIN) (Patient not taking: Reported on 4/14/2022), Disp: , Rfl:       Review of Systems:  Have you recently had or currently have any of the following? If YES, what are you doing for the problem? · Fever, chills or unintended weight loss: No  · Sudden loss or change in your vision: YES, Changes in vision   · Nausea, vomiting or blood in your stool: No  · Painful or swollen joints: No  · Wheezing or cough: No  · Changing mole or non-healing wound: No  · Nosebleeds: No  · Excessive sweating: No  · Easy or prolonged bleeding? No  · Over the last 2 weeks, how often have you been bothered by the following problems? · Taking little interest or pleasure in doing things: 1 - Not at All  · Feeling down, depressed, or hopeless: 2 - Several days  · Rapid heartbeat with epinephrine:  No    · FEMALES ONLY:    · Are you pregnant or planning to become pregnant? No  · Are you currently or planning to be nursing or breast feeding?  No    · Any known allergies? No Known Allergies      Physical Exam:     Was a chaperone (Derm Clinical Assistant) present throughout the entire Physical Exam? Yes     Did the Dermatology Team specifically  the patient on the importance of a Full Skin Exam to be sure that nothing is missed clinically?  Yes}  o Did the patient ultimately request or accept a Full Skin Exam?  Yes  o Did the patient specifically refuse to have the areas "under-the-bra" examined by the Dermatologist? No  o Did the patient specifically refuse to have the areas "under-the-underwear" examined by the Dermatologist? No    CONSTITUTIONAL:   Vitals:    04/14/22 1510   Temp: 98 °F (36 7 °C)   TempSrc: Temporal   Weight: 62 6 kg (138 lb)   Height: 5' 4" (1 626 m)       PSYCH: Normal mood and affect  EYES: Normal conjunctiva  ENT: Normal lips and oral mucosa  CARDIOVASCULAR: No edema  RESPIRATORY: Normal respirations  HEME/LYMPH/IMMUNO:  No regional lymphadenopathy except as noted below in "ASSESSMENT AND PLAN BY DIAGNOSIS"    SKIN:  FULL ORGAN SYSTEM EXAM  Hair, Scalp, Ears, Face Normal except as noted below in Assessment   Neck, Cervical Chain Nodes Normal except as noted below in Assessment   Right Arm/Hand/Fingers Normal except as noted below in Assessment   Left Arm/Hand/Fingers Normal except as noted below in Assessment   Chest/Breasts/Axillae Viewed areas Normal except as noted below in Assessment   Abdomen, Umbilicus Normal except as noted below in Assessment   Back/Spine Normal except as noted below in Assessment   Groin/Genitalia/Buttocks NOT EXAMINED   Right Leg, Foot, Toes Normal except as noted below in Assessment   Left Leg, Foot, Toes Normal except as noted below in Assessment        Assessment and Plan by Diagnosis:    History of Present Condition:     Duration:  How long has this been an issue for you?    o  Since June 2021, specially at night    Location Affected:  Where on the body is this affecting you?    o  Face, chest, arms, legs, upper back    Quality:  Is there any bleeding, pain, itch, burning/irritation, or redness associated with the skin lesion?    o  Itch, pain, scrawling things on body    Severity:  Describe any bleeding, pain, itch, burning/irritation, or redness on a scale of 1 to 10 (with 10 being the worst)  o  10   Timing:  Does this condition seem to be there pretty constantly or do you notice it more at specific times throughout the day?    o  Constantly    Context:  Have you ever noticed that this condition seems to be associated with specific activities you do?    o  Unknown    Modifying Factors:    o Anything that seems to make the condition worse?    -  Unknown   o What have you tried to do to make the condition better? -  She has tried Bethamesone dipropionate 0 05% cream twice a day, triamcinolone 0 1% ointment, Clindamycin lotion, hydroxyzine for the itching, amoxicillin, mupirocin 2% ointment, tobradex, Doxycyline 100 mg for 3 months, permetrin cream        Associated Signs and Symptoms:  Does this skin lesion seem to be associated with any of the following:  o  SL AMB DERM SIGNS AND SYMPTOMS: Itching and Scratching     RASH  Pruritis   IRON DEFICIENCY     Physical Exam:   (Anatomic Location); (Size and Morphological Description); (Differential Diagnosis):  o Face, arms, hands, upper back   Pertinent Positives:   Pertinent Negatives: Additional History of Present Condition:  Patient presents to the [facility] with concerns regarding itching since last June 2021  Patient had a biopsy performed at dedicated dermatologist which did not show any signs of any infestation  She is on Hydroxyzine 10 mg tablet which helps for the itch, but says she makes her sleepy  She says she has diabetes  She had gastric bypass surgery  She admits she is not following with any doctors at this time       Assessment and Plan:  Based on a thorough discussion of this condition and the management approach to it (including a comprehensive discussion of the known risks, side effects and potential benefits of treatment), the patient (family) agrees to implement the following specific plan:   Discussed this itchiness skin condition is due to her iron levels   Reviewed blood work results in office   Strongly recommended to get the blood work done as soon as possible and we will refer patient to Hematology for evaluation and treatment if results are low again   New blood work ordered - CBC with Diff, Iron panel, Ferritin, CMP, Vitamin D 25 Hydroxy   Follow up as needed  PATIENT CHART IS REVIEWED  CLINICALLY SHE HAS DEEP EXCORIATION OF SKIN WITH SENSATION  OF FORMICATION  IT IS WELL KNOWN THAT SIGNIFICANT IRON DEFICIENCY CAN CAUSE SEVERE ITHCHING  I DISCUSSED WITH HER THAT HER PRIOR HISORY OF IRON DEFIENCY NEEDS TO BE ADDRESSED TO HELP WITH HER SYMPTOMATOLOGY      Scribe Attestation    I,:  Ann-Marie Jane MD am acting as a scribe while in the presence of the attending physician :       I,:  Jero Yuan personally performed the services described in this documentation    as scribed in my presence :

## 2022-04-15 ENCOUNTER — TELEPHONE (OUTPATIENT)
Dept: ADMINISTRATIVE | Facility: OTHER | Age: 37
End: 2022-04-15

## 2022-04-15 NOTE — TELEPHONE ENCOUNTER
Upon review of the In Basket request we were able to locate, review, and update the patient chart as requested for Urine Microalbumin  Any additional questions or concerns should be emailed to the Practice Liaisons via [email or web address] email, please do not reply via In Basket      Thank you  Ova Stack

## 2022-04-15 NOTE — TELEPHONE ENCOUNTER
----- Message from Higinio Mota, [address] [place] sent at 4/14/2022 11:46 AM EDT -----  Regarding: urine micro  04/14/22 11:46 AM    Hello, our patient attached above has had Urine Microalbumin completed/performed  Please assist in updating the patient chart by pulling the Care Everywhere (CE) document  The date of service is 10/26/19       Thank you,  Higinio Mota MA  [facility]errero

## 2022-04-17 ENCOUNTER — HOSPITAL ENCOUNTER (EMERGENCY)
Facility: HOSPITAL | Age: 37
Discharge: STILL ADMITTED OR ADMINISTRATIVE | End: 2022-04-20
Attending: EMERGENCY MEDICINE
Payer: COMMERCIAL

## 2022-04-17 DIAGNOSIS — R45.851 SUICIDAL IDEATION: Primary | ICD-10-CM

## 2022-04-17 LAB
AMPHETAMINES SERPL QL SCN: NEGATIVE
ANION GAP SERPL CALCULATED.3IONS-SCNC: 7 MMOL/L (ref 4–13)
BARBITURATES UR QL: NEGATIVE
BASOPHILS # BLD AUTO: 0.07 THOUSANDS/ΜL (ref 0–0.1)
BASOPHILS NFR BLD AUTO: 1 % (ref 0–1)
BENZODIAZ UR QL: NEGATIVE
BUN SERPL-MCNC: 10 MG/DL (ref 5–25)
CALCIUM SERPL-MCNC: 8.9 MG/DL (ref 8.3–10.1)
CHLORIDE SERPL-SCNC: 106 MMOL/L (ref 100–108)
CO2 SERPL-SCNC: 27 MMOL/L (ref 21–32)
COCAINE UR QL: NEGATIVE
CREAT SERPL-MCNC: 0.6 MG/DL (ref 0.6–1.3)
EOSINOPHIL # BLD AUTO: 0.17 THOUSAND/ΜL (ref 0–0.61)
EOSINOPHIL NFR BLD AUTO: 2 % (ref 0–6)
ERYTHROCYTE [DISTWIDTH] IN BLOOD BY AUTOMATED COUNT: 15.2 % (ref 11.6–15.1)
ETHANOL EXG-MCNC: 0 MG/DL
EXT PREG TEST URINE: NEGATIVE
EXT. CONTROL ED NAV: NORMAL
FLUAV RNA RESP QL NAA+PROBE: NEGATIVE
FLUBV RNA RESP QL NAA+PROBE: NEGATIVE
GFR SERPL CREATININE-BSD FRML MDRD: 116 ML/MIN/1.73SQ M
GLUCOSE SERPL-MCNC: 99 MG/DL (ref 65–140)
HCT VFR BLD AUTO: 40.6 % (ref 34.8–46.1)
HGB BLD-MCNC: 12.9 G/DL (ref 11.5–15.4)
IMM GRANULOCYTES # BLD AUTO: 0.01 THOUSAND/UL (ref 0–0.2)
IMM GRANULOCYTES NFR BLD AUTO: 0 % (ref 0–2)
LYMPHOCYTES # BLD AUTO: 3.42 THOUSANDS/ΜL (ref 0.6–4.47)
LYMPHOCYTES NFR BLD AUTO: 39 % (ref 14–44)
MCH RBC QN AUTO: 27.2 PG (ref 26.8–34.3)
MCHC RBC AUTO-ENTMCNC: 31.8 G/DL (ref 31.4–37.4)
MCV RBC AUTO: 86 FL (ref 82–98)
METHADONE UR QL: NEGATIVE
MONOCYTES # BLD AUTO: 0.64 THOUSAND/ΜL (ref 0.17–1.22)
MONOCYTES NFR BLD AUTO: 7 % (ref 4–12)
NEUTROPHILS # BLD AUTO: 4.48 THOUSANDS/ΜL (ref 1.85–7.62)
NEUTS SEG NFR BLD AUTO: 51 % (ref 43–75)
NRBC BLD AUTO-RTO: 0 /100 WBCS
OPIATES UR QL SCN: NEGATIVE
OXYCODONE+OXYMORPHONE UR QL SCN: NEGATIVE
PCP UR QL: NEGATIVE
PLATELET # BLD AUTO: 376 THOUSANDS/UL (ref 149–390)
PMV BLD AUTO: 10.2 FL (ref 8.9–12.7)
POTASSIUM SERPL-SCNC: 3.7 MMOL/L (ref 3.5–5.3)
RBC # BLD AUTO: 4.75 MILLION/UL (ref 3.81–5.12)
RSV RNA RESP QL NAA+PROBE: NEGATIVE
SARS-COV-2 RNA RESP QL NAA+PROBE: NEGATIVE
SODIUM SERPL-SCNC: 140 MMOL/L (ref 136–145)
THC UR QL: NEGATIVE
WBC # BLD AUTO: 8.79 THOUSAND/UL (ref 4.31–10.16)

## 2022-04-17 PROCEDURE — 85025 COMPLETE CBC W/AUTO DIFF WBC: CPT | Performed by: EMERGENCY MEDICINE

## 2022-04-17 PROCEDURE — 80048 BASIC METABOLIC PNL TOTAL CA: CPT | Performed by: EMERGENCY MEDICINE

## 2022-04-17 PROCEDURE — 36415 COLL VENOUS BLD VENIPUNCTURE: CPT | Performed by: EMERGENCY MEDICINE

## 2022-04-17 PROCEDURE — 99285 EMERGENCY DEPT VISIT HI MDM: CPT

## 2022-04-17 PROCEDURE — 80307 DRUG TEST PRSMV CHEM ANLYZR: CPT | Performed by: EMERGENCY MEDICINE

## 2022-04-17 PROCEDURE — 0241U HB NFCT DS VIR RESP RNA 4 TRGT: CPT | Performed by: EMERGENCY MEDICINE

## 2022-04-17 PROCEDURE — 81025 URINE PREGNANCY TEST: CPT | Performed by: EMERGENCY MEDICINE

## 2022-04-17 PROCEDURE — 82075 ASSAY OF BREATH ETHANOL: CPT | Performed by: EMERGENCY MEDICINE

## 2022-04-17 RX ORDER — LORAZEPAM 1 MG/1
2 TABLET ORAL ONCE
Status: COMPLETED | OUTPATIENT
Start: 2022-04-18 | End: 2022-04-18

## 2022-04-17 NOTE — LETTER
[address] [facility] [address]  [address] [address] [facility] [address]  Dept: [phone]      OSWLXA TRANSFER CONSENT    NAME Gaylene Severs                                         1985                              MRN [id number]    I have been informed of my rights regarding examination, treatment, and transfer   by Dr Brooke Logan DO    Benefits: Continuity of care    Risks: Potential for delay in receiving treatment      Consent for Transfer:  I acknowledge that my medical condition has been evaluated and explained to me by the emergency department physician or other qualified medical person and/or my attending physician, who has recommended that I be transferred to the service of  Accepting Physician: Dr Lou Sanchez at [address] Name, [address] : [facility]  The above potential benefits of such transfer, the potential risks associated with such transfer, and the probable risks of not being transferred have been explained to me, and I fully understand them  The doctor has explained that, in my case, the benefits of transfer outweigh the risks  I agree to be transferred  I authorize the performance of emergency medical procedures and treatments upon me in both transit and upon arrival at the receiving facility  Additionally, I authorize the release of any and all medical records to the receiving facility and request they be transported with me, if possible  I understand that the safest mode of transportation during a medical emergency is an ambulance and that the Hospital advocates the use of this mode of transport  Risks of traveling to the receiving facility by car, including absence of medical control, life sustaining equipment, such as oxygen, and medical personnel has been explained to me and I fully understand them      (STEPHEN CORRECT BOX BELOW)  [  ]  I consent to the stated transfer and to be transported by ambulance/helicopter  [  ]  I consent to the stated transfer, but refuse transportation by ambulance and accept full responsibility for my transportation by car  I understand the risks of non-ambulance transfers and I exonerate the Hospital and its staff from any deterioration in my condition that results from this refusal     X___________________________________________    DATE  22  TIME________  Signature of patient or legally responsible individual signing on patient behalf           RELATIONSHIP TO PATIENT_________________________          Provider Certification    NAME Jose AGARWAL 1985                              MRN [id number]    A medical screening exam was performed on the above named patient  Based on the examination:    Condition Necessitating Transfer There were no encounter diagnoses  Patient Condition: The patient has been stabilized such that within reasonable medical probability, no material deterioration of the patient condition or the condition of the unborn child(haleigh) is likely to result from the transfer    Reason for Transfer: Level of Care needed not available at this facility    Transfer Requirements: [address]   · Space available and qualified personnel available for treatment as acknowledged by [employer] [phone]  · Agreed to accept transfer and to provide appropriate medical treatment as acknowledged by       Dr Quinton Gonsalves  · Appropriate medical records of the examination and treatment of the patient are provided at the time of transfer   [address] [facility] [address] _______  · Transfer will be performed by qualified personnel from [address] [place] [address]  and appropriate transfer equipment as required, including the use of necessary and appropriate life support measures      Provider Certification: I have examined the patient and explained the following risks and benefits of being transferred/refusing transfer to the patient/family:  General risk, such as traffic hazards, adverse weather conditions, rough terrain or turbulence, possible failure of equipment (including vehicle or aircraft), or consequences of actions of persons outside the control of the transport personnel,The patient is stable for psychiatric transfer because they are medically stable, and is protected from harming him/herself or others during transport      Based on these reasonable risks and benefits to the patient and/or the unborn child(haleigh), and based upon the information available at the time of the patients examination, I certify that the medical benefits reasonably to be expected from the provision of appropriate medical treatments at another medical facility outweigh the increasing risks, if any, to the individuals medical condition, and in the case of labor to the unborn child, from effecting the transfer      X____________________________________________ DATE 04/20/22        TIME_______      ORIGINAL - SEND TO MEDICAL RECORDS   COPY - SEND WITH PATIENT DURING TRANSFER

## 2022-04-18 LAB
FLUAV RNA RESP QL NAA+PROBE: NEGATIVE
FLUBV RNA RESP QL NAA+PROBE: NEGATIVE
RSV RNA RESP QL NAA+PROBE: NEGATIVE
SARS-COV-2 RNA RESP QL NAA+PROBE: NEGATIVE

## 2022-04-18 PROCEDURE — 0241U HB NFCT DS VIR RESP RNA 4 TRGT: CPT | Performed by: EMERGENCY MEDICINE

## 2022-04-18 PROCEDURE — 99285 EMERGENCY DEPT VISIT HI MDM: CPT | Performed by: EMERGENCY MEDICINE

## 2022-04-18 RX ORDER — HALOPERIDOL 1 MG/1
5 TABLET ORAL ONCE
Status: COMPLETED | OUTPATIENT
Start: 2022-04-18 | End: 2022-04-18

## 2022-04-18 RX ORDER — LORAZEPAM 1 MG/1
1 TABLET ORAL ONCE
Status: COMPLETED | OUTPATIENT
Start: 2022-04-18 | End: 2022-04-18

## 2022-04-18 RX ORDER — LORAZEPAM 1 MG/1
2 TABLET ORAL ONCE
Status: COMPLETED | OUTPATIENT
Start: 2022-04-18 | End: 2022-04-18

## 2022-04-18 RX ORDER — PREGABALIN 100 MG/1
100 CAPSULE ORAL 2 TIMES DAILY
Status: DISCONTINUED | OUTPATIENT
Start: 2022-04-18 | End: 2022-04-20 | Stop reason: HOSPADM

## 2022-04-18 RX ORDER — HYDROXYZINE HYDROCHLORIDE 25 MG/1
12.5 TABLET, FILM COATED ORAL EVERY 6 HOURS PRN
Status: DISCONTINUED | OUTPATIENT
Start: 2022-04-18 | End: 2022-04-20 | Stop reason: HOSPADM

## 2022-04-18 RX ORDER — LAMOTRIGINE 25 MG/1
25 TABLET ORAL DAILY
Status: DISCONTINUED | OUTPATIENT
Start: 2022-04-19 | End: 2022-04-20 | Stop reason: HOSPADM

## 2022-04-18 RX ADMIN — LORAZEPAM 1 MG: 1 TABLET ORAL at 11:48

## 2022-04-18 RX ADMIN — LORAZEPAM 2 MG: 1 TABLET ORAL at 00:06

## 2022-04-18 RX ADMIN — LORAZEPAM 2 MG: 1 TABLET ORAL at 20:09

## 2022-04-18 RX ADMIN — HALOPERIDOL 5 MG: 1 TABLET ORAL at 17:02

## 2022-04-18 RX ADMIN — PREGABALIN 100 MG: 100 CAPSULE ORAL at 20:09

## 2022-04-18 NOTE — ED NOTES
Patient is accepted at [facility]  Patient is accepted by Dr Cecilio Molina per Sean/Admission  Transportation is arranged with **     Transportation is scheduled for **  Patient may go to the floor tomorrow  Nurse report is to be called to [phone] prior to patient transfer

## 2022-04-18 NOTE — ED NOTES
Adult Bed Search:     [place]- spoke with Luis Fernando Parry, no beds     [facility]- spoke with Jessica Sheth, no beds     [place]-spoke with Chi Haines, no beds     [facility]spoke with Yuridia Ardon, no beds      [facility] beds   [facility]- called 3xs no answer, inpatient unit RN took message for call back       [place]-spoke with Amber Kimbrough, no beds      [facility]- spoke with Patrica, no beds      [facility]-spoke with Dwayne Salas, no beds      [facility]-no answer, left message requesting call back     [facility]- spoke with Addy Mayer , no beds     [facility]-spoke with Luma , no beds     [facility]-spoke with Bianca, no beds     [place]- spoke with Terence, no beds available     [place]- no answer, called multiple times     [place]- no answer, called multiple times
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
## 2022-04-18 NOTE — ED NOTES
Insurance Authorization for admission:   Phone call placed to [employer]  Phone number: [phone]  Spoke to Dari Cox      ** days approved  Level of care: 201  Review on **  Authorization # **         Crisis was inform none of the Clinical staff available  They will call for clinical information tomorrow morning  Case # [id number] Phone number provided      [facility] / Judy Childers - spoke to [facility]

## 2022-04-18 NOTE — ED NOTES
Spoke with Pawel Tenorio at [facility] to confirm that clinical was received      Brady Javier LMSW  04/18/22  6922

## 2022-04-18 NOTE — ED NOTES
Call placed to [facility], spoke with Nasreen Lema, who requested clinical be sent       Puma Beltre LMSW  04/18/22  0660

## 2022-04-18 NOTE — ED NOTES
Patient's friend, Katherine, called for an update  Katherine was made aware that at bed search is still in progress      Otf De La Fuente LMSW  04/18/22  1014

## 2022-04-18 NOTE — ED NOTES
[facility] admission/Ralp requested to fax completed 201, faxed   Also, new Covid test, request send to the attending Dr Bernard

## 2022-04-19 LAB — GLUCOSE SERPL-MCNC: 204 MG/DL (ref 65–140)

## 2022-04-19 PROCEDURE — 82948 REAGENT STRIP/BLOOD GLUCOSE: CPT

## 2022-04-19 RX ADMIN — PREGABALIN 100 MG: 100 CAPSULE ORAL at 09:52

## 2022-04-19 RX ADMIN — HYDROXYZINE HYDROCHLORIDE 12.5 MG: 25 TABLET ORAL at 23:15

## 2022-04-19 RX ADMIN — PREGABALIN 100 MG: 100 CAPSULE ORAL at 17:43

## 2022-04-19 RX ADMIN — LAMOTRIGINE 25 MG: 25 TABLET ORAL at 09:52

## 2022-04-19 NOTE — ED CARE HANDOFF
Emergency Department Sign Out Note        Sign out and transfer of care from Dr Monty Gillis  See Separate Emergency Department note  The patient, Oz Ceja, was evaluated by the previous provider for suicidal ideation  Workup Completed:  Patient medically cleared for inpatient psychiatric admission, 201 signed, awaiting appropriate placement  ED Course / Workup Pending (followup): No significant events throughout remainder shift, hemodynamically stable and comfortable at time of sign out to Dr Lula Chang at time of shift change  Procedures  MDM        Disposition  Final diagnoses:   None     ED Disposition     None      MD Documentation      Most Recent Value   Sending MD Sahil Mcmahon MD      Follow-up Information    None       Patient's Medications   Discharge Prescriptions    No medications on file     No discharge procedures on file         ED Provider  Electronically Signed by     Nicole Watson MD  04/20/22 6016

## 2022-04-19 NOTE — ED NOTES
CW received call from [employer] of [facility]IC[facility]  [employer] inquiring if pt will be present at court hearing later today as children are in foster care at this time  CW advised Maureen, pt will be receiving IP tx at this time      ERIN, CW

## 2022-04-19 NOTE — NURSING NOTE
Patient c/o dry mouth and weakness, and stated that she is diabetic  POC BS checked was 204  Provider notified

## 2022-04-19 NOTE — ED NOTES
CW placed call to [facility], spoke w/[place]  CW provided [employer]  As per Lobito Mccrary, admissions will return call w/time for pt to arrive and permission to set up transport       TDS, CW

## 2022-04-19 NOTE — ED CARE HANDOFF
Patient re-evaluated and resting comfortably in bed  She signed a 201 and is awaiting placement  Case signed out to Dr Johnny May  Patient stable at the time of sign out        Radha Allen DO  04/19/22 2079

## 2022-04-19 NOTE — ED NOTES
CW received return call from Stephanie De Dios of [address] for admission:   Phone call placed to [facility] BS  Phone number: [phone]     Spoke to Stephanie De Dios     3 days approved  Level of care: 201  Review on 4/21/2022 - UR person to place call to [facility] on 4/21/2022, [phone], Option 3  Authorization # [id number]        EVS (Eligibility Verification System) [facility] - [phone]  Automated system indicates: **    Insurance Authorization for Transportation:    Phone call placed to **  Phone number **  Spoke to **     Authorization #: **    RODNEYS, TOAN

## 2022-04-19 NOTE — ED NOTES
CW placed call to [facility], spoke w/[place]  CW inquiring when pt may arrive  Triny Briones advise, [place]'s supervisor will return call once available w/KEN STEVENSS, CW

## 2022-04-20 VITALS
RESPIRATION RATE: 18 BRPM | SYSTOLIC BLOOD PRESSURE: 121 MMHG | DIASTOLIC BLOOD PRESSURE: 58 MMHG | TEMPERATURE: 98.4 F | OXYGEN SATURATION: 99 % | HEART RATE: 78 BPM

## 2022-04-20 RX ADMIN — LAMOTRIGINE 25 MG: 25 TABLET ORAL at 08:18

## 2022-04-20 RX ADMIN — PREGABALIN 100 MG: 100 CAPSULE ORAL at 08:18

## 2022-04-20 NOTE — ED NOTES
CW placed call to [facility], spoke w/admissions, CW inquired on setting up transport once again  As per admissions, will return call      TDS, CW

## 2022-04-20 NOTE — ED NOTES
Patient belongings identified by patient upon transfer  Everything was there        Juan Bonner  04/20/22 1014

## 2022-04-20 NOTE — ED NOTES
Crisis received a call from [place] that transport can be arranged  Crisis contacted [facility] and requested transport for ASAP

## 2022-05-05 ENCOUNTER — PREP FOR PROCEDURE (OUTPATIENT)
Dept: GASTROENTEROLOGY | Facility: CLINIC | Age: 37
End: 2022-05-05

## 2022-05-05 ENCOUNTER — OFFICE VISIT (OUTPATIENT)
Dept: GASTROENTEROLOGY | Facility: CLINIC | Age: 37
End: 2022-05-05
Payer: COMMERCIAL

## 2022-05-05 ENCOUNTER — TELEPHONE (OUTPATIENT)
Dept: GASTROENTEROLOGY | Facility: CLINIC | Age: 37
End: 2022-05-05

## 2022-05-05 VITALS
HEIGHT: 64 IN | DIASTOLIC BLOOD PRESSURE: 76 MMHG | HEART RATE: 96 BPM | BODY MASS INDEX: 24.07 KG/M2 | SYSTOLIC BLOOD PRESSURE: 138 MMHG | WEIGHT: 141 LBS

## 2022-05-05 DIAGNOSIS — R10.33 PERIUMBILICAL PAIN: ICD-10-CM

## 2022-05-05 DIAGNOSIS — R11.14 BILIOUS VOMITING WITH NAUSEA: ICD-10-CM

## 2022-05-05 DIAGNOSIS — K59.00 CONSTIPATION, UNSPECIFIED CONSTIPATION TYPE: ICD-10-CM

## 2022-05-05 DIAGNOSIS — R11.2 NAUSEA AND VOMITING: ICD-10-CM

## 2022-05-05 DIAGNOSIS — K21.9 GASTROESOPHAGEAL REFLUX DISEASE, UNSPECIFIED WHETHER ESOPHAGITIS PRESENT: Primary | ICD-10-CM

## 2022-05-05 PROCEDURE — 99213 OFFICE O/P EST LOW 20 MIN: CPT | Performed by: PHYSICIAN ASSISTANT

## 2022-05-05 PROCEDURE — 3008F BODY MASS INDEX DOCD: CPT | Performed by: PHYSICIAN ASSISTANT

## 2022-05-05 PROCEDURE — 1036F TOBACCO NON-USER: CPT | Performed by: PHYSICIAN ASSISTANT

## 2022-05-05 RX ORDER — DOXEPIN HYDROCHLORIDE 50 MG/1
CAPSULE ORAL
COMMUNITY
Start: 2022-04-27

## 2022-05-05 RX ORDER — BUSPIRONE HYDROCHLORIDE 5 MG/1
TABLET ORAL
COMMUNITY
Start: 2022-04-27

## 2022-05-05 RX ORDER — LINACLOTIDE 72 UG/1
72 CAPSULE, GELATIN COATED ORAL DAILY
Qty: 90 CAPSULE | Refills: 3 | Status: SHIPPED | OUTPATIENT
Start: 2022-05-05 | End: 2022-07-25

## 2022-05-05 RX ORDER — LAMOTRIGINE 100 MG/1
100 TABLET ORAL
COMMUNITY
Start: 2022-04-14

## 2022-05-05 RX ORDER — VILAZODONE HYDROCHLORIDE 20 MG/1
20 TABLET ORAL
COMMUNITY
Start: 2022-04-14 | End: 2022-06-03 | Stop reason: ALTCHOICE

## 2022-05-05 RX ORDER — PANTOPRAZOLE SODIUM 40 MG/1
40 TABLET, DELAYED RELEASE ORAL 2 TIMES DAILY
Qty: 60 TABLET | Refills: 0 | Status: SHIPPED | OUTPATIENT
Start: 2022-05-05 | End: 2022-08-09 | Stop reason: SDUPTHER

## 2022-05-05 RX ORDER — ALPRAZOLAM 0.5 MG/1
TABLET ORAL
COMMUNITY
Start: 2022-04-27

## 2022-05-05 NOTE — TELEPHONE ENCOUNTER
Was advised pt needs auth for [address] [place] for insurance info:   2022  Mariana [id number]  [facility]  [id number]  ID [id number]

## 2022-05-05 NOTE — PROGRESS NOTES
[facility] - Outpatient Consultation  Oz Ceja 43 y o  female MRN: [id number]  Encounter: [id number]          ASSESSMENT AND PLAN:      1  Gastroesophageal reflux disease, unspecified whether esophagitis present  2  Bilious vomiting with nausea  3  Periumbilical pain  4  Nausea and vomiting  She has a very long history of heartburn  She is s/p Lap band then gastric sleeve then RNY bypass in 2018  She will restart Pantoprazole  She notes episode of hematemesis and increased epigastric pain  Will plan EGD to r/o a marginal ulcer  We reviewed the necessity of following a bariatric diet      5  Constipation, unspecified constipation type  She never got linzess due to insurance issues   Will attempt prior authorization    ______________________________________________________________________    HPI:  75-year-old female with a history of a Jb-en-Y bypass 2018 and a long history of acid reflux presents for evaluation of exacerbation of symptoms  She initially had a lap band which was followed by a gastric sleeve  Unfortunately after the sleeve she had a significant increase in her heartburn symptoms and so she was converted to a Jb-en-Y bypass in 2018  Since that time she has continued to suffer with reflux but admits that has not been as severe  She was doing okay on pantoprazole however ran out of it recently and has been on able to get it refilled  She reports episodes of what she thinks may have been hematemesis  She reports associated she reports associated abdominal pain  She notes that this a new symptom for her  She reports ongoing issues with constipation  She was prescribed Linzess at her appointment last year but was unable to get it filled due to insurance issues  Her last EGD was in February of 2021 which was reported as normal exam       REVIEW OF SYSTEMS:    CONSTITUTIONAL: Denies any fever, chills, rigors, and weight loss  HEENT: No earache or tinnitus  Denies hearing loss or visual disturbances  CARDIOVASCULAR: No chest pain or palpitations  RESPIRATORY: Denies any cough, hemoptysis, shortness of breath or dyspnea on exertion  GASTROINTESTINAL: As noted in the History of Present Illness  GENITOURINARY: No problems with urination  Denies any hematuria or dysuria  NEUROLOGIC: No dizziness or vertigo, denies headaches  MUSCULOSKELETAL: Denies any muscle or joint pain  SKIN: Denies skin rashes or itching  ENDOCRINE: Denies excessive thirst  Denies intolerance to heat or cold  PSYCHOSOCIAL: Denies depression or anxiety  Denies any recent memory loss         Historical Information   Past Medical History:   Diagnosis Date    Anemia     iron def    Chronic pain disorder     feet    Depression     Diabetes mellitus (HCC)     neg since gastric bypass    GERD (gastroesophageal reflux disease)     Gilbert's syndrome     Neuropathy     feet sheila    Vitamin D deficiency      Past Surgical History:   Procedure Laterality Date     SECTION      X3    CHOLECYSTECTOMY      GASTRIC BYPASS      HERNIA REPAIR      [place] EXCISE EXCESS SKIN TISSUE,ABDOMEN N/A 2020    Procedure: ABDOMINALPLASTY,CIRCUMFERENTIAL;  Surgeon: Anne Moore MD;  Location:  MAIN OR;  Service: Plastics     Social History   Social History     Substance and Sexual Activity   Alcohol Use Not Currently    Alcohol/week: 0 0 standard drinks    Comment: once a month     Social History     Substance and Sexual Activity   Drug Use Never     Social History     Tobacco Use   Smoking Status Former Smoker    Packs/day: 0 50    Years: 5 00    Pack years: 2 50    Types: Cigarettes   Smokeless Tobacco Never Used     Family History   Problem Relation Age of Onset    Mental illness Mother     Drug abuse Mother        Meds/Allergies       Current Outpatient Medications:     ALPRAZolam (XANAX) 0 5 mg tablet    betamethasone dipropionate (DIPROSONE) 0 05 % cream    busPIRone (BUSPAR) 5 mg tablet    Cyanocobalamin (B-12) 1000 MCG SUBL    doxepin (SINEquan) 50 mg capsule    ergocalciferol (VITAMIN D2) 50,000 units    ferrous sulfate 325 (65 Fe) mg tablet    gatifloxacin (ZYMAXID) 0 5 %    hydrOXYzine HCL (ATARAX) 10 mg tablet    lamoTRIgine (LaMICtal) 100 mg tablet    lamoTRIgine (LaMICtal) 25 mg tablet    Multiple Vitamin (MULTI-VITAMIN PO)    pantoprazole (PROTONIX) 40 mg tablet    Viibryd 20 MG tablet    linaCLOtide (Linzess) 72 MCG CAPS    Lyrica 200 MG capsule    No Known Allergies        Objective     Blood pressure 138/76, pulse 96, height 5' 4" (1 626 m), weight 64 kg (141 lb)  Body mass index is 24 2 kg/m²  PHYSICAL EXAM:      General Appearance:   Alert, cooperative, no distress   HEENT:   Normocephalic, atraumatic, anicteric      Neck:  Supple, symmetrical, trachea midline   Lungs:   Clear to auscultation bilaterally; no rales, rhonchi or wheezing; respirations unlabored    Heart[de-identified]   Regular rate and rhythm; no murmur, rub, or gallop  Abdomen:   Soft, non-tender, non-distended; normal bowel sounds; no masses, no organomegaly    Genitalia:   Deferred    Rectal:   Deferred    Extremities:  No cyanosis, clubbing or edema    Pulses:  2+ and symmetric    Skin:  No jaundice, rashes, or lesions    Lymph nodes:  No palpable cervical lymphadenopathy        Lab Results:   No visits with results within 1 Day(s) from this visit     Latest known visit with results is:   Admission on 04/17/2022, Discharged on 04/20/2022   Component Date Value    EXTBreath Alcohol 04/17/2022 0 000     SARS-CoV-2 04/17/2022 Negative     INFLUENZA A PCR 04/17/2022 Negative     INFLUENZA B PCR 04/17/2022 Negative     RSV PCR 04/17/2022 Negative     EXT PREG TEST UR (Ref: N* 04/17/2022 Negative     Control 04/17/2022 Valid     Amph/Meth UR 04/17/2022 Negative     Barbiturate Ur 04/17/2022 Negative     Benzodiazepine Urine 04/17/2022 Negative     Cocaine Urine 04/17/2022 Negative     Methadone Urine 04/17/2022 Negative     Opiate Urine 04/17/2022 Negative     PCP Ur 04/17/2022 Negative     THC Urine 04/17/2022 Negative     Oxycodone Urine 04/17/2022 Negative     WBC 04/17/2022 8 79     RBC 04/17/2022 4 75     Hemoglobin 04/17/2022 12 9     Hematocrit 04/17/2022 40 6     MCV 04/17/2022 86     MCH 04/17/2022 27 2     MCHC 04/17/2022 31 8     RDW 04/17/2022 15 2*    MPV 04/17/2022 10 2     Platelets 78/91/1334 376     nRBC 04/17/2022 0     Neutrophils Relative 04/17/2022 51     Immat GRANS % 04/17/2022 0     Lymphocytes Relative 04/17/2022 39     Monocytes Relative 04/17/2022 7     Eosinophils Relative 04/17/2022 2     Basophils Relative 04/17/2022 1     Neutrophils Absolute 04/17/2022 4 48     Immature Grans Absolute 04/17/2022 0 01     Lymphocytes Absolute 04/17/2022 3 42     Monocytes Absolute 04/17/2022 0 64     Eosinophils Absolute 04/17/2022 0 17     Basophils Absolute 04/17/2022 0 07     Sodium 04/17/2022 140     Potassium 04/17/2022 3 7     Chloride 04/17/2022 106     CO2 04/17/2022 27     ANION GAP 04/17/2022 7     BUN 04/17/2022 10     Creatinine 04/17/2022 0 60     Glucose 04/17/2022 99     Calcium 04/17/2022 8 9     eGFR 04/17/2022 116     SARS-CoV-2 04/18/2022 Negative     INFLUENZA A PCR 04/18/2022 Negative     INFLUENZA B PCR 04/18/2022 Negative     RSV PCR 04/18/2022 Negative     POC Glucose 04/19/2022 204*         Radiology Results:   No results found

## 2022-05-12 ENCOUNTER — TELEPHONE (OUTPATIENT)
Dept: INTERNAL MEDICINE CLINIC | Facility: CLINIC | Age: 37
End: 2022-05-12

## 2022-05-12 NOTE — TELEPHONE ENCOUNTER
I called pt b/c we reicved an appt message from her,  And she doesn't want an appt, she wants a referal to infectious disease for an ongoing bite/skin issue,   Says she has seen the dr before and dermatology already  I see we had already put a referla in to infectious disease at some point?        Can we do it again I presume?   ( it says denied)

## 2022-05-12 NOTE — TELEPHONE ENCOUNTER
Please review below as to why referral to ID was denied  "Pt was referred by ED  Office policy is that pt needs to follow up first with PCP and get appropriate work up done before hand   If there is then evidence to support the infestation and no healing after treatment pt case can be reevaluated and if appt is needed can be scheduled "

## 2022-05-18 DIAGNOSIS — E13.42 DIABETIC POLYNEUROPATHY ASSOCIATED WITH OTHER SPECIFIED DIABETES MELLITUS (HCC): ICD-10-CM

## 2022-05-19 ENCOUNTER — TELEPHONE (OUTPATIENT)
Dept: INFECTIOUS DISEASES | Facility: CLINIC | Age: 37
End: 2022-05-19

## 2022-05-19 NOTE — TELEPHONE ENCOUNTER
Patient calls requesting appointment for these bites that she has been having over a year  She has gone to Dermatology who has provided treatment courses that have provided no relief  Patient is c/o itchiness, numb fingers, sensation lost in fingers  She feels like nobody is taking her seriously and she very much wants an appointment  States she has been dealing with this for approx one year  [place] has reviewed this case approximately one month ago and the referral was closed d/t being an ED referral  We now have a referral in the system from patient's PCP      CB: [phone]

## 2022-06-03 ENCOUNTER — OFFICE VISIT (OUTPATIENT)
Dept: INTERNAL MEDICINE CLINIC | Facility: CLINIC | Age: 37
End: 2022-06-03
Payer: COMMERCIAL

## 2022-06-03 VITALS
TEMPERATURE: 98 F | WEIGHT: 138 LBS | BODY MASS INDEX: 23.56 KG/M2 | OXYGEN SATURATION: 99 % | SYSTOLIC BLOOD PRESSURE: 120 MMHG | HEART RATE: 98 BPM | DIASTOLIC BLOOD PRESSURE: 70 MMHG | RESPIRATION RATE: 16 BRPM | HEIGHT: 64 IN

## 2022-06-03 DIAGNOSIS — R21 RASH AND NONSPECIFIC SKIN ERUPTION: Primary | ICD-10-CM

## 2022-06-03 PROCEDURE — 99213 OFFICE O/P EST LOW 20 MIN: CPT | Performed by: INTERNAL MEDICINE

## 2022-06-03 PROCEDURE — 1036F TOBACCO NON-USER: CPT | Performed by: INTERNAL MEDICINE

## 2022-06-03 NOTE — PROGRESS NOTES
Assessment/Plan:     Ham Horvath is here with c/o ongoing rash and bug bites  There are multiple excoriations on her face and extremities  States it is r/t termite infestation  Biopsy done and negative  She has started using bleach and Lysol to wash her face because it feels good  Discussed with pt that there are no termites on her face  She wants a rx for ivermectin and flagyl which I refused to give  Discussed following up with dermatology and ID  She refused and left suddenly- yelling that she will not return  Quality Measures:       Return for Next scheduled follow up  No problem-specific Assessment & Plan notes found for this encounter  Diagnoses and all orders for this visit:    Rash and nonspecific skin eruption    Other orders  -     Cancel: POCT urine microalbumin/creatinine          Subjective:      Patient ID: Fransico Bethea is a 43 y o  female  Pt is here with c/o bug bites        ALLERGIES:  No Known Allergies    CURRENT MEDICATIONS:    Current Outpatient Medications:     ALPRAZolam (XANAX) 0 5 mg tablet, TAKE ONE TABLET BY MOUTH THREE TIMES A DAY ( 2 WEEK SUPPLY ), Disp: , Rfl:     betamethasone dipropionate (DIPROSONE) 0 05 % cream, Apply topically 2 (two) times a day, Disp: 45 g, Rfl: 1    busPIRone (BUSPAR) 5 mg tablet, TAKE ONE TABLET BY MOUTH THREE TIMES A DAY ( 2 WEEK SUPPLY ), Disp: , Rfl:     Cyanocobalamin (B-12) 1000 MCG SUBL, PLACE ONE TABLET UNDER THE TONGUE EVERY DAY, Disp: , Rfl:     doxepin (SINEquan) 50 mg capsule, TAKE ONE CAPSULE BY MOUTH AT BEDTIME ( 2 WEEK SUPPLY ), Disp: , Rfl:     ergocalciferol (VITAMIN D2) 50,000 units, TAKE 1 CAPSULE BY MOUTH ONE TIME PER WEEK, Disp: , Rfl: 0    ferrous sulfate 325 (65 Fe) mg tablet, TAKE 1 TABLET BY MOUTH TWICE A DAY WITH A MEAL AND VITAMIN C 250 MG, Disp: , Rfl: 3    gatifloxacin (ZYMAXID) 0 5 %, INSTILL 1 DROP INTO RIGHT EYE FOUR TIMES A DAY, Disp: , Rfl:     hydrOXYzine HCL (ATARAX) 10 mg tablet, Take 1 tablet (10 mg total) by mouth every 6 (six) hours as needed for itching, Disp: 60 tablet, Rfl: 2    lamoTRIgine (LaMICtal) 100 mg tablet, Take 100 mg by mouth daily at bedtime, Disp: , Rfl:     lamoTRIgine (LaMICtal) 25 mg tablet, TAKE ONE TABLET DAILY FOR 7 DAYS AT BEDTIME FOR 7 DAYS THEN TAKE 2 TABLETS AT BEDTIME FOR 7 DAYS, Disp: , Rfl:     linaCLOtide (Linzess) 72 MCG CAPS, Take 72 mcg by mouth in the morning, Disp: 90 capsule, Rfl: 3    Lyrica 200 MG capsule, Take 1 capsule (200 mg total) by mouth in the morning and 1 capsule (200 mg total) in the evening , Disp: 60 capsule, Rfl: 0    Multiple Vitamin (MULTI-VITAMIN PO), Take by mouth, Disp: , Rfl:     pantoprazole (PROTONIX) 40 mg tablet, Take 1 tablet (40 mg total) by mouth 2 (two) times a day, Disp: 60 tablet, Rfl: 0    ACTIVE PROBLEM LIST:  Patient Active Problem List   Diagnosis    Encounter for physical examination related to employment    Vitamin D deficiency    Gilbert's syndrome    Bipolar affective ([facility] [address] )    Controlled type 2 diabetes mellitus without complication, without long-term current use of insulin (HCC)    Diabetic polyneuropathy (HCC)    Lipodystrophy       PAST MEDICAL HISTORY:  Past Medical History:   Diagnosis Date    Anemia     iron def    Chronic pain disorder     feet    Depression     Diabetes mellitus (HCC)     neg since gastric bypass    GERD (gastroesophageal reflux disease)     Gilbert's syndrome     Neuropathy     feet sheila    Vitamin D deficiency        PAST SURGICAL HISTORY:  Past Surgical History:   Procedure Laterality Date     SECTION      X3    CHOLECYSTECTOMY      GASTRIC BYPASS      HERNIA REPAIR      [place] EXCISE EXCESS SKIN TISSUE,ABDOMEN N/A 2020    Procedure: ABDOMINALPLASTY,CIRCUMFERENTIAL;  Surgeon: Beatrice Ames MD;  Location:  MAIN OR;  Service: Plastics       FAMILY HISTORY:  Family History   Problem Relation Age of Onset    Mental illness Mother     Drug abuse Mother        SOCIAL HISTORY:  Social History     Socioeconomic History    Marital status: /Civil Union     Spouse name: Not on file    Number of children: Not on file    Years of education: Not on file    Highest education level: Not on file   Occupational History    Occupation:    Tobacco Use    Smoking status: Former Smoker     Packs/day: 0 50     Years: 5 00     Pack years: 2 50     Types: Cigarettes    Smokeless tobacco: Never Used   Vaping Use    Vaping Use: Never used   Substance and Sexual Activity    Alcohol use: Not Currently     Alcohol/week: 0 0 standard drinks     Comment: once a month    Drug use: Never    Sexual activity: Not Currently     Partners: Male     Birth control/protection: None   Other Topics Concern    Not on file   Social History Narrative    Not on file     Social Determinants of Health     Financial Resource Strain: Not on file   Food Insecurity: Not on file   Transportation Needs: Not on file   Physical Activity: Not on file   Stress: Not on file   Social Connections: Not on file   Intimate Partner Violence: Not on file   Housing Stability: Not on file       Review of Systems   Psychiatric/Behavioral: Positive for agitation, decreased concentration, dysphoric mood and self-injury  The patient is nervous/anxious and is hyperactive  All other systems reviewed and are negative  Objective:  Vitals:    06/03/22 0926   BP: 120/70   BP Location: Left arm   Patient Position: Sitting   Cuff Size: Standard   Pulse: 98   Resp: 16   Temp: 98 °F (36 7 °C)   TempSrc: Tympanic   SpO2: 99%   Weight: 62 6 kg (138 lb)   Height: 5' 4" (1 626 m)     Body mass index is 23 69 kg/m²  Physical Exam  Vitals and nursing note reviewed  Constitutional:       Appearance: Normal appearance  HENT:      Head: Normocephalic and atraumatic  Nose: Nose normal    Cardiovascular:      Rate and Rhythm: Normal rate     Pulmonary:      Effort: Pulmonary effort is normal    Skin:     General: Skin is dry  Findings: Erythema, lesion and rash present  Neurological:      Mental Status: She is alert  Psychiatric:         Mood and Affect: Mood is anxious  Affect is labile and angry  Speech: Speech is rapid and pressured  Behavior: Behavior is agitated, aggressive and hyperactive  RESULTS:    No results found for this or any previous visit (from the past 1008 hour(s))  This note was created with voice recognition software  Phonic, grammatical and spelling errors may be present within the note as a result

## 2022-06-08 ENCOUNTER — TELEPHONE (OUTPATIENT)
Dept: INTERNAL MEDICINE CLINIC | Facility: CLINIC | Age: 37
End: 2022-06-08

## 2022-06-08 ENCOUNTER — TELEPHONE (OUTPATIENT)
Dept: INFECTIOUS DISEASES | Facility: CLINIC | Age: 37
End: 2022-06-08

## 2022-06-08 NOTE — TELEPHONE ENCOUNTER
Called and spoke with pt that office provider reviewed her chart and there are no indication of a current infection or parasitic infection that would warrant appt with ID  She stated she will reach out to referring provider to get her referral to be with another ID

## 2022-06-08 NOTE — TELEPHONE ENCOUNTER
A nurse practitioner from [facility] called to speak with you when you have a minute please on this pt       Her name is Jennifer Faye -   [phone]    [facility]

## 2022-06-14 DIAGNOSIS — E13.42 DIABETIC POLYNEUROPATHY ASSOCIATED WITH OTHER SPECIFIED DIABETES MELLITUS (HCC): ICD-10-CM

## 2022-06-15 DIAGNOSIS — E13.42 DIABETIC POLYNEUROPATHY ASSOCIATED WITH OTHER SPECIFIED DIABETES MELLITUS (HCC): ICD-10-CM

## 2022-06-17 ENCOUNTER — ANESTHESIA (OUTPATIENT)
Dept: GASTROENTEROLOGY | Facility: HOSPITAL | Age: 37
End: 2022-06-17

## 2022-06-17 ENCOUNTER — HOSPITAL ENCOUNTER (OUTPATIENT)
Dept: GASTROENTEROLOGY | Facility: HOSPITAL | Age: 37
Setting detail: OUTPATIENT SURGERY
Discharge: HOME | End: 2022-06-17
Attending: INTERNAL MEDICINE | Admitting: INTERNAL MEDICINE
Payer: COMMERCIAL

## 2022-06-17 ENCOUNTER — ANESTHESIA EVENT (OUTPATIENT)
Dept: GASTROENTEROLOGY | Facility: HOSPITAL | Age: 37
End: 2022-06-17

## 2022-06-17 VITALS
HEIGHT: 64 IN | BODY MASS INDEX: 22.88 KG/M2 | HEART RATE: 69 BPM | RESPIRATION RATE: 15 BRPM | OXYGEN SATURATION: 100 % | DIASTOLIC BLOOD PRESSURE: 74 MMHG | SYSTOLIC BLOOD PRESSURE: 115 MMHG | WEIGHT: 134.04 LBS | TEMPERATURE: 98.6 F

## 2022-06-17 DIAGNOSIS — K21.9 GASTROESOPHAGEAL REFLUX DISEASE, UNSPECIFIED WHETHER ESOPHAGITIS PRESENT: ICD-10-CM

## 2022-06-17 DIAGNOSIS — R11.14 BILIOUS VOMITING WITH NAUSEA: ICD-10-CM

## 2022-06-17 LAB
EXT PREGNANCY TEST URINE: NEGATIVE
EXT. CONTROL: NORMAL
GLUCOSE SERPL-MCNC: 130 MG/DL (ref 65–140)

## 2022-06-17 PROCEDURE — 82948 REAGENT STRIP/BLOOD GLUCOSE: CPT

## 2022-06-17 PROCEDURE — 43239 EGD BIOPSY SINGLE/MULTIPLE: CPT | Performed by: INTERNAL MEDICINE

## 2022-06-17 PROCEDURE — 88305 TISSUE EXAM BY PATHOLOGIST: CPT | Performed by: PATHOLOGY

## 2022-06-17 PROCEDURE — 81025 URINE PREGNANCY TEST: CPT | Performed by: ANESTHESIOLOGY

## 2022-06-17 RX ORDER — SODIUM CHLORIDE, SODIUM LACTATE, POTASSIUM CHLORIDE, CALCIUM CHLORIDE 600; 310; 30; 20 MG/100ML; MG/100ML; MG/100ML; MG/100ML
50 INJECTION, SOLUTION INTRAVENOUS CONTINUOUS
Status: DISCONTINUED | OUTPATIENT
Start: 2022-06-17 | End: 2022-06-21 | Stop reason: HOSPADM

## 2022-06-17 RX ORDER — LIDOCAINE HYDROCHLORIDE 20 MG/ML
INJECTION, SOLUTION EPIDURAL; INFILTRATION; INTRACAUDAL; PERINEURAL AS NEEDED
Status: DISCONTINUED | OUTPATIENT
Start: 2022-06-17 | End: 2022-06-17

## 2022-06-17 RX ORDER — PROPOFOL 10 MG/ML
INJECTION, EMULSION INTRAVENOUS AS NEEDED
Status: DISCONTINUED | OUTPATIENT
Start: 2022-06-17 | End: 2022-06-17

## 2022-06-17 RX ORDER — LIDOCAINE HYDROCHLORIDE 10 MG/ML
0.5 INJECTION, SOLUTION EPIDURAL; INFILTRATION; INTRACAUDAL; PERINEURAL ONCE AS NEEDED
Status: DISCONTINUED | OUTPATIENT
Start: 2022-06-17 | End: 2022-06-21 | Stop reason: HOSPADM

## 2022-06-17 RX ADMIN — SODIUM CHLORIDE, SODIUM LACTATE, POTASSIUM CHLORIDE, AND CALCIUM CHLORIDE 50 ML/HR: .6; .31; .03; .02 INJECTION, SOLUTION INTRAVENOUS at 08:50

## 2022-06-17 RX ADMIN — PROPOFOL 50 MG: 10 INJECTION, EMULSION INTRAVENOUS at 09:37

## 2022-06-17 RX ADMIN — PROPOFOL 150 MG: 10 INJECTION, EMULSION INTRAVENOUS at 09:33

## 2022-06-17 RX ADMIN — LIDOCAINE HYDROCHLORIDE 80 MG: 20 INJECTION, SOLUTION EPIDURAL; INFILTRATION; INTRACAUDAL at 09:33

## 2022-06-17 NOTE — ANESTHESIA POSTPROCEDURE EVALUATION
Post-Op Assessment Note    CV Status:  Stable    Pain management: adequate     Mental Status:  Alert and awake   Hydration Status:  Euvolemic   PONV Controlled:  Controlled   Airway Patency:  Patent      Post Op Vitals Reviewed: Yes            No complications documented      BP   106/70   Temp      Pulse  72   Resp   14   SpO2   99

## 2022-06-17 NOTE — H&P
History and Physical -  [facility]  Aris Ibrahim 43 y o  female MRN: [id number]      HPI: Aris Ibrahim is a 43y o  year old female who presents for evaluation of gastroesophageal reflux disease, nausea, vomiting, abdominal pain      REVIEW OF SYSTEMS: Per the HPI, and otherwise unremarkable  Historical Information   Past Medical History:   Diagnosis Date    Anemia     iron def    Chronic pain disorder     feet    Depression     Diabetes mellitus (HCC)     neg since gastric bypass    GERD (gastroesophageal reflux disease)     Gilbert's syndrome     Neuropathy     feet sheila    Vitamin D deficiency      Past Surgical History:   Procedure Laterality Date     SECTION      X3    CHOLECYSTECTOMY      GASTRIC BYPASS      HERNIA REPAIR      [place] EXCISE EXCESS SKIN TISSUE,ABDOMEN N/A 2020    Procedure: ABDOMINALPLASTY,CIRCUMFERENTIAL;  Surgeon: Beatrice Ames MD;  Location:  MAIN OR;  Service: Plastics     Social History   Social History     Substance and Sexual Activity   Alcohol Use Not Currently    Alcohol/week: 0 0 standard drinks    Comment: once a month     Social History     Substance and Sexual Activity   Drug Use Never     Social History     Tobacco Use   Smoking Status Former Smoker    Packs/day: 0 50    Years: 5 00    Pack years: 2 50    Types: Cigarettes   Smokeless Tobacco Never Used     Family History   Problem Relation Age of Onset    Mental illness Mother     Drug abuse Mother        Meds/Allergies     (Not in a hospital admission)      No Known Allergies    Objective     Blood pressure 114/74, pulse 86, temperature 97 7 °F (36 5 °C), resp  rate 17, height 5' 4" (1 626 m), weight 60 8 kg (134 lb 0 6 oz), SpO2 100 %  PHYSICAL EXAM    Gen: NAD  CV: RRR  CHEST: Clear  ABD: soft, NT/ND  EXT: no edema      ASSESSMENT/PLAN:  This is a 43y o  year old female here for EGD with biopsies, and she is stable and optimized for her procedure

## 2022-06-17 NOTE — ANESTHESIA PREPROCEDURE EVALUATION
Procedure:  EGD    Relevant Problems   ENDO   (+) Controlled type 2 diabetes mellitus without complication, without long-term current use of insulin (HCC)      NEURO/PSYCH   (+) Diabetic polyneuropathy (HCC)      Musculoskeletal and Integument   (+) Lipodystrophy      Other   (+) Bipolar affective (HCC)   (+) Gilbert's syndrome   (+) Vitamin D deficiency        Physical Exam    Airway    Mallampati score: II  TM Distance: >3 FB  Neck ROM: full     Dental   Comment: Two front teeth caps are loose per patient , No notable dental hx     Cardiovascular  Cardiovascular exam normal    Pulmonary  Pulmonary exam normal Breath sounds clear to auscultation,     Other Findings        Anesthesia Plan  ASA Score- 2     Anesthesia Type- IV sedation with anesthesia with ASA Monitors  Additional Monitors:   Airway Plan:           Plan Factors-    Chart reviewed  EKG reviewed  Imaging results reviewed  Existing labs reviewed  Patient summary reviewed  Patient is not a current smoker  Patient instructed to abstain from smoking on day of procedure  Patient did not smoke on day of surgery  Obstructive sleep apnea risk education given perioperatively  Induction- intravenous  Postoperative Plan-     Informed Consent- Anesthetic plan and risks discussed with patient  I personally reviewed this patient with the CRNA  Discussed and agreed on the Anesthesia Plan with the CRNA             Lab Results   Component Value Date    WBC 8 79 04/17/2022    HGB 12 9 04/17/2022    HCT 40 6 04/17/2022    MCV 86 04/17/2022     04/17/2022     Lab Results   Component Value Date    CALCIUM 8 9 04/17/2022    K 3 7 04/17/2022    CO2 27 04/17/2022     04/17/2022    BUN 10 04/17/2022    CREATININE 0 60 04/17/2022     Lab Results   Component Value Date    INR 0 94 12/30/2019    PROTIME 12 2 12/30/2019     Lab Results   Component Value Date    PTT 27 12/30/2019           Discussed with pt the benefits/alternatives and risks or General Anesthesia including breathing tube remaining in place if not strong enough, PONV, damage to lips and teeth, sore throat, eye injury or blindness    I, Dr Teri Taylor, the attending physician, have personally seen and evaluated the patient prior to anesthetic care  I have reviewed the pre-anesthetic record, and other medical records if appropriate to the anesthetic care  If a CRNA is involved in the case, I have reviewed the CRNA assessment, if present, and agree  The patient is in a suitable condition to proceed with my formulated anesthetic plan

## 2022-06-20 ENCOUNTER — OFFICE VISIT (OUTPATIENT)
Dept: DERMATOLOGY | Facility: CLINIC | Age: 37
End: 2022-06-20
Payer: COMMERCIAL

## 2022-06-20 VITALS — WEIGHT: 134 LBS | HEIGHT: 64 IN | BODY MASS INDEX: 22.88 KG/M2

## 2022-06-20 DIAGNOSIS — T07.XXXA MULTIPLE EXCORIATIONS: Primary | ICD-10-CM

## 2022-06-20 DIAGNOSIS — Z13.89 SCREENING FOR SKIN CONDITION: ICD-10-CM

## 2022-06-20 PROCEDURE — 99213 OFFICE O/P EST LOW 20 MIN: CPT | Performed by: DERMATOLOGY

## 2022-06-20 PROCEDURE — 3008F BODY MASS INDEX DOCD: CPT | Performed by: INTERNAL MEDICINE

## 2022-06-20 NOTE — PROGRESS NOTES
[facility] DERMATOLOGY  [address] [place] [id number]  [phone]  [phone]     MRN: [id number] : 1985  Encounter: [id number]  Patient Information: Unique Marks  Chief complaint:  Follow-up for excoriations    History of present illness:  41-year-old female presents for follow-up secondary to the multiple excoriations that had noted previously patient continues to feel that there is some type a Flyer insect that is causing this process has basically been seen by my colleague Dr Mannie Silva as well as back dedicated dermatology also seen at the emergency room on occasion treat treated with Flagyl orally as well topically patient felt some on improvement with this process however no specific treatment really seems to control it she also reported that the topical steroid I had given her previously also help for appear to  Past Medical History:   Diagnosis Date    Anemia     iron def    Chronic pain disorder     feet    Depression     Diabetes mellitus ([address] )     neg since gastric bypass    GERD (gastroesophageal reflux disease)     Gilbert's syndrome     Neuropathy     feet sheila    Vitamin D deficiency      Past Surgical History:   Procedure Laterality Date     SECTION      X3    CHOLECYSTECTOMY      GASTRIC BYPASS      HERNIA REPAIR      [place] EXCISE EXCESS SKIN TISSUE,ABDOMEN N/A 2020    Procedure: ABDOMINALPLASTY,CIRCUMFERENTIAL;  Surgeon: Anne Moore MD;  Location:  MAIN OR;  Service: Plastics     Social History   Social History     Substance and Sexual Activity   Alcohol Use Not Currently    Alcohol/week: 0 0 standard drinks    Comment: once a month     Social History     Substance and Sexual Activity   Drug Use Never     Social History     Tobacco Use   Smoking Status Former Smoker    Packs/day: 0 50    Years: 5 00    Pack years: 2 50    Types: Cigarettes   Smokeless Tobacco Never Used     Family History   Problem Relation Age of Onset    Mental illness Mother     Drug abuse Mother      Meds/Allergies   No Known Allergies    Meds:  Prior to Admission medications    Medication Sig Start Date End Date Taking?  Authorizing Provider   ALPRAZosheeba Moore) 0 5 mg tablet TAKE ONE TABLET BY MOUTH THREE TIMES A DAY ( 2 WEEK SUPPLY ) 4/27/22  Yes Historical Provider, MD   betamethasone dipropionate (DIPROSONE) 0 05 % cream Apply topically 2 (two) times a day 3/16/22  Yes Awa Jimenez MD   busPIRone (BUSPAR) 5 mg tablet TAKE ONE TABLET BY MOUTH THREE TIMES A DAY ( 2 WEEK SUPPLY ) 4/27/22  Yes Historical Provider, MD   Cyanocobalamin (B-12) 1000 MCG SUBL PLACE ONE TABLET UNDER THE TONGUE EVERY DAY 1/3/22  Yes Historical Provider, MD   doxepin (SINEquan) 50 mg capsule TAKE ONE CAPSULE BY MOUTH AT BEDTIME ( 2 WEEK SUPPLY ) 4/27/22  Yes Historical Provider, MD   ergocalciferol (VITAMIN D2) 50,000 units TAKE 1 CAPSULE BY MOUTH ONE TIME PER WEEK 12/21/19  Yes Historical Provider, MD   ferrous sulfate 325 (65 Fe) mg tablet TAKE 1 TABLET BY MOUTH TWICE A DAY WITH A MEAL AND VITAMIN C 250 MG 12/16/19  Yes Historical Provider, MD   gatifloxacin (ZYMAXID) 0 5 % INSTILL 1 DROP INTO RIGHT EYE FOUR TIMES A DAY 3/2/22  Yes Historical Provider, MD   hydrOXYzine HCL (ATARAX) 10 mg tablet Take 1 tablet (10 mg total) by mouth every 6 (six) hours as needed for itching 3/16/22  Yes Awa Jimenez MD   lamoTRIgine (LaMICtal) 100 mg tablet Take 100 mg by mouth daily at bedtime 4/14/22  Yes Historical Provider, MD   lamoTRIgine (LaMICtal) 25 mg tablet TAKE ONE TABLET DAILY FOR 7 DAYS AT BEDTIME FOR 7 DAYS THEN TAKE 2 TABLETS AT BEDTIME FOR 7 DAYS 3/31/22  Yes Historical Provider, MD   linaCLOtide (Linzess) 72 MCG CAPS Take 72 mcg by mouth in the morning 5/5/22  Yes Carmelina Macias PA-C   Lyrica 200 MG capsule Take 1 capsule (200 mg total) by mouth 2 (two) times a day 6/15/22 7/15/22 Yes Ricardo Bautista MD   Multiple Vitamin (MULTI-VITAMIN PO) Take by mouth   Yes Historical Provider, MD   pantoprazole (PROTONIX) 40 mg tablet Take 1 tablet (40 mg total) by mouth 2 (two) times a day 5/5/22  Yes Ernesto Snyder PA-C       Subjective:     Review of Systems:    General: negative for - chills, fatigue, fever,  weight gain or weight loss  Psychological: negative for - anxiety, behavioral disorder, concentration difficulties, decreased libido, depression, irritability, memory difficulties, mood swings, sleep disturbances or suicidal ideation  ENT: negative for - hearing difficulties , nasal congestion, nasal discharge, oral lesions, sinus pain, sneezing, sore throat  Allergy and Immunology: negative for - hives, insect bite sensitivity,  Hematological and Lymphatic: negative for - bleeding problems, blood clots,bruising, swollen lymph nodes  Endocrine: negative for - hair pattern changes, hot flashes, malaise/lethargy, mood swings, palpitations, polydipsia/polyuria, skin changes, temperature intolerance or unexpected weight change  Respiratory: negative for - cough, hemoptysis, orthopnea, shortness of breath, or wheezing  Cardiovascular: negative for - chest pain, dyspnea on exertion, edema,  Gastrointestinal: negative for - abdominal pain, nausea/vomiting  Genito-Urinary: negative for - dysuria, incontinence, irregular/heavy menses or urinary frequency/urgency  Musculoskeletal: negative for - gait disturbance, joint pain, joint stiffness, joint swelling, muscle pain, muscular weakness  Dermatological:  As in HPI  Neurological: negative for confusion, dizziness, headaches, impaired coordination/balance, memory loss, numbness/tingling, seizures, speech problems, tremors or weakness       Objective:   Ht 5' 4" (1 626 m)   Wt 60 8 kg (134 lb)   BMI 23 00 kg/m²     Physical Exam:    General Appearance:    Alert, cooperative, no distress   Head:    Normocephalic, without obvious abnormality, atraumatic   Lymphatics:    No lymphadenopathy noted      Abdomen:   No hepatosplenomegaly   Skin:   A full skin exam was performed including scalp, head scalp, eyes, ears, nose, lips, neck, chest, axilla, abdomen, back, buttocks, bilateral upper extremities, bilateral lower extremities, hands, feet, fingers, toes, fingernails, and toenails numerous excoriations on his face arms upper back and scalp:  Areas she can reach without any primary process noted     Assessment:     1  Multiple excoriations     2  Screening for skin condition           Plan:   Excoriations we discussed the possibility that this is partially related to neuropathy from her diabetes as well as a continued picking at the area advised that really cannot see any primary process to consider biopsy or growing after at this point to see if we get better delineate this process  I also explained to her that there really is nothing that we know of that would cause this type of infestation or irritation to her skin  But even if there is most of the damage right now is done by her picking and scratching will go ahead and refill her topical steroid hopefully get this under better control    Qamar Yin MD  6/20/2022,3:36 PM    Portions of the record may have been created with voice recognition software   Occasional wrong word or "sound a like" substitutions may have occurred due to the inherent limitations of voice recognition software   Read the chart carefully and recognize, using context, where substitutions have occurred

## 2022-06-20 NOTE — PATIENT INSTRUCTIONS
Excoriations we discussed the possibility that this is partially related to neuropathy from her diabetes as well as a continued picking at the area advised that really cannot see any primary process to consider biopsy or growing after at this point to see if we get better delineate this process  I also explained to her that there really is nothing that we know of that would cause this type of infestation or irritation to her skin    But even if there is most of the damage right now is done by her picking and scratching will go ahead and refill her topical steroid hopefully get this under better control

## 2022-07-08 ENCOUNTER — TELEPHONE (OUTPATIENT)
Dept: GASTROENTEROLOGY | Facility: CLINIC | Age: 37
End: 2022-07-08

## 2022-07-08 NOTE — TELEPHONE ENCOUNTER
----- Message from Veronica Andrea DO sent at 7/8/2022 12:15 PM EDT -----  Please call the patient with the biopsy results  Biopsies the small bowel were benign and negative for celiac disease or cancer  Biopsies of the stomach were benign and negative for Helicobacter pylori or for cancer

## 2022-07-08 NOTE — TELEPHONE ENCOUNTER
Patient called back and got disconnected  Called patient back and gave her test results   Patient voiced understanding and had no further questions or concerns

## 2022-07-11 ENCOUNTER — DOCUMENTATION (OUTPATIENT)
Dept: INTERNAL MEDICINE CLINIC | Facility: CLINIC | Age: 37
End: 2022-07-11

## 2022-07-11 DIAGNOSIS — E13.42 DIABETIC POLYNEUROPATHY ASSOCIATED WITH OTHER SPECIFIED DIABETES MELLITUS (HCC): ICD-10-CM

## 2022-07-12 DIAGNOSIS — E13.42 DIABETIC POLYNEUROPATHY ASSOCIATED WITH OTHER SPECIFIED DIABETES MELLITUS (HCC): ICD-10-CM

## 2022-07-12 NOTE — TELEPHONE ENCOUNTER
Pt wants us to resend the lyrica prescription to [facility] pharmacy in [facility]   She said they have it in [address]      She says [facility] wont have it for a week and cant wait that long

## 2022-07-25 ENCOUNTER — TELEPHONE (OUTPATIENT)
Dept: INFECTIOUS DISEASES | Facility: CLINIC | Age: 37
End: 2022-07-25

## 2022-07-25 ENCOUNTER — OFFICE VISIT (OUTPATIENT)
Dept: DERMATOLOGY | Facility: CLINIC | Age: 37
End: 2022-07-25
Payer: COMMERCIAL

## 2022-07-25 VITALS — BODY MASS INDEX: 22.88 KG/M2 | HEIGHT: 64 IN | WEIGHT: 134 LBS

## 2022-07-25 DIAGNOSIS — K59.00 CONSTIPATION, UNSPECIFIED CONSTIPATION TYPE: ICD-10-CM

## 2022-07-25 DIAGNOSIS — L29.9 PRURITUS: ICD-10-CM

## 2022-07-25 DIAGNOSIS — T07.XXXA MULTIPLE EXCORIATIONS: Primary | ICD-10-CM

## 2022-07-25 PROCEDURE — 99213 OFFICE O/P EST LOW 20 MIN: CPT | Performed by: DERMATOLOGY

## 2022-07-25 RX ORDER — LINACLOTIDE 72 UG/1
CAPSULE, GELATIN COATED ORAL
Qty: 30 CAPSULE | Refills: 4 | Status: SHIPPED | OUTPATIENT
Start: 2022-07-25

## 2022-07-25 NOTE — TELEPHONE ENCOUNTER
Patient went to ENT yesterday  She calls today very upset because they want us to see patient and according to patient, we refused to provide her an appointment  She wants to speak with someone who handles scheduling

## 2022-07-25 NOTE — PATIENT INSTRUCTIONS
Excoriation no real explanation for this process I think part of it is neuropathy question also has related to any of her medications causing her side effects  Pseudomonas cultured from her ear canal patient advised to see infectious disease to rule make sure there is no infectious process to be treated  Patient also suggested to see her family physician discuss potential treatment for her anxiety related to this process as well as this may be exacerbating this condition

## 2022-07-25 NOTE — TELEPHONE ENCOUNTER
Spoke with pt regarding an appt  She stats that she is now needs to be seen Dr Katie Cao ENT in [place] has results  Asked her what results exactly  Pt was referred to our office back in March for parasitic infection at that time referral was denied as there was no supporting information of parasitic infection warranting ID appt  Did inform her that if Dr Katie Cao was referring her to send referral along with testing  Pt then hung up the phone

## 2022-07-25 NOTE — PROGRESS NOTES
[facility] DERMATOLOGY  [address] [place] [address]  Sonia Nullma [id number]  [phone]  [phone]     MRN: [id number] : 1985  Encounter: [id number]  Patient Information: Linh Romero  Chief complaint: hearing loss lesions coming out of nose and lip     History of present illness:  22-year-old female presents in follow-up with continued irritation for skin for the past year continues to see multiple physicians for complaints of now she is complaining of hearing loss along with lesions coming out  her nose and lip  Patient complains of involvement also on the upper part of her feet and face and scalp only nose mouth and ears she was seen by Dr Medhat Mike give culture that found Pseudomonas and he advised her to see an infectious disease doctor    Patient concerned regarding the especially on the feet she has been on treatment for neuropathy by her primary care physician  Past Medical History:   Diagnosis Date    Anemia     iron def    Chronic pain disorder     feet    Depression     Diabetes mellitus ([address] )     neg since gastric bypass    GERD (gastroesophageal reflux disease)     Gilbert's syndrome     Neuropathy     feet sheila    Vitamin D deficiency      Past Surgical History:   Procedure Laterality Date     SECTION      X3    CHOLECYSTECTOMY      GASTRIC BYPASS      HERNIA REPAIR      [place] EXCISE EXCESS SKIN TISSUE,ABDOMEN N/A 2020    Procedure: Krystyna Fossa;  Surgeon: Sherryle Rutherford, MD;  Location: [address] [place] [address];  Service: Plastics     Social History   Social History     Substance and Sexual Activity   Alcohol Use Not Currently    Alcohol/week: 0 0 standard drinks    Comment: once a month     Social History     Substance and Sexual Activity   Drug Use Never     Social History     Tobacco Use   Smoking Status Former Smoker    Packs/day: 0 50    Years: 5 00    Pack years: 2 50    Types: Cigarettes   Smokeless Tobacco Never Used Family History   Problem Relation Age of Onset    Mental illness Mother     Drug abuse Mother      Meds/Allergies   No Known Allergies    Meds:  Prior to Admission medications    Medication Sig Start Date End Date Taking?  Authorizing Provider   ALPRAZolam Ardelle Rhinaer) 0 5 mg tablet TAKE ONE TABLET BY MOUTH THREE TIMES A DAY ( 2 WEEK SUPPLY ) 4/27/22  Yes Historical Provider, MD   betamethasone dipropionate (DIPROSONE) 0 05 % cream Apply topically 2 (two) times a day 3/16/22  Yes Kyaw Guadalupe MD   busPIRone (BUSPAR) 5 mg tablet TAKE ONE TABLET BY MOUTH THREE TIMES A DAY ( 2 WEEK SUPPLY ) 4/27/22  Yes Historical Provider, MD   Cyanocobalamin (B-12) 1000 MCG SUBL PLACE ONE TABLET UNDER THE TONGUE EVERY DAY 1/3/22  Yes Historical Provider, MD   doxepin (SINEquan) 50 mg capsule TAKE ONE CAPSULE BY MOUTH AT BEDTIME ( 2 WEEK SUPPLY ) 4/27/22  Yes Historical Provider, MD   ergocalciferol (VITAMIN D2) 50,000 units TAKE 1 CAPSULE BY MOUTH ONE TIME PER WEEK 12/21/19  Yes Historical Provider, MD   ferrous sulfate 325 (65 Fe) mg tablet TAKE 1 TABLET BY MOUTH TWICE A DAY WITH A MEAL AND VITAMIN C 250 MG 12/16/19  Yes Historical Provider, MD   gatifloxacin (ZYMAXID) 0 5 % INSTILL 1 DROP INTO RIGHT EYE FOUR TIMES A DAY 3/2/22  Yes Historical Provider, MD   hydrOXYzine HCL (ATARAX) 10 mg tablet Take 1 tablet (10 mg total) by mouth every 6 (six) hours as needed for itching 3/16/22  Yes Kyaw Guadalupe MD   lamoTRIgine (LaMICtal) 100 mg tablet Take 100 mg by mouth daily at bedtime 4/14/22  Yes Historical Provider, MD   lamoTRIgine (LaMICtal) 25 mg tablet TAKE ONE TABLET DAILY FOR 7 DAYS AT BEDTIME FOR 7 DAYS THEN TAKE 2 TABLETS AT BEDTIME FOR 7 DAYS 3/31/22  Yes Historical Provider, MD   linaCLOtide (Linzess) 72 MCG CAPS Take 72 mcg by mouth in the morning 5/5/22  Yes Yris Zabala PA-C   Lyrica 200 MG capsule Take 1 capsule (200 mg total) by mouth 2 (two) times a day 7/12/22 8/11/22 Yes Rajan Martines MD   metroNIDAZOLE (METROGEL) 0 75 % gel APPLY TO CLEAN HEALED SKIN ONCE DAILY ( DO NOT USE UNTIL SKIN HAS COMPLETELY HEALSED  ) 6/8/22  Yes Historical Provider, MD   mometasone (ELOCON) 0 1 % cream APPLY TO AFFECTED AREAS ON FACE TWICE DAILY 6/9/22  Yes Historical Provider, MD   Multiple Vitamin (MULTI-VITAMIN PO) Take by mouth   Yes Historical Provider, MD   mupirocin (BACTROBAN) 2 % ointment APPLY TO ALL AREAS OF SORE/PICKED/RUBBED SKIN TWICE DAILY FOR 2 TO 3 WEEKS  6/8/22  Yes Historical Provider, MD   Noritate 1 % cream APPLY TO AFFECTED AREA DAILY FOR 10 DAYS 6/10/22  Yes Historical Provider, MD   pantoprazole (PROTONIX) 40 mg tablet Take 1 tablet (40 mg total) by mouth 2 (two) times a day 5/5/22  Yes Adrienne Brandt PA-C       Subjective:     Review of Systems:    General: negative for - chills, fatigue, fever,  weight gain or weight loss  Psychological: negative for - anxiety, behavioral disorder, concentration difficulties, decreased libido, depression, irritability, memory difficulties, mood swings, sleep disturbances or suicidal ideation  ENT: negative for - hearing difficulties , nasal congestion, nasal discharge, oral lesions, sinus pain, sneezing, sore throat  Allergy and Immunology: negative for - hives, insect bite sensitivity,  Hematological and Lymphatic: negative for - bleeding problems, blood clots,bruising, swollen lymph nodes  Endocrine: negative for - hair pattern changes, hot flashes, malaise/lethargy, mood swings, palpitations, polydipsia/polyuria, skin changes, temperature intolerance or unexpected weight change  Respiratory: negative for - cough, hemoptysis, orthopnea, shortness of breath, or wheezing  Cardiovascular: negative for - chest pain, dyspnea on exertion, edema,  Gastrointestinal: negative for - abdominal pain, nausea/vomiting  Genito-Urinary: negative for - dysuria, incontinence, irregular/heavy menses or urinary frequency/urgency  Musculoskeletal: negative for - gait disturbance, joint pain, joint stiffness, joint swelling, muscle pain, muscular weakness  Dermatological:  As in HPI  Neurological: negative for confusion, dizziness, headaches, impaired coordination/balance, memory loss, numbness/tingling, seizures, speech problems, tremors or weakness       Objective:   Ht 5' 4" (1 626 m)   Wt 60 8 kg (134 lb)   BMI 23 00 kg/m²     Physical Exam:    General Appearance:    Alert, cooperative, no distress   Head:    Normocephalic, without obvious abnormality, atraumatic           Skin:   A full skin exam was performed including scalp, head scalp, eyes, ears, nose, lips, neck, chest, axilla, abdomen, back, buttocks, bilateral upper extremities, bilateral lower extremities, hands, feet, fingers, toes, fingernails, and toenails had occasional areas of irritation noted on the face and dorsum of the feet     Assessment:     1  Multiple excoriations     2  Pruritus           Plan:   Excoriation no real explanation for this process I think part of it is neuropathy question also has related to any of her medications causing her side effects  Pseudomonas cultured from her ear canal patient advised to see infectious disease to rule make sure there is no infectious process to be treated  Patient also suggested to see her family physician discuss potential treatment for her anxiety related to this process as well as this may be exacerbating this condition  Aruna Rogers MD  5/94/4152,8:20 PM    Portions of the record may have been created with voice recognition software   Occasional wrong word or "sound a like" substitutions may have occurred due to the inherent limitations of voice recognition software   Read the chart carefully and recognize, using context, where substitutions have occurred

## 2022-08-04 ENCOUNTER — OFFICE VISIT (OUTPATIENT)
Dept: INTERNAL MEDICINE CLINIC | Facility: CLINIC | Age: 37
End: 2022-08-04
Payer: COMMERCIAL

## 2022-08-04 VITALS
OXYGEN SATURATION: 97 % | TEMPERATURE: 99.7 F | BODY MASS INDEX: 22.88 KG/M2 | RESPIRATION RATE: 16 BRPM | WEIGHT: 134 LBS | DIASTOLIC BLOOD PRESSURE: 80 MMHG | HEIGHT: 64 IN | SYSTOLIC BLOOD PRESSURE: 130 MMHG | HEART RATE: 101 BPM

## 2022-08-04 DIAGNOSIS — F31.9 BIPOLAR AFFECTIVE DISORDER, REMISSION STATUS UNSPECIFIED (HCC): ICD-10-CM

## 2022-08-04 DIAGNOSIS — E11.9 CONTROLLED TYPE 2 DIABETES MELLITUS WITHOUT COMPLICATION, WITHOUT LONG-TERM CURRENT USE OF INSULIN (HCC): Primary | ICD-10-CM

## 2022-08-04 DIAGNOSIS — R21 RASH AND NONSPECIFIC SKIN ERUPTION: ICD-10-CM

## 2022-08-04 PROCEDURE — 99214 OFFICE O/P EST MOD 30 MIN: CPT | Performed by: INTERNAL MEDICINE

## 2022-08-04 PROCEDURE — 3075F SYST BP GE 130 - 139MM HG: CPT | Performed by: INTERNAL MEDICINE

## 2022-08-04 PROCEDURE — 3079F DIAST BP 80-89 MM HG: CPT | Performed by: INTERNAL MEDICINE

## 2022-08-04 NOTE — PROGRESS NOTES
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   She has seen Dermatology and has been to multiple ERs in the past few months  Did send her lice shampoo  She brought with her a specimen cup with what appeared to be thread  Quality Measures:       Return in about 1 month (around 9/4/2022)  No problem-specific Assessment & Plan notes found for this encounter  Diagnoses and all orders for this visit:    Controlled type 2 diabetes mellitus without complication, without long-term current use of insulin (HCC)  -     POCT urine microalbumin/creatinine    Rash and nonspecific skin eruption  -     permethrin (NIX) 1 % lotion; Apply topically once for 1 dose Shampoo, rinse and towel dry hair, saturate hair and scalp with permethrin  Rinse after 10 min; repeat in 1 week if needed  -     pyrethrins-piperonyl butoxide 0 33-4 % shampoo; Apply topically once for 1 dose    Bipolar affective disorder, remission status unspecified ([facility] )          Subjective:      Patient ID: Carolin Hoffman is a 43 y o  female      Here for DM      ALLERGIES:  No Known Allergies    CURRENT MEDICATIONS:    Current Outpatient Medications:     ALPRAZolam (XANAX) 0 5 mg tablet, TAKE ONE TABLET BY MOUTH THREE TIMES A DAY ( 2 WEEK SUPPLY ), Disp: , Rfl:     betamethasone dipropionate (DIPROSONE) 0 05 % cream, Apply topically 2 (two) times a day, Disp: 45 g, Rfl: 1    busPIRone (BUSPAR) 5 mg tablet, TAKE ONE TABLET BY MOUTH THREE TIMES A DAY ( 2 WEEK SUPPLY ), Disp: , Rfl:     Cyanocobalamin (B-12) 1000 MCG SUBL, PLACE ONE TABLET UNDER THE TONGUE EVERY DAY, Disp: , Rfl:     doxepin (SINEquan) 50 mg capsule, TAKE ONE CAPSULE BY MOUTH AT BEDTIME ( 2 WEEK SUPPLY ), Disp: , Rfl:     ergocalciferol (VITAMIN D2) 50,000 units, TAKE 1 CAPSULE BY MOUTH ONE TIME PER WEEK, Disp: , Rfl: 0    ferrous sulfate 325 (65 Fe) mg tablet, TAKE 1 TABLET BY MOUTH TWICE A DAY WITH A MEAL AND VITAMIN C 250 MG, Disp: , Rfl: 3    gatifloxacin (ZYMAXID) 0 5 %, INSTILL 1 DROP INTO RIGHT EYE FOUR TIMES A DAY, Disp: , Rfl:     hydrOXYzine HCL (ATARAX) 10 mg tablet, Take 1 tablet (10 mg total) by mouth every 6 (six) hours as needed for itching, Disp: 60 tablet, Rfl: 2    lamoTRIgine (LaMICtal) 100 mg tablet, Take 100 mg by mouth daily at bedtime, Disp: , Rfl:     lamoTRIgine (LaMICtal) 25 mg tablet, TAKE ONE TABLET DAILY FOR 7 DAYS AT BEDTIME FOR 7 DAYS THEN TAKE 2 TABLETS AT BEDTIME FOR 7 DAYS, Disp: , Rfl:     Linzess 72 MCG CAPS, TAKE ONE CAPSULE BY MOUTH EVERY DAY BEFORE BREAKFAST, Disp: 30 capsule, Rfl: 4    Lyrica 200 MG capsule, Take 1 capsule (200 mg total) by mouth 2 (two) times a day, Disp: 60 capsule, Rfl: 0    metroNIDAZOLE (METROGEL) 0 75 % gel, APPLY TO CLEAN HEALED SKIN ONCE DAILY ( DO NOT USE UNTIL SKIN HAS COMPLETELY HEALSED  ), Disp: , Rfl:     mometasone (ELOCON) 0 1 % cream, APPLY TO AFFECTED AREAS ON FACE TWICE DAILY, Disp: , Rfl:     Multiple Vitamin (MULTI-VITAMIN PO), Take by mouth, Disp: , Rfl:     mupirocin (BACTROBAN) 2 % ointment, APPLY TO ALL AREAS OF SORE/PICKED/RUBBED SKIN TWICE DAILY FOR 2 TO 3 WEEKS , Disp: , Rfl:     Noritate 1 % cream, APPLY TO AFFECTED AREA DAILY FOR 10 DAYS, Disp: , Rfl:     pantoprazole (PROTONIX) 40 mg tablet, Take 1 tablet (40 mg total) by mouth 2 (two) times a day, Disp: 60 tablet, Rfl: 0    permethrin (NIX) 1 % lotion, Apply topically once for 1 dose Shampoo, rinse and towel dry hair, saturate hair and scalp with permethrin   Rinse after 10 min; repeat in 1 week if needed, Disp: 59 mL, Rfl: 0   pyrethrins-piperonyl butoxide 0 33-4 % shampoo, Apply topically once for 1 dose, Disp: 60 mL, Rfl: 0    ACTIVE PROBLEM LIST:  Patient Active Problem List   Diagnosis    Encounter for physical examination related to employment    Vitamin D deficiency    Gilbert's syndrome    Bipolar affective ([facility] )    Controlled type 2 diabetes mellitus without complication, without long-term current use of insulin ([facility] )    Diabetic polyneuropathy ([facility] )    Lipodystrophy       PAST MEDICAL HISTORY:  Past Medical History:   Diagnosis Date    Anemia     iron def    Chronic pain disorder     feet    Depression     Diabetes mellitus (HCC)     neg since gastric bypass    GERD (gastroesophageal reflux disease)     Gilbert's syndrome     Neuropathy     feet sheila    Vitamin D deficiency        PAST SURGICAL HISTORY:  Past Surgical History:   Procedure Laterality Date     SECTION      X3    CHOLECYSTECTOMY      GASTRIC BYPASS      HERNIA REPAIR      [place] EXCISE EXCESS SKIN TISSUE,ABDOMEN N/A 2020    Procedure: ABDOMINALPLASTY,CIRCUMFERENTIAL;  Surgeon: Laurita Mac MD;  Location:  MAIN OR;  Service: Plastics       FAMILY HISTORY:  Family History   Problem Relation Age of Onset    Mental illness Mother     Drug abuse Mother        SOCIAL HISTORY:  Social History     Socioeconomic History    Marital status: /Civil Union     Spouse name: Not on file    Number of children: Not on file    Years of education: Not on file    Highest education level: Not on file   Occupational History    Occupation:    Tobacco Use    Smoking status: Former Smoker     Packs/day: 0 50     Years: 5 00     Pack years: 2 50     Types: Cigarettes    Smokeless tobacco: Never Used   Vaping Use    Vaping Use: Never used   Substance and Sexual Activity    Alcohol use: Not Currently     Alcohol/week: 0 0 standard drinks     Comment: once a month    Drug use: Never    Sexual activity: Not Currently     Partners: Male Birth control/protection: None   Other Topics Concern    Not on file   Social History Narrative    Not on file     Social Determinants of Health     Financial Resource Strain: Not on file   Food Insecurity: Not on file   Transportation Needs: Not on file   Physical Activity: Not on file   Stress: Not on file   Social Connections: Not on file   Intimate Partner Violence: Not on file   Housing Stability: Not on file       Review of Systems   Psychiatric/Behavioral: Positive for agitation, behavioral problems and self-injury  The patient is nervous/anxious  All other systems reviewed and are negative  Objective:  Vitals:    08/04/22 1314   BP: 130/80   BP Location: Left arm   Patient Position: Sitting   Cuff Size: Adult   Pulse: 101   Resp: 16   Temp: 99 7 °F (37 6 °C)   TempSrc: Tympanic   SpO2: 97%   Weight: 60 8 kg (134 lb)   Height: 5' 4" (1 626 m)     Body mass index is 23 kg/m²  Physical Exam  Vitals and nursing note reviewed  Constitutional:       Appearance: Normal appearance  HENT:      Head: Normocephalic  Cardiovascular:      Rate and Rhythm: Normal rate  Musculoskeletal:      Cervical back: Normal range of motion  Skin:     Findings: Bruising, erythema, lesion and rash present  Neurological:      Mental Status: She is alert and oriented to person, place, and time  Psychiatric:         Attention and Perception: Attention normal  She perceives visual hallucinations  Mood and Affect: Mood is anxious  Affect is labile and tearful  Behavior: Behavior is agitated  Judgment: Judgment is impulsive and inappropriate  RESULTS:    No results found for this or any previous visit (from the past 1008 hour(s))  This note was created with voice recognition software  Phonic, grammatical and spelling errors may be present within the note as a result

## 2022-08-09 DIAGNOSIS — R10.33 PERIUMBILICAL PAIN: ICD-10-CM

## 2022-08-09 DIAGNOSIS — E13.42 DIABETIC POLYNEUROPATHY ASSOCIATED WITH OTHER SPECIFIED DIABETES MELLITUS (HCC): ICD-10-CM

## 2022-08-09 DIAGNOSIS — R11.2 NAUSEA AND VOMITING: ICD-10-CM

## 2022-08-09 RX ORDER — PANTOPRAZOLE SODIUM 40 MG/1
40 TABLET, DELAYED RELEASE ORAL 2 TIMES DAILY
Qty: 60 TABLET | Refills: 0 | Status: SHIPPED | OUTPATIENT
Start: 2022-08-09 | End: 2022-10-03 | Stop reason: SDUPTHER

## 2022-08-12 ENCOUNTER — HOSPITAL ENCOUNTER (EMERGENCY)
Facility: HOSPITAL | Age: 37
Discharge: HOME | End: 2022-08-12
Attending: EMERGENCY MEDICINE | Admitting: EMERGENCY MEDICINE
Payer: COMMERCIAL

## 2022-08-12 ENCOUNTER — APPOINTMENT (EMERGENCY)
Dept: CT IMAGING | Facility: HOSPITAL | Age: 37
End: 2022-08-12
Payer: COMMERCIAL

## 2022-08-12 VITALS
TEMPERATURE: 98 F | RESPIRATION RATE: 19 BRPM | OXYGEN SATURATION: 99 % | DIASTOLIC BLOOD PRESSURE: 55 MMHG | HEART RATE: 60 BPM | SYSTOLIC BLOOD PRESSURE: 111 MMHG

## 2022-08-12 DIAGNOSIS — B71.9 TAPEWORM: Primary | ICD-10-CM

## 2022-08-12 LAB
ALBUMIN SERPL BCP-MCNC: 3.9 G/DL (ref 3.5–5)
ALP SERPL-CCNC: 58 U/L (ref 46–116)
ALT SERPL W P-5'-P-CCNC: 18 U/L (ref 12–78)
ANION GAP SERPL CALCULATED.3IONS-SCNC: 10 MMOL/L (ref 4–13)
AST SERPL W P-5'-P-CCNC: 20 U/L (ref 5–45)
BACTERIA UR QL AUTO: ABNORMAL /HPF
BASOPHILS # BLD AUTO: 0.04 THOUSANDS/ΜL (ref 0–0.1)
BASOPHILS NFR BLD AUTO: 1 % (ref 0–1)
BILIRUB SERPL-MCNC: 1.09 MG/DL (ref 0.2–1)
BILIRUB UR QL STRIP: NEGATIVE
BUN SERPL-MCNC: 7 MG/DL (ref 5–25)
CALCIUM SERPL-MCNC: 8.9 MG/DL (ref 8.3–10.1)
CAOX CRY URNS QL MICRO: ABNORMAL /HPF
CHLORIDE SERPL-SCNC: 105 MMOL/L (ref 96–108)
CLARITY UR: ABNORMAL
CO2 SERPL-SCNC: 26 MMOL/L (ref 21–32)
COLOR UR: YELLOW
CREAT SERPL-MCNC: 0.7 MG/DL (ref 0.6–1.3)
EOSINOPHIL # BLD AUTO: 0.07 THOUSAND/ΜL (ref 0–0.61)
EOSINOPHIL NFR BLD AUTO: 1 % (ref 0–6)
ERYTHROCYTE [DISTWIDTH] IN BLOOD BY AUTOMATED COUNT: 16.6 % (ref 11.6–15.1)
EXT PREG TEST URINE: NEGATIVE
EXT. CONTROL ED NAV: NORMAL
GFR SERPL CREATININE-BSD FRML MDRD: 110 ML/MIN/1.73SQ M
GLUCOSE SERPL-MCNC: 169 MG/DL (ref 65–140)
GLUCOSE UR STRIP-MCNC: NEGATIVE MG/DL
HCT VFR BLD AUTO: 38.7 % (ref 34.8–46.1)
HGB BLD-MCNC: 11.9 G/DL (ref 11.5–15.4)
HGB UR QL STRIP.AUTO: NEGATIVE
HYALINE CASTS #/AREA URNS LPF: ABNORMAL /LPF
IMM GRANULOCYTES # BLD AUTO: 0.01 THOUSAND/UL (ref 0–0.2)
IMM GRANULOCYTES NFR BLD AUTO: 0 % (ref 0–2)
KETONES UR STRIP-MCNC: ABNORMAL MG/DL
LEUKOCYTE ESTERASE UR QL STRIP: NEGATIVE
LYMPHOCYTES # BLD AUTO: 1.87 THOUSANDS/ΜL (ref 0.6–4.47)
LYMPHOCYTES NFR BLD AUTO: 35 % (ref 14–44)
MCH RBC QN AUTO: 25.1 PG (ref 26.8–34.3)
MCHC RBC AUTO-ENTMCNC: 30.7 G/DL (ref 31.4–37.4)
MCV RBC AUTO: 82 FL (ref 82–98)
MONOCYTES # BLD AUTO: 0.38 THOUSAND/ΜL (ref 0.17–1.22)
MONOCYTES NFR BLD AUTO: 7 % (ref 4–12)
MUCOUS THREADS UR QL AUTO: ABNORMAL
NEUTROPHILS # BLD AUTO: 3.02 THOUSANDS/ΜL (ref 1.85–7.62)
NEUTS SEG NFR BLD AUTO: 56 % (ref 43–75)
NITRITE UR QL STRIP: NEGATIVE
NON-SQ EPI CELLS URNS QL MICRO: ABNORMAL /HPF
NRBC BLD AUTO-RTO: 0 /100 WBCS
PH UR STRIP.AUTO: 5 [PH]
PLATELET # BLD AUTO: 338 THOUSANDS/UL (ref 149–390)
PMV BLD AUTO: 11.1 FL (ref 8.9–12.7)
POTASSIUM SERPL-SCNC: 3.9 MMOL/L (ref 3.5–5.3)
PROT SERPL-MCNC: 7.6 G/DL (ref 6.4–8.4)
PROT UR STRIP-MCNC: ABNORMAL MG/DL
RBC # BLD AUTO: 4.74 MILLION/UL (ref 3.81–5.12)
RBC #/AREA URNS AUTO: ABNORMAL /HPF
SODIUM SERPL-SCNC: 141 MMOL/L (ref 135–147)
SP GR UR STRIP.AUTO: >=1.03 (ref 1–1.03)
UROBILINOGEN UR QL STRIP.AUTO: 1 E.U./DL
WBC # BLD AUTO: 5.39 THOUSAND/UL (ref 4.31–10.16)
WBC #/AREA URNS AUTO: ABNORMAL /HPF

## 2022-08-12 PROCEDURE — 99283 EMERGENCY DEPT VISIT LOW MDM: CPT

## 2022-08-12 PROCEDURE — 85025 COMPLETE CBC W/AUTO DIFF WBC: CPT | Performed by: EMERGENCY MEDICINE

## 2022-08-12 PROCEDURE — 81001 URINALYSIS AUTO W/SCOPE: CPT | Performed by: EMERGENCY MEDICINE

## 2022-08-12 PROCEDURE — 81025 URINE PREGNANCY TEST: CPT | Performed by: EMERGENCY MEDICINE

## 2022-08-12 PROCEDURE — 71250 CT THORAX DX C-: CPT

## 2022-08-12 PROCEDURE — 74176 CT ABD & PELVIS W/O CONTRAST: CPT

## 2022-08-12 PROCEDURE — 99282 EMERGENCY DEPT VISIT SF MDM: CPT | Performed by: EMERGENCY MEDICINE

## 2022-08-12 PROCEDURE — 36415 COLL VENOUS BLD VENIPUNCTURE: CPT | Performed by: EMERGENCY MEDICINE

## 2022-08-12 PROCEDURE — 80053 COMPREHEN METABOLIC PANEL: CPT | Performed by: EMERGENCY MEDICINE

## 2022-08-12 RX ORDER — ALBENDAZOLE 200 MG/1
400 TABLET, FILM COATED ORAL ONCE
Status: COMPLETED | OUTPATIENT
Start: 2022-08-12 | End: 2022-08-12

## 2022-08-12 RX ORDER — ALBENDAZOLE 200 MG/1
600 TABLET, FILM COATED ORAL ONCE
Status: DISCONTINUED | OUTPATIENT
Start: 2022-08-12 | End: 2022-08-12

## 2022-08-12 RX ORDER — ALBENDAZOLE 200 MG/1
600 TABLET, FILM COATED ORAL 2 TIMES DAILY
Qty: 60 TABLET | Refills: 0 | Status: SHIPPED | OUTPATIENT
Start: 2022-08-12 | End: 2022-08-22

## 2022-08-12 RX ORDER — IVERMECTIN 3 MG/1
TABLET ORAL ONCE
Refills: 0 | Status: CANCELLED | OUTPATIENT
Start: 2022-08-12 | End: 2022-08-12

## 2022-08-12 RX ADMIN — ALBENDAZOLE 400 MG: 200 TABLET, FILM COATED ORAL at 15:15

## 2022-08-12 NOTE — ED PROVIDER NOTES
History  Chief Complaint   Patient presents with    Itching     Itching to body x 15months  Started on neck and head, now on feet and hands  Seen by dermatology, no relief  Pt also reports finding a worm in her stool and now states she has "worms coming out of her mouth and eyes" and she can feel them crawling in her body  Has an appt with ID next week  Pt is tearful and anxious  Pt states 15 months of itching  States she has been diagnosed a parasitic infection  She states defecating a tape worm  She has multiple pictures which he claims are small worms coming out of various orifices of the body  She states that she has been told that she needs to be treated by Infectious Disease  She admits to her psychiatric history, states she is taking medications as directed  History provided by:  Patient   used: No    Medical Problem  Severity:  Mild  Onset quality:  Gradual  Timing:  Constant  Progression:  Worsening  Chronicity:  New  Associated symptoms: no abdominal pain, no congestion, no diarrhea, no fever and no loss of consciousness        Prior to Admission Medications   Prescriptions Last Dose Informant Patient Reported? Taking?    ALPRAZolam (XANAX) 0 5 mg tablet  Self Yes No   Sig: TAKE ONE TABLET BY MOUTH THREE TIMES A DAY ( 2 WEEK SUPPLY )   Cyanocobalamin (B-12) 1000 MCG SUBL  Self Yes No   Sig: PLACE ONE TABLET UNDER THE TONGUE EVERY DAY   Linzess 72 MCG CAPS   No No   Sig: TAKE ONE CAPSULE BY MOUTH EVERY DAY BEFORE BREAKFAST   Lyrica 200 MG capsule   No No   Sig: Take 1 capsule (200 mg total) by mouth 2 (two) times a day   Multiple Vitamin (MULTI-VITAMIN PO)  Self Yes No   Sig: Take by mouth   Noritate 1 % cream   Yes No   Sig: APPLY TO AFFECTED AREA DAILY FOR 10 DAYS   betamethasone dipropionate (DIPROSONE) 0 05 % cream  Self No No   Sig: Apply topically 2 (two) times a day   busPIRone (BUSPAR) 5 mg tablet  Self Yes No   Sig: TAKE ONE TABLET BY MOUTH THREE TIMES A DAY ( 2 WEEK SUPPLY )   doxepin (SINEquan) 50 mg capsule  Self Yes No   Sig: TAKE ONE CAPSULE BY MOUTH AT BEDTIME ( 2 WEEK SUPPLY )   ergocalciferol (VITAMIN D2) 50,000 units  Self Yes No   Sig: TAKE 1 CAPSULE BY MOUTH ONE TIME PER WEEK   ferrous sulfate 325 (65 Fe) mg tablet  Self Yes No   Sig: TAKE 1 TABLET BY MOUTH TWICE A DAY WITH A MEAL AND VITAMIN C 250 MG   gatifloxacin (ZYMAXID) 0 5 %  Self Yes No   Sig: INSTILL 1 DROP INTO RIGHT EYE FOUR TIMES A DAY   hydrOXYzine HCL (ATARAX) 10 mg tablet  Self No No   Sig: Take 1 tablet (10 mg total) by mouth every 6 (six) hours as needed for itching   lamoTRIgine (LaMICtal) 100 mg tablet  Self Yes No   Sig: Take 100 mg by mouth daily at bedtime   lamoTRIgine (LaMICtal) 25 mg tablet  Self Yes No   Sig: TAKE ONE TABLET DAILY FOR 7 DAYS AT BEDTIME FOR 7 DAYS THEN TAKE 2 TABLETS AT BEDTIME FOR 7 DAYS   metroNIDAZOLE (METROGEL) 0 75 % gel   Yes No   Sig: APPLY TO CLEAN HEALED SKIN ONCE DAILY ( DO NOT USE UNTIL SKIN HAS COMPLETELY HEALSED  )   mometasone (ELOCON) 0 1 % cream   Yes No   Sig: APPLY TO AFFECTED AREAS ON FACE TWICE DAILY   mupirocin (BACTROBAN) 2 % ointment   Yes No   Sig: APPLY TO ALL AREAS OF SORE/PICKED/RUBBED SKIN TWICE DAILY FOR 2 TO 3 WEEKS     pantoprazole (PROTONIX) 40 mg tablet   No No   Sig: Take 1 tablet (40 mg total) by mouth 2 (two) times a day      Facility-Administered Medications: None       Past Medical History:   Diagnosis Date    Anemia     iron def    Chronic pain disorder     feet    Depression     Diabetes mellitus ([facility] )     neg since gastric bypass    GERD (gastroesophageal reflux disease)     Gilbert's syndrome     Neuropathy     feet sheila    Vitamin D deficiency        Past Surgical History:   Procedure Laterality Date     SECTION      X3    CHOLECYSTECTOMY      GASTRIC BYPASS      HERNIA REPAIR      [place] EXCISE EXCESS SKIN TISSUE,ABDOMEN N/A 2020    Procedure: Nancy Jorge;  Surgeon: Bertha Shirley MD; Location:  MAIN OR;  Service: Plastics       Family History   Problem Relation Age of Onset    Mental illness Mother     Drug abuse Mother      I have reviewed and agree with the history as documented  E-Cigarette/Vaping    E-Cigarette Use Never User      E-Cigarette/Vaping Substances    Nicotine No     THC No     CBD No     Flavoring No     Other No     Unknown No      Social History     Tobacco Use    Smoking status: Former Smoker     Packs/day: 0 50     Years: 5 00     Pack years: 2 50     Types: Cigarettes    Smokeless tobacco: Never Used   Vaping Use    Vaping Use: Never used   Substance Use Topics    Alcohol use: Not Currently     Alcohol/week: 0 0 standard drinks     Comment: once a month    Drug use: Never       Review of Systems   Constitutional: Negative for fever  HENT: Negative for congestion  Gastrointestinal: Negative for abdominal pain and diarrhea  Neurological: Negative for loss of consciousness  All other systems reviewed and are negative  Physical Exam  Physical Exam  Vitals and nursing note reviewed  Constitutional:       Appearance: She is well-developed  HENT:      Head: Normocephalic and atraumatic  Right Ear: External ear normal       Left Ear: External ear normal    Eyes:      Conjunctiva/sclera: Conjunctivae normal    Neck:      Thyroid: No thyromegaly  Vascular: No JVD  Trachea: No tracheal deviation  Cardiovascular:      Rate and Rhythm: Normal rate  Pulmonary:      Effort: Pulmonary effort is normal       Breath sounds: Normal breath sounds  No stridor  Abdominal:      General: There is no distension  Palpations: Abdomen is soft  There is no mass  Tenderness: There is no abdominal tenderness  There is no guarding  Hernia: No hernia is present  Musculoskeletal:         General: No tenderness or deformity  Normal range of motion  Lymphadenopathy:      Cervical: No cervical adenopathy     Skin:     General: Skin is warm       Coloration: Skin is not pale  Findings: No erythema or rash  Neurological:      Mental Status: She is alert and oriented to person, place, and time  Psychiatric:         Behavior: Behavior normal          Vital Signs  ED Triage Vitals [08/12/22 1110]   Temperature Pulse Respirations Blood Pressure SpO2   98 °F (36 7 °C) 88 19 127/65 99 %      Temp Source Heart Rate Source Patient Position - Orthostatic VS BP Location FiO2 (%)   Tympanic -- -- -- --      Pain Score       --           Vitals:    08/12/22 1110   BP: 127/65   Pulse: 88         Visual Acuity      ED Medications  Medications - No data to display    Diagnostic Studies  Results Reviewed     None                 No orders to display              Procedures  Procedures         ED Course                                             MDM      Disposition  Final diagnoses:   None     ED Disposition     None      Follow-up Information    None         Patient's Medications   Discharge Prescriptions    No medications on file       No discharge procedures on file      PDMP Review       Value Time User    PDMP Reviewed  Yes 8/9/2022 11:38 AM Raquel Martin MD          ED Provider  Electronically Signed by           Val Stockton DO  08/12/22 2610

## 2022-08-12 NOTE — DISCHARGE INSTRUCTIONS
As we discussed, I am treating your infection based on the picture you showed me    IF you show no improvement please follow up with your psychiatrist

## 2022-08-12 NOTE — ED NOTES
Medication still unavailable by pharmacy  Pt aware and willing to wait        Linda Zapata RN  08/12/22 0462

## 2022-08-16 ENCOUNTER — OFFICE VISIT (OUTPATIENT)
Dept: DERMATOLOGY | Facility: CLINIC | Age: 37
End: 2022-08-16
Payer: COMMERCIAL

## 2022-08-16 VITALS — BODY MASS INDEX: 21.85 KG/M2 | HEIGHT: 64 IN | WEIGHT: 128 LBS

## 2022-08-16 DIAGNOSIS — T07.XXXA MULTIPLE EXCORIATIONS: Primary | ICD-10-CM

## 2022-08-16 PROCEDURE — 99213 OFFICE O/P EST LOW 20 MIN: CPT | Performed by: DERMATOLOGY

## 2022-08-16 NOTE — PATIENT INSTRUCTIONS
[facility] Brief Operative Note    Pre-operative diagnosis: FOLLOW UP FOR POLYPS   Post-operative diagnosis polyps     Procedure: Procedure(s):  COMBINED COLONOSCOPY, REMOVE TUMOR/POLYP/LESION BY SNARE  COMBINED COLONOSCOPY, SUBMUCOSAL INJECTION/TATTOO   Surgeon(s): Surgeon(s) and Role:     * Eugene Burden MD - Primary   Estimated blood loss: * No values recorded between 11/29/2018 12:00 AM and 11/29/2018 11:57 AM *    Specimens:   ID Type Source Tests Collected by Time Destination   A :  Polyp Large Intestine, Hepatic Flexure SURGICAL PATHOLOGY EXAM Whitley Diaz RN 11/29/2018 11:27 AM    B : proximal transverse Polyp Large Intestine, Transverse SURGICAL PATHOLOGY EXAM Whitley Diaz RN 11/29/2018 11:46 AM       Findings: Please see ProVation procedure note in Chart Review      Patient continues to have thoughts that there are bugs and insects that are causing this problem do not see any primary process that can confirm this  Tried to explain to patient that no infestation we know a would cause these type of lesions  Patient advised to discuss this further with her psychiatrist to alleviate some of her stress that is going on

## 2022-08-16 NOTE — PROGRESS NOTES
[facility] DERMATOLOG[facility]  [address] [place] [id number]  [phone]  [phone]     MRN: [id number] : 1985  Encounter: [id number]  Patient Information: Wilder Mcadams  Chief complaint:  Skin irritation worms coming out from skin from her mouth eyes ears scalp  History of present illness:  57-year-old female presents for follow-up for continued concerned regarding potential for insect or warms that are coming out of her skin now patient thinks she has myasis and has been picking at areas on her scalp  Patient shows a photograph of what appears to be a threaded material which he found in her stool  Patient has seen by ER physician elected to treat her for pinworm also went ahead and seen by he Dr Paulo Infante who is nurse said he she noted something in her ears    Past Medical History:   Diagnosis Date    Anemia     iron def    Chronic pain disorder     feet    Depression     Diabetes mellitus (HCC)     neg since gastric bypass    GERD (gastroesophageal reflux disease)     Gilbert's syndrome     Neuropathy     feet sheila    Vitamin D deficiency      Past Surgical History:   Procedure Laterality Date     SECTION      X3    CHOLECYSTECTOMY      GASTRIC BYPASS      HERNIA REPAIR      [place] EXCISE EXCESS SKIN TISSUE,ABDOMEN N/A 2020    Procedure: ABDOMINALPLASTY,CIRCUMFERENTIAL;  Surgeon: Nani Vasques MD;  Location:  MAIN OR;  Service: Plastics     Social History   Social History     Substance and Sexual Activity   Alcohol Use Not Currently    Alcohol/week: 0 0 standard drinks    Comment: once a month     Social History     Substance and Sexual Activity   Drug Use Never     Social History     Tobacco Use   Smoking Status Former Smoker    Packs/day: 0 50    Years: 5 00    Pack years: 2 50    Types: Cigarettes   Smokeless Tobacco Never Used     Family History   Problem Relation Age of Onset    Mental illness Mother     Drug abuse Mother      Meds/Allergies   No Known Allergies    Meds:  Prior to Admission medications    Medication Sig Start Date End Date Taking?  Authorizing Provider   albendazole (ALBENZA) 200 mg tablet Take 3 tablets (600 mg total) by mouth 2 (two) times a day for 10 days 8/12/22 8/22/22 Yes Machelle Downing DO   ALPRAZolam [facility]) 0 5 mg tablet TAKE ONE TABLET BY MOUTH THREE TIMES A DAY ( 2 WEEK SUPPLY ) 4/27/22  Yes Historical Provider, MD   betamethasone dipropionate (DIPROSONE) 0 05 % cream Apply topically 2 (two) times a day 3/16/22  Yes Flora Villanueva MD   busPIRone (BUSPAR) 5 mg tablet TAKE ONE TABLET BY MOUTH THREE TIMES A DAY ( 2 WEEK SUPPLY ) 4/27/22  Yes Historical Provider, MD   Cyanocobalamin (B-12) 1000 MCG SUBL PLACE ONE TABLET UNDER THE TONGUE EVERY DAY 1/3/22  Yes Historical Provider, MD   doxepin (SINEquan) 50 mg capsule TAKE ONE CAPSULE BY MOUTH AT BEDTIME ( 2 WEEK SUPPLY ) 4/27/22  Yes Historical Provider, MD   ergocalciferol (VITAMIN D2) 50,000 units TAKE 1 CAPSULE BY MOUTH ONE TIME PER WEEK 12/21/19  Yes Historical Provider, MD   ferrous sulfate 325 (65 Fe) mg tablet TAKE 1 TABLET BY MOUTH TWICE A DAY WITH A MEAL AND VITAMIN C 250 MG 12/16/19  Yes Historical Provider, MD   gatifloxacin (ZYMAXID) 0 5 % INSTILL 1 DROP INTO RIGHT EYE FOUR TIMES A DAY 3/2/22  Yes Historical Provider, MD   hydrOXYzine HCL (ATARAX) 10 mg tablet Take 1 tablet (10 mg total) by mouth every 6 (six) hours as needed for itching 3/16/22  Yes Flora Villanueva MD   lamoTRIgine (LaMICtal) 100 mg tablet Take 100 mg by mouth daily at bedtime 4/14/22  Yes Historical Provider, MD   lamoTRIgine (LaMICtal) 25 mg tablet TAKE ONE TABLET DAILY FOR 7 DAYS AT BEDTIME FOR 7 DAYS THEN TAKE 2 TABLETS AT BEDTIME FOR 7 DAYS 3/31/22  Yes Historical Provider, MD   Linzess 72 MCG CAPS TAKE ONE CAPSULE BY MOUTH EVERY DAY BEFORE BREAKFAST 7/25/22  Yes Josiah Montelongo PA-C   Lyrica 200 MG capsule Take 1 capsule (200 mg total) by mouth 2 (two) times a day 8/9/22 9/8/22 Yes Jabari Andino MD   metroNIDAZOLE (METROGEL) 0 75 % gel APPLY TO CLEAN HEALED SKIN ONCE DAILY ( DO NOT USE UNTIL SKIN HAS COMPLETELY HEALSED  ) 6/8/22  Yes Historical Provider, MD   mometasone (ELOCON) 0 1 % cream APPLY TO AFFECTED AREAS ON FACE TWICE DAILY 6/9/22  Yes Historical Provider, MD   Multiple Vitamin (MULTI-VITAMIN PO) Take by mouth   Yes Historical Provider, MD   mupirocin (BACTROBAN) 2 % ointment APPLY TO ALL AREAS OF SORE/PICKED/RUBBED SKIN TWICE DAILY FOR 2 TO 3 WEEKS  6/8/22  Yes Historical Provider, MD   Noritate 1 % cream APPLY TO AFFECTED AREA DAILY FOR 10 DAYS 6/10/22  Yes Historical Provider, MD   pantoprazole (PROTONIX) 40 mg tablet Take 1 tablet (40 mg total) by mouth 2 (two) times a day 8/9/22  Yes Penny Oliver PA-C       Subjective:     Review of Systems:    General: negative for - chills, fatigue, fever,  weight gain or weight loss  Psychological: negative for - anxiety, behavioral disorder, concentration difficulties, decreased libido, depression, irritability, memory difficulties, mood swings, sleep disturbances or suicidal ideation  ENT: negative for - hearing difficulties , nasal congestion, nasal discharge, oral lesions, sinus pain, sneezing, sore throat  Allergy and Immunology: negative for - hives, insect bite sensitivity,  Hematological and Lymphatic: negative for - bleeding problems, blood clots,bruising, swollen lymph nodes  Endocrine: negative for - hair pattern changes, hot flashes, malaise/lethargy, mood swings, palpitations, polydipsia/polyuria, skin changes, temperature intolerance or unexpected weight change  Respiratory: negative for - cough, hemoptysis, orthopnea, shortness of breath, or wheezing  Cardiovascular: negative for - chest pain, dyspnea on exertion, edema,  Gastrointestinal: negative for - abdominal pain, nausea/vomiting  Genito-Urinary: negative for - dysuria, incontinence, irregular/heavy menses or urinary frequency/urgency  Musculoskeletal: negative for - gait disturbance, joint pain, joint stiffness, joint swelling, muscle pain, muscular weakness  Dermatological:  As in HPI  Neurological: negative for confusion, dizziness, headaches, impaired coordination/balance, memory loss, numbness/tingling, seizures, speech problems, tremors or weakness       Objective:   Ht 5' 4" (1 626 m)   Wt 58 1 kg (128 lb)   BMI 21 97 kg/m²     Physical Exam:    General Appearance:    Alert, cooperative, no distress   Head:    Normocephalic, without obvious abnormality, atraumatic           Skin:   A full skin exam was performed including scalp, head scalp, eyes, ears, nose, lips, neck, chest, axilla, abdomen, back, buttocks, bilateral upper extremities, bilateral lower extremities, hands, feet, fingers, toes, fingernails, and toenails numerous excoriations manipulated skin without signs of primary lesions     Assessment:     1  Multiple excoriations           Plan:     Jose Mayer MD  6/22/8013,8:76 PM    Portions of the record may have been created with voice recognition software   Occasional wrong word or "sound a like" substitutions may have occurred due to the inherent limitations of voice recognition software   Read the chart carefully and recognize, using context, where substitutions have occurred

## 2022-09-06 DIAGNOSIS — E13.42 DIABETIC POLYNEUROPATHY ASSOCIATED WITH OTHER SPECIFIED DIABETES MELLITUS (HCC): ICD-10-CM

## 2022-09-27 DIAGNOSIS — K21.9 GASTROESOPHAGEAL REFLUX DISEASE, UNSPECIFIED WHETHER ESOPHAGITIS PRESENT: Primary | ICD-10-CM

## 2022-09-28 RX ORDER — SUCRALFATE ORAL 1 G/10ML
SUSPENSION ORAL
Qty: 414 ML | Refills: 1 | Status: SHIPPED | OUTPATIENT
Start: 2022-09-28

## 2022-10-03 DIAGNOSIS — E13.42 DIABETIC POLYNEUROPATHY ASSOCIATED WITH OTHER SPECIFIED DIABETES MELLITUS (HCC): ICD-10-CM

## 2022-10-03 DIAGNOSIS — R10.33 PERIUMBILICAL PAIN: ICD-10-CM

## 2022-10-03 DIAGNOSIS — R11.2 NAUSEA AND VOMITING: ICD-10-CM

## 2022-10-03 RX ORDER — PANTOPRAZOLE SODIUM 40 MG/1
40 TABLET, DELAYED RELEASE ORAL 2 TIMES DAILY
Qty: 60 TABLET | Refills: 0 | Status: SHIPPED | OUTPATIENT
Start: 2022-10-03 | End: 2022-10-28 | Stop reason: SDUPTHER

## 2022-10-28 DIAGNOSIS — R11.2 NAUSEA AND VOMITING: ICD-10-CM

## 2022-10-28 DIAGNOSIS — R10.33 PERIUMBILICAL PAIN: ICD-10-CM

## 2022-10-28 DIAGNOSIS — E13.42 DIABETIC POLYNEUROPATHY ASSOCIATED WITH OTHER SPECIFIED DIABETES MELLITUS (HCC): ICD-10-CM

## 2022-10-28 RX ORDER — PANTOPRAZOLE SODIUM 40 MG/1
40 TABLET, DELAYED RELEASE ORAL 2 TIMES DAILY
Qty: 60 TABLET | Refills: 0 | Status: SHIPPED | OUTPATIENT
Start: 2022-10-28

## 2022-11-15 ENCOUNTER — TELEPHONE (OUTPATIENT)
Dept: INTERNAL MEDICINE CLINIC | Facility: CLINIC | Age: 37
End: 2022-11-15

## 2022-11-17 ENCOUNTER — OFFICE VISIT (OUTPATIENT)
Dept: INTERNAL MEDICINE CLINIC | Facility: CLINIC | Age: 37
End: 2022-11-17

## 2022-11-17 VITALS
BODY MASS INDEX: 23.05 KG/M2 | HEIGHT: 64 IN | HEART RATE: 62 BPM | SYSTOLIC BLOOD PRESSURE: 100 MMHG | OXYGEN SATURATION: 100 % | DIASTOLIC BLOOD PRESSURE: 70 MMHG | RESPIRATION RATE: 12 BRPM | WEIGHT: 135 LBS

## 2022-11-17 DIAGNOSIS — F31.9 BIPOLAR AFFECTIVE DISORDER, REMISSION STATUS UNSPECIFIED (HCC): ICD-10-CM

## 2022-11-17 DIAGNOSIS — R21 RASH AND NONSPECIFIC SKIN ERUPTION: ICD-10-CM

## 2022-11-17 DIAGNOSIS — F22 DELUSIONAL DISORDER (HCC): ICD-10-CM

## 2022-11-17 DIAGNOSIS — E11.9 CONTROLLED TYPE 2 DIABETES MELLITUS WITHOUT COMPLICATION, WITHOUT LONG-TERM CURRENT USE OF INSULIN (HCC): ICD-10-CM

## 2022-11-17 DIAGNOSIS — B88.0 INFESTATION BY DEMODEX: ICD-10-CM

## 2022-11-17 DIAGNOSIS — R19.5 LOOSE BOWEL MOVEMENT: Primary | ICD-10-CM

## 2022-11-17 RX ORDER — MAGNESIUM 200 MG
TABLET ORAL
Status: CANCELLED | OUTPATIENT
Start: 2022-11-17

## 2022-11-17 RX ORDER — ERGOCALCIFEROL 1.25 MG/1
CAPSULE ORAL
Refills: 0 | Status: CANCELLED | OUTPATIENT
Start: 2022-11-17

## 2022-11-17 RX ORDER — LAMOTRIGINE 25 MG/1
25 TABLET ORAL DAILY
Qty: 30 TABLET | Refills: 2 | Status: SHIPPED | OUTPATIENT
Start: 2022-11-17 | End: 2023-02-15

## 2022-11-17 RX ORDER — DOXEPIN HYDROCHLORIDE 50 MG/1
50 CAPSULE ORAL
Qty: 30 CAPSULE | Refills: 2 | Status: SHIPPED | OUTPATIENT
Start: 2022-11-17 | End: 2023-02-15

## 2022-11-17 RX ORDER — HYDROXYZINE HYDROCHLORIDE 10 MG/1
10 TABLET, FILM COATED ORAL EVERY 6 HOURS PRN
Qty: 60 TABLET | Refills: 2 | Status: SHIPPED | OUTPATIENT
Start: 2022-11-17

## 2022-11-17 RX ORDER — LAMOTRIGINE 100 MG/1
100 TABLET ORAL
Qty: 30 TABLET | Refills: 2 | Status: SHIPPED | OUTPATIENT
Start: 2022-11-17 | End: 2023-02-15

## 2022-11-17 RX ORDER — METRONIDAZOLE 7.5 MG/G
GEL TOPICAL 2 TIMES DAILY
Qty: 45 G | Refills: 0 | Status: SHIPPED | OUTPATIENT
Start: 2022-11-17 | End: 2023-02-15

## 2022-11-17 NOTE — PROGRESS NOTES
Assessment/Plan:     Concepción Woo is here and is paranoid and delusional  She has shaved her head and her hair is growing again; she says she has a large wound on her scalp but I reassured her there is nothing there  She insists there are worms crawling out of her skin and ears; she appears ill; she has stopped taking her medications like Lamictal, doxepin, Buspar; she follows with Dr Katarina Reyes  She promised me to find a new psychiatrist and start her medications again if I test her stool for parasites  Refilled her med's; will plan to see each other in 1 month  Quality Measures:       Return in about 4 weeks (around 12/15/2022)  No problem-specific Assessment & Plan notes found for this encounter  Diagnoses and all orders for this visit:    Loose bowel movement  -     White Blood Cells, Stool by Gram Stain; Future  -     Stool culture; Future    Rash and nonspecific skin eruption  -     hydrOXYzine HCL (ATARAX) 10 mg tablet; Take 1 tablet (10 mg total) by mouth every 6 (six) hours as needed for itching    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  -     hydrOXYzine HCL (ATARAX) 10 mg tablet; Take 1 tablet (10 mg total) by mouth every 6 (six) hours as needed for itching  -     metroNIDAZOLE (METROGEL) 0 75 % gel; Apply topically 2 (two) times a day    Bipolar affective disorder, remission status unspecified ([facility] )  -     Ambulatory Referral to Psychiatry; Future  -     TSH, 3rd generation with Free T4 reflex; Future  -     lamoTRIgine (LaMICtal) 100 mg tablet; Take 1 tablet (100 mg total) by mouth daily at bedtime  -     lamoTRIgine (LaMICtal) 25 mg tablet; Take 1 tablet (25 mg total) by mouth daily  -     doxepin (SINEquan) 50 mg capsule; Take 1 capsule (50 mg total) by mouth daily at bedtime    Controlled type 2 diabetes mellitus without complication, without long-term current use of insulin ([facility])  -     CBC and differential; Future  -     Comprehensive metabolic panel;  Future  -     Lipid Panel with Direct LDL reflex; Future  -     Hemoglobin A1C; Future    Delusional disorder ([facility] )  -     Toxicology screen, urine  -     lamoTRIgine (LaMICtal) 100 mg tablet; Take 1 tablet (100 mg total) by mouth daily at bedtime  -     lamoTRIgine (LaMICtal) 25 mg tablet; Take 1 tablet (25 mg total) by mouth daily  -     doxepin (SINEquan) 50 mg capsule; Take 1 capsule (50 mg total) by mouth daily at bedtime          Subjective:      Patient ID: Raul Mckeon is a 42 y o  female  Here with rash and lesions        ALLERGIES:  No Known Allergies    CURRENT MEDICATIONS:    Current Outpatient Medications:   •  ALPRAZolam (XANAX) 0 5 mg tablet, TAKE ONE TABLET BY MOUTH THREE TIMES A DAY ( 2 WEEK SUPPLY ), Disp: , Rfl:   •  Cyanocobalamin (B-12) 1000 MCG SUBL, PLACE ONE TABLET UNDER THE TONGUE EVERY DAY, Disp: , Rfl:   •  doxepin (SINEquan) 50 mg capsule, Take 1 capsule (50 mg total) by mouth daily at bedtime, Disp: 30 capsule, Rfl: 2  •  ergocalciferol (VITAMIN D2) 50,000 units, TAKE 1 CAPSULE BY MOUTH ONE TIME PER WEEK, Disp: , Rfl: 0  •  gatifloxacin (ZYMAXID) 0 5 %, INSTILL 1 DROP INTO RIGHT EYE FOUR TIMES A DAY, Disp: , Rfl:   •  hydrOXYzine HCL (ATARAX) 10 mg tablet, Take 1 tablet (10 mg total) by mouth every 6 (six) hours as needed for itching, Disp: 60 tablet, Rfl: 2  •  lamoTRIgine (LaMICtal) 100 mg tablet, Take 1 tablet (100 mg total) by mouth daily at bedtime, Disp: 30 tablet, Rfl: 2  •  lamoTRIgine (LaMICtal) 25 mg tablet, Take 1 tablet (25 mg total) by mouth daily, Disp: 30 tablet, Rfl: 2  •  Lyrica 200 MG capsule, Take 1 capsule (200 mg total) by mouth 2 (two) times a day, Disp: 60 capsule, Rfl: 0  •  metroNIDAZOLE (METROGEL) 0 75 % gel, Apply topically 2 (two) times a day, Disp: 45 g, Rfl: 0  •  Multiple Vitamin (MULTI-VITAMIN PO), Take by mouth, Disp: , Rfl:   •  pantoprazole (PROTONIX) 40 mg tablet, Take 1 tablet (40 mg total) by mouth 2 (two) times a day, Disp: 60 tablet, Rfl: 0  •  sucralfate (CARAFATE) 1 g/10 mL suspension, TAKE 10ML (1GRAM)  BY MOUTH FOUR TIMES A DAY WITH MEALS AND AT BEDTIME, Disp: 414 mL, Rfl: 1    ACTIVE PROBLEM LIST:  Patient Active Problem List   Diagnosis   • Encounter for physical examination related to employment   • Vitamin D deficiency   • Gilbert's syndrome   • Bipolar affective ([facility] )   • Controlled type 2 diabetes mellitus without complication, without long-term current use of insulin ([facility] )   • Diabetic polyneuropathy ([facility] )   • Lipodystrophy       PAST MEDICAL HISTORY:  Past Medical History:   Diagnosis Date   • Anemia     iron def   • Chronic pain disorder     feet   • Depression    • Diabetes mellitus ([facility] )     neg since gastric bypass   • GERD (gastroesophageal reflux disease)    • Gilbert's syndrome    • Neuropathy     feet sheila   • Vitamin D deficiency        PAST SURGICAL HISTORY:  Past Surgical History:   Procedure Laterality Date   •  SECTION      X3   • CHOLECYSTECTOMY     • GASTRIC BYPASS     • HERNIA REPAIR     • [place] EXCISE EXCESS SKIN TISSUE,ABDOMEN N/A 2020    Procedure: ABDOMINALPLASTY,CIRCUMFERENTIAL;  Surgeon: Laurent Garcia MD;  Location: [address] [place] [address];  Service: Plastics       FAMILY HISTORY:  Family History   Problem Relation Age of Onset   • Mental illness Mother    • Drug abuse Mother        SOCIAL HISTORY:  Social History     Socioeconomic History   • Marital status: /Civil Union     Spouse name: Not on file   • Number of children: Not on file   • Years of education: Not on file   • Highest education level: Not on file   Occupational History   • Occupation:    Tobacco Use   • Smoking status: Former     Packs/day: 0 50     Years: 5 00     Pack years: 2 50     Types: Cigarettes   • Smokeless tobacco: Never   Vaping Use   • Vaping Use: Never used   Substance and Sexual Activity   • Alcohol use: Not Currently     Alcohol/week: 0 0 standard drinks     Comment: once a month   • Drug use: Never   • Sexual activity: Not Currently     Partners: Male     Birth control/protection: None   Other Topics Concern   • Not on file   Social History Narrative   • Not on file     Social Determinants of Health     Financial Resource Strain: Not on file   Food Insecurity: Not on file   Transportation Needs: Not on file   Physical Activity: Not on file   Stress: Not on file   Social Connections: Not on file   Intimate Partner Violence: Not on file   Housing Stability: Not on file       Review of Systems   Skin: Positive for color change, pallor and wound  Psychiatric/Behavioral: Positive for self-injury, sleep disturbance and suicidal ideas  The patient is nervous/anxious  All other systems reviewed and are negative  Objective:  Vitals:    11/17/22 1149   BP: 100/70   BP Location: Left arm   Patient Position: Sitting   Pulse: 62   Resp: 12   SpO2: 100%   Weight: 61 2 kg (135 lb)   Height: 5' 4" (1 626 m)     Body mass index is 23 17 kg/m²  Physical Exam  Vitals and nursing note reviewed  Constitutional:       Appearance: Normal appearance  HENT:      Head: Normocephalic and atraumatic  Cardiovascular:      Rate and Rhythm: Normal rate  Pulmonary:      Effort: Pulmonary effort is normal    Musculoskeletal:      Cervical back: Normal range of motion  Skin:     General: Skin is warm  Coloration: Skin is pale  Findings: Bruising, erythema, lesion and rash present  Neurological:      General: No focal deficit present  Mental Status: She is alert and oriented to person, place, and time  Mental status is at baseline  Psychiatric:         Attention and Perception: She perceives visual hallucinations  Mood and Affect: Mood is anxious and depressed  Affect is labile and tearful  Behavior: Behavior is agitated, aggressive and hyperactive  Thought Content: Thought content is paranoid and delusional          Judgment: Judgment is impulsive and inappropriate             RESULTS:    No results found for this or any previous visit (from the past 1008 hour(s))  This note was created with voice recognition software  Phonic, grammatical and spelling errors may be present within the note as a result

## 2022-11-21 ENCOUNTER — TELEPHONE (OUTPATIENT)
Dept: PSYCHIATRY | Facility: CLINIC | Age: 37
End: 2022-11-21

## 2022-11-22 DIAGNOSIS — E13.42 DIABETIC POLYNEUROPATHY ASSOCIATED WITH OTHER SPECIFIED DIABETES MELLITUS (HCC): ICD-10-CM

## 2022-12-08 ENCOUNTER — APPOINTMENT (OUTPATIENT)
Dept: LAB | Facility: CLINIC | Age: 37
End: 2022-12-08

## 2022-12-08 DIAGNOSIS — R19.5 LOOSE BOWEL MOVEMENT: ICD-10-CM

## 2022-12-09 LAB
CAMPYLOBACTER DNA SPEC NAA+PROBE: NORMAL
SALMONELLA DNA SPEC QL NAA+PROBE: NORMAL
SHIGA TOXIN STX GENE SPEC NAA+PROBE: NORMAL
SHIGELLA DNA SPEC QL NAA+PROBE: NORMAL

## 2022-12-16 DIAGNOSIS — E13.42 DIABETIC POLYNEUROPATHY ASSOCIATED WITH OTHER SPECIFIED DIABETES MELLITUS (HCC): ICD-10-CM

## 2022-12-16 DIAGNOSIS — B88.0 INFESTATION BY DEMODEX: ICD-10-CM

## 2022-12-16 DIAGNOSIS — R11.2 NAUSEA AND VOMITING: ICD-10-CM

## 2022-12-16 DIAGNOSIS — R21 RASH AND NONSPECIFIC SKIN ERUPTION: ICD-10-CM

## 2022-12-16 DIAGNOSIS — R10.33 PERIUMBILICAL PAIN: ICD-10-CM

## 2022-12-16 RX ORDER — PANTOPRAZOLE SODIUM 40 MG/1
40 TABLET, DELAYED RELEASE ORAL 2 TIMES DAILY
Qty: 60 TABLET | Refills: 0 | Status: SHIPPED | OUTPATIENT
Start: 2022-12-16

## 2022-12-16 RX ORDER — METRONIDAZOLE 7.5 MG/G
GEL TOPICAL 2 TIMES DAILY
Qty: 45 G | Refills: 0 | Status: SHIPPED | OUTPATIENT
Start: 2022-12-16 | End: 2023-03-16

## 2022-12-16 RX ORDER — HYDROXYZINE HYDROCHLORIDE 10 MG/1
10 TABLET, FILM COATED ORAL EVERY 6 HOURS PRN
Qty: 60 TABLET | Refills: 0 | Status: SHIPPED | OUTPATIENT
Start: 2022-12-16

## 2022-12-19 ENCOUNTER — RA CDI HCC (OUTPATIENT)
Dept: OTHER | Facility: HOSPITAL | Age: 37
End: 2022-12-19

## 2023-01-01 NOTE — ANESTHESIA PREPROCEDURE EVALUATION
Review of Systems/Medical History  Patient summary reviewed  Chart reviewed  No history of anesthetic complications     Cardiovascular  Exercise tolerance (METS): >4,     Pulmonary  Smoker ex-smoker  , No COPD , No asthma , No recent URI , No sleep apnea ,        GI/Hepatic    Chronic liver disease (Gilbert's disease), Bariatric surgery,        Negative  ROS        Endo/Other  Diabetes well controlled type 2 Diet controlled,   No obesity    GYN  Not currently pregnant , Prior pregnancy/OB history ,          Hematology  Negative hematology ROS Anemia ,     Musculoskeletal  Negative musculoskeletal ROS        Neurology  Negative neurology ROS   Diabetic neuropathy,    Psychology   Negative psychology ROS Depression , bipolar disorder,              Physical Exam    Airway    Mallampati score: II  TM Distance: >3 FB  Neck ROM: full     Dental   upper dentures,     Cardiovascular  Cardiovascular exam normal    Pulmonary  Pulmonary exam normal     Other Findings  Fixed upper and lower teeth and in good repair      Anesthesia Plan  ASA Score- 2     Anesthesia Type- general with ASA Monitors  Additional Monitors:   Airway Plan: ETT  Comment: Old charts reviewed  Plan Factors-Patient not instructed to abstain from smoking on day of procedure  Patient did not smoke on day of surgery  Induction- intravenous  Postoperative Plan- Plan for postoperative opioid use  Informed Consent- Anesthetic plan and risks discussed with patient  I personally reviewed this patient with the CRNA  Discussed and agreed on the Anesthesia Plan with the CRNA  Garth Pope           No results found for: HGBA1C    No results found for: NA, K, CL, CO2, ANIONGAP, BUN, CREATININE, GLUCOSE, GLUF, CALCIUM, CORRECTEDCA, AST, ALT, ALKPHOS, PROT, BILITOT, EGFR    Lab Results   Component Value Date    WBC 6 68 12/30/2019    HGB 11 1 (L) 12/30/2019    HCT 39 4 12/30/2019    MCV 76 (L) 12/30/2019     (H) 12/30/2019
-1.65

## 2023-01-10 DIAGNOSIS — F31.9 BIPOLAR AFFECTIVE DISORDER, REMISSION STATUS UNSPECIFIED (HCC): ICD-10-CM

## 2023-01-10 DIAGNOSIS — E13.42 DIABETIC POLYNEUROPATHY ASSOCIATED WITH OTHER SPECIFIED DIABETES MELLITUS (HCC): ICD-10-CM

## 2023-01-10 DIAGNOSIS — F22 DELUSIONAL DISORDER (HCC): ICD-10-CM

## 2023-01-10 DIAGNOSIS — R21 RASH AND NONSPECIFIC SKIN ERUPTION: ICD-10-CM

## 2023-01-10 DIAGNOSIS — R11.2 NAUSEA AND VOMITING: ICD-10-CM

## 2023-01-10 DIAGNOSIS — B88.0 INFESTATION BY DEMODEX: ICD-10-CM

## 2023-01-10 DIAGNOSIS — R10.33 PERIUMBILICAL PAIN: ICD-10-CM

## 2023-01-10 RX ORDER — PANTOPRAZOLE SODIUM 40 MG/1
40 TABLET, DELAYED RELEASE ORAL 2 TIMES DAILY
Qty: 60 TABLET | Refills: 0 | Status: SHIPPED | OUTPATIENT
Start: 2023-01-10

## 2023-01-10 RX ORDER — HYDROXYZINE HYDROCHLORIDE 10 MG/1
10 TABLET, FILM COATED ORAL EVERY 6 HOURS PRN
Qty: 60 TABLET | Refills: 0 | Status: SHIPPED | OUTPATIENT
Start: 2023-01-10

## 2023-01-10 RX ORDER — LAMOTRIGINE 100 MG/1
100 TABLET ORAL
Qty: 30 TABLET | Refills: 0 | Status: SHIPPED | OUTPATIENT
Start: 2023-01-10 | End: 2023-04-10

## 2023-01-14 ENCOUNTER — HOSPITAL ENCOUNTER (EMERGENCY)
Facility: HOSPITAL | Age: 38
Discharge: HOME | End: 2023-01-14
Attending: EMERGENCY MEDICINE | Admitting: EMERGENCY MEDICINE

## 2023-01-14 VITALS
BODY MASS INDEX: 23.05 KG/M2 | SYSTOLIC BLOOD PRESSURE: 135 MMHG | OXYGEN SATURATION: 98 % | RESPIRATION RATE: 18 BRPM | DIASTOLIC BLOOD PRESSURE: 64 MMHG | HEART RATE: 68 BPM | WEIGHT: 135 LBS | HEIGHT: 64 IN | TEMPERATURE: 98.8 F

## 2023-01-14 DIAGNOSIS — T14.8XXA EXCORIATION: ICD-10-CM

## 2023-01-14 DIAGNOSIS — F29 PSYCHOSIS (HCC): ICD-10-CM

## 2023-01-14 DIAGNOSIS — F54 PSYCHOGENIC FORMICATION: Primary | ICD-10-CM

## 2023-01-14 DIAGNOSIS — R20.2 PSYCHOGENIC FORMICATION: Primary | ICD-10-CM

## 2023-01-14 NOTE — ED PROVIDER NOTES
History  Chief Complaint   Patient presents with   • Rash     Pt reports that he eye doctors said she had mites     This is a 24-year-old female with complaints of arthropod infestation and worm infestations  Appears to be actively delusional and as result is difficult to fully obtain a history from her  For an unknown amount of time she is been obsessed with the idea that bugs are infecting her and are causing her to have a diffuse variety of symptoms  She thinks that at some points worms have been erupting from her eyeballs and skin and turning into bugs that fly away in front of her  She is also concerned that she has mites, specifically Demodex  She states that she was seen by a doctor today who told her that she has mites and they are crawling across her eyes and she can see them  She was unable to talk to the doctor while I was in the room  It is unclear who she was calling to attempt to reach  She was not able to provide the doctor's name  She states that they are not currently present because she scrubbed herself for an extended period of time with some form of chemical   She is also here presenting with her 3 children concerned that they have been given the illness from her  She states she has been seen by many specialists who have told her that she has many different infections  Chart review demonstrates that she has been frequently told that nothing is seen and there were no obvious active abnormalities  However, patient's have repeatedly given her treatments for illnesses that she is adamant that she has despite no obvious signs of infection  Patient has been seen by multiple specialist including wound care, ENT, and dermatology    Patient states that she is not seeing her psychiatrist anymore and does not want to disclose her concerns over the bugs as well as her many other generalized systemic concerns to her psychiatrist           Prior to Admission Medications   Prescriptions Last Dose Informant Patient Reported? Taking?    ALPRAZolam (XANAX) 0 5 mg tablet   Yes No   Sig: TAKE ONE TABLET BY MOUTH THREE TIMES A DAY ( 2 WEEK SUPPLY )   Cyanocobalamin (B-12) 1000 MCG SUBL   Yes No   Sig: PLACE ONE TABLET UNDER THE TONGUE EVERY DAY   Lyrica 200 MG capsule   No No   Sig: Take 1 capsule (200 mg total) by mouth 2 (two) times a day   Multiple Vitamin (MULTI-VITAMIN PO)   Yes No   Sig: Take by mouth   doxepin (SINEquan) 50 mg capsule   No No   Sig: Take 1 capsule (50 mg total) by mouth daily at bedtime   ergocalciferol (VITAMIN D2) 50,000 units   Yes No   Sig: TAKE 1 CAPSULE BY MOUTH ONE TIME PER WEEK   gatifloxacin (ZYMAXID) 0 5 %   Yes No   Sig: INSTILL 1 DROP INTO RIGHT EYE FOUR TIMES A DAY   hydrOXYzine HCL (ATARAX) 10 mg tablet   No No   Sig: Take 1 tablet (10 mg total) by mouth every 6 (six) hours as needed for itching   lamoTRIgine (LaMICtal) 100 mg tablet   No No   Sig: Take 1 tablet (100 mg total) by mouth daily at bedtime   lamoTRIgine (LaMICtal) 25 mg tablet   No No   Sig: Take 1 tablet (25 mg total) by mouth daily   metroNIDAZOLE (METROGEL) 0 75 % gel   No No   Sig: Apply topically 2 (two) times a day   pantoprazole (PROTONIX) 40 mg tablet   No No   Sig: Take 1 tablet (40 mg total) by mouth 2 (two) times a day   sucralfate (CARAFATE) 1 g/10 mL suspension   No No   Sig: TAKE 10ML (1GRAM) BY MOUTH FOUR TIMES A DAY WITH MEALS AND AT BEDTIME      Facility-Administered Medications: None       Past Medical History:   Diagnosis Date   • Anemia     iron def   • Chronic pain disorder     feet   • Depression    • Diabetes mellitus (HCC)     neg since gastric bypass   • GERD (gastroesophageal reflux disease)    • Gilbert's syndrome    • Neuropathy     feet sheila   • Vitamin D deficiency        Past Surgical History:   Procedure Laterality Date   •  SECTION      X3   • CHOLECYSTECTOMY     • GASTRIC BYPASS     • HERNIA REPAIR     • [place] EXCISION SKIN ABD INFRAUMBILICAL PANNICULECTOMY N/A 2020 Procedure: [facility]age;  Surgeon: Deng Mora MD;  Location: [address] [place] [address];  Service: Plastics       Family History   Problem Relation Age of Onset   • Mental illness Mother    • Drug abuse Mother      I have reviewed and agree with the history as documented  E-Cigarette/Vaping   • E-Cigarette Use Never User      E-Cigarette/Vaping Substances   • Nicotine No    • THC No    • CBD No    • Flavoring No    • Other No    • Unknown No      Social History     Tobacco Use   • Smoking status: Former     Packs/day: 0 50     Years: 5 00     Pack years: 2 50     Types: Cigarettes   • Smokeless tobacco: Never   Vaping Use   • Vaping Use: Never used   Substance Use Topics   • Alcohol use: Not Currently     Alcohol/week: 0 0 standard drinks     Comment: once a month   • Drug use: Never       Review of Systems   Constitutional: Positive for activity change, appetite change, chills, diaphoresis, fatigue and fever  Many pounds of weight loss over unknown period time   HENT: Positive for congestion  Eyes: Positive for photophobia, pain, redness and visual disturbance  Gastrointestinal: Positive for constipation, diarrhea and nausea  Musculoskeletal: Positive for arthralgias and myalgias  Neurological: Positive for dizziness, weakness, light-headedness and numbness  Psychiatric/Behavioral: Positive for confusion and decreased concentration  The patient is nervous/anxious  Physical Exam  Physical Exam  Constitutional:       General: She is not in acute distress  Appearance: Normal appearance  She is not ill-appearing, toxic-appearing or diaphoretic  HENT:      Head: Normocephalic and atraumatic  Right Ear: External ear normal       Left Ear: External ear normal       Nose: Nose normal       Mouth/Throat:      Mouth: Mucous membranes are moist       Pharynx: Oropharynx is clear  Eyes:      Extraocular Movements: Extraocular movements intact        Conjunctiva/sclera: Conjunctivae normal       Pupils: Pupils are equal, round, and reactive to light  Comments: No mites or active arthropods identified anywhere on the face or near the eye  Cardiovascular:      Comments: Good peripheral perfusion, good coloration  Pulmonary:      Effort: Pulmonary effort is normal    Abdominal:      General: There is no distension  Tenderness: There is no guarding  Musculoskeletal:         General: No swelling, deformity or signs of injury  Normal range of motion  Cervical back: Normal range of motion and neck supple  Skin:     General: Skin is warm  Findings: No bruising  Comments: Excoriations noted, especially on the face and neck  Patient obsessed that the source of her bugs is a scar from when she burned herself with a curling iron 2 years ago on the top of her head  Scar is well-healing  Neurological:      General: No focal deficit present  Mental Status: She is alert and oriented to person, place, and time  Cranial Nerves: No cranial nerve deficit  Gait: Gait normal    Psychiatric:      Comments: Bizarre thoughts, delusions, active hallucinations  No insight into illness  I asked where her children had the reported bug, she said "everywhere cannot you see them there crawling all over the children?"  There were no bugs crawling on her children that were visible  Patient picked up a piece of lint off of her and stated that it was a bug and that she could see it moving  When I said that that was not a bug it was a piece of lint, she responded "well that is because you are looking at it in bright light    You need to turn off the lights to see the bugs "         Vital Signs  ED Triage Vitals [01/14/23 1733]   Temperature Pulse Respirations Blood Pressure SpO2   98 8 °F (37 1 °C) 68 18 135/64 98 %      Temp Source Heart Rate Source Patient Position - Orthostatic VS BP Location FiO2 (%)   Tympanic Monitor Lying Right arm --      Pain Score       No Pain           Vitals:    01/14/23 1733   BP: 135/64   Pulse: 68   Patient Position - Orthostatic VS: Lying         Visual Acuity      ED Medications  Medications - No data to display    Diagnostic Studies  Results Reviewed     None                 No orders to display              Procedures  Procedures         ED Course                                             Medical Decision Making  Is a 73-year-old female with active excoriations secondary to psychosis and psychogenic formication  CPS report filed by myself to [address] worker Andrez Mejia due to concerns for patient's ability to care for children safely in light of active psychosis  Patient also noted to work in a school [employer]     Patient is already had evaluation by multiple specialist for this  There may be an infection that I am not able to see however patient has recently been treated with permethrin and states she does not want it because it is not helpful  Advised patient to have follow-up with dermatology again if she was concerned continued infection  Excoriation: acute illness or injury  Psychogenic formication: undiagnosed new problem with uncertain prognosis  Psychosis ([address] ): undiagnosed new problem with uncertain prognosis      Disposition  Final diagnoses:   Psychogenic formication   Excoriation     Time reflects when diagnosis was documented in both MDM as applicable and the Disposition within this note     Time User Action Codes Description Comment    1/14/2023  5:48 PM Rasheed Cleary Add [R20 2,  F54] Psychogenic formication     1/14/2023  5:48 PM Patricia Escalante 86       ED Disposition     ED Disposition   Discharge    Condition   Stable    Date/Time   Sat Jan 14, 2023  5:48 PM    Comment   Catalino Engel discharge to home/self care                 Follow-up Information     Follow up With Specialties Details Why Clayton Ravi MD Internal Medicine In 1 day  [address] [address]  [phone]            Patient's Medications   Discharge Prescriptions    No medications on file       No discharge procedures on file      PDMP Review       Value Time User    PDMP Reviewed  Yes 1/10/2023 12:52 PM Pako Arias MD          ED Provider  Electronically Signed by           Treva Moore MD  01/14/23 8060

## 2023-01-14 NOTE — ED NOTES
Pt seen in room worried about demodex mites  Pt appeared to be anxious and preoccupied  Pt was  Observed to be picking at the mattress with nothing being on the bed  Pt appears to be irritable with  staff at this timefor not finding mites  Pt remains in the room at this time       Karen Crowell RN  01/14/23 HERBIE Velasquez  01/14/23 [id number]

## 2023-01-16 ENCOUNTER — VBI (OUTPATIENT)
Dept: ADMINISTRATIVE | Facility: OTHER | Age: 38
End: 2023-01-16

## 2023-02-15 DIAGNOSIS — E13.42 DIABETIC POLYNEUROPATHY ASSOCIATED WITH OTHER SPECIFIED DIABETES MELLITUS (HCC): ICD-10-CM

## 2023-02-23 ENCOUNTER — TELEPHONE (OUTPATIENT)
Dept: INTERNAL MEDICINE CLINIC | Facility: CLINIC | Age: 38
End: 2023-02-23

## 2023-03-01 ENCOUNTER — TELEPHONE (OUTPATIENT)
Dept: NEPHROLOGY | Facility: CLINIC | Age: 38
End: 2023-03-01

## 2023-03-01 ENCOUNTER — OFFICE VISIT (OUTPATIENT)
Dept: INTERNAL MEDICINE CLINIC | Facility: CLINIC | Age: 38
End: 2023-03-01

## 2023-03-01 VITALS
WEIGHT: 135.2 LBS | RESPIRATION RATE: 16 BRPM | OXYGEN SATURATION: 97 % | DIASTOLIC BLOOD PRESSURE: 80 MMHG | SYSTOLIC BLOOD PRESSURE: 114 MMHG | HEART RATE: 85 BPM | HEIGHT: 64 IN | BODY MASS INDEX: 23.08 KG/M2

## 2023-03-01 DIAGNOSIS — E78.2 MODERATE MIXED HYPERLIPIDEMIA NOT REQUIRING STATIN THERAPY: ICD-10-CM

## 2023-03-01 DIAGNOSIS — E13.42 DIABETIC POLYNEUROPATHY ASSOCIATED WITH OTHER SPECIFIED DIABETES MELLITUS (HCC): Primary | ICD-10-CM

## 2023-03-01 DIAGNOSIS — B88.0 INFESTATION BY DEMODEX: ICD-10-CM

## 2023-03-01 DIAGNOSIS — L08.9 SKIN INFECTION: ICD-10-CM

## 2023-03-01 DIAGNOSIS — E53.8 VITAMIN B12 DEFICIENCY: ICD-10-CM

## 2023-03-01 DIAGNOSIS — E11.9 CONTROLLED TYPE 2 DIABETES MELLITUS WITHOUT COMPLICATION, WITHOUT LONG-TERM CURRENT USE OF INSULIN (HCC): ICD-10-CM

## 2023-03-01 DIAGNOSIS — E55.9 VITAMIN D DEFICIENCY: ICD-10-CM

## 2023-03-01 DIAGNOSIS — F31.9 BIPOLAR AFFECTIVE DISORDER, REMISSION STATUS UNSPECIFIED (HCC): ICD-10-CM

## 2023-03-01 DIAGNOSIS — K02.9 DENTAL CARIES: ICD-10-CM

## 2023-03-01 DIAGNOSIS — R34 DECREASED URINE OUTPUT: ICD-10-CM

## 2023-03-01 LAB
SL AMB POCT HEMOGLOBIN AIC: 6.6 (ref ?–6.5)
SL AMB POCT UR MICROALBUMIN: NORMAL

## 2023-03-01 RX ORDER — AMOXICILLIN AND CLAVULANATE POTASSIUM 875; 125 MG/1; MG/1
1 TABLET, FILM COATED ORAL EVERY 12 HOURS SCHEDULED
Qty: 20 TABLET | Refills: 0 | Status: SHIPPED | OUTPATIENT
Start: 2023-03-01 | End: 2023-03-11

## 2023-03-01 RX ORDER — MAGNESIUM 200 MG
TABLET ORAL
Status: CANCELLED | OUTPATIENT
Start: 2023-03-01

## 2023-03-01 RX ORDER — LINACLOTIDE 72 UG/1
1 CAPSULE, GELATIN COATED ORAL
COMMUNITY
Start: 2022-11-02

## 2023-03-01 RX ORDER — MECLIZINE HCL 12.5 MG/1
12.5 TABLET ORAL 2 TIMES DAILY
COMMUNITY
Start: 2022-11-02

## 2023-03-01 RX ORDER — LORATADINE 10 MG/1
10 TABLET ORAL DAILY
COMMUNITY
Start: 2022-09-28

## 2023-03-01 RX ORDER — METRONIDAZOLE 7.5 MG/G
GEL TOPICAL 2 TIMES DAILY
Qty: 45 G | Refills: 0 | Status: SHIPPED | OUTPATIENT
Start: 2023-03-01 | End: 2023-05-30

## 2023-03-01 RX ORDER — METRONIDAZOLE 7.5 MG/G
GEL TOPICAL 2 TIMES DAILY
Qty: 45 G | Refills: 0 | Status: CANCELLED | OUTPATIENT
Start: 2023-03-01 | End: 2023-05-30

## 2023-03-01 RX ORDER — ERGOCALCIFEROL 1.25 MG/1
CAPSULE ORAL
Refills: 0 | Status: CANCELLED | OUTPATIENT
Start: 2023-03-01

## 2023-03-01 NOTE — TELEPHONE ENCOUNTER
Dr Heather Parish, pt's mom called and LM asking to schedule an appt with you  Called and spoke to Katya Rosas, she stated that pt was referred to you, but did not remember the name of the referring provider  Pt is urinating only two times a day  Advised pt to get the referral and scheduled her for 06/01  Katya Rosas requested sooner appt  Please review labs ( there is nothing recent) and advise what labs to order and how soon she has to be seen

## 2023-03-01 NOTE — PROGRESS NOTES
Assessment/Plan:     Reports her bites and infestation is decreasing to her lower extremities but her face still is infested; there are several excoriations I can see that appear infected; will tx with ABT; she shares with me CPS was called on her after she had taken her son to the ER for similar bites  She tells me she is not following with her psychiatrist anymore;    DM is well controlled; a1c 6 6; she promises to get labs done  Would like a referral to dental surgery and nephrology  Quality Measures:       Return in about 3 months (around 6/1/2023)  No problem-specific Assessment & Plan notes found for this encounter  Diagnoses and all orders for this visit:    Diabetic polyneuropathy associated with other specified diabetes mellitus ([facility] [address] )    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  -     metroNIDAZOLE (METROGEL) 0 75 % gel; Apply topically 2 (two) times a day    Controlled type 2 diabetes mellitus without complication, without long-term current use of insulin (HCC)  -     CBC and differential; Future  -     Comprehensive metabolic panel; Future  -     Hemoglobin A1C; Future  -     Microalbumin / creatinine urine ratio  -     POCT hemoglobin A1c  -     POCT urine microalbumin/creatinine    Moderate mixed hyperlipidemia not requiring statin therapy  -     Lipid Panel with Direct LDL reflex; Future    Bipolar affective disorder, remission status unspecified ([facility])  -     TSH, 3rd generation with Free T4 reflex; Future  -     Ambulatory Referral to Psychiatry; Future    Vitamin D deficiency  -     Vitamin D 25 hydroxy; Future    Vitamin B12 deficiency  -     Vitamin B12; Future    Skin infection  -     amoxicillin-clavulanate (Augmentin) 875-125 mg per tablet; Take 1 tablet by mouth every 12 (twelve) hours for 10 days    Dental caries  -     Ambulatory Referral to Oral Maxillofacial Surgery; Future    Decreased urine output  -     US kidney and bladder; Future  -     Ambulatory Referral to Nephrology;  Future    Other orders  -     linaCLOtide (Linzess) 72 MCG CAPS; Take 1 capsule by mouth daily before breakfast  -     loratadine (CLARITIN) 10 mg tablet; Take 10 mg by mouth daily  -     meclizine (ANTIVERT) 12 5 MG tablet; Take 12 5 mg by mouth 2 (two) times a day          Subjective:      Patient ID: Nafisa Chandler is a 42 y o  female      Here for       ALLERGIES:  No Known Allergies    CURRENT MEDICATIONS:    Current Outpatient Medications:   •  amoxicillin-clavulanate (Augmentin) 875-125 mg per tablet, Take 1 tablet by mouth every 12 (twelve) hours for 10 days, Disp: 20 tablet, Rfl: 0  •  Cyanocobalamin (B-12) 1000 MCG SUBL, PLACE ONE TABLET UNDER THE TONGUE EVERY DAY, Disp: , Rfl:   •  doxepin (SINEquan) 50 mg capsule, Take 1 capsule (50 mg total) by mouth daily at bedtime, Disp: 30 capsule, Rfl: 2  •  ergocalciferol (VITAMIN D2) 50,000 units, TAKE 1 CAPSULE BY MOUTH ONE TIME PER WEEK, Disp: , Rfl: 0  •  gatifloxacin (ZYMAXID) 0 5 %, INSTILL 1 DROP INTO RIGHT EYE FOUR TIMES A DAY, Disp: , Rfl:   •  hydrOXYzine HCL (ATARAX) 10 mg tablet, Take 1 tablet (10 mg total) by mouth every 6 (six) hours as needed for itching, Disp: 60 tablet, Rfl: 0  •  lamoTRIgine (LaMICtal) 100 mg tablet, Take 1 tablet (100 mg total) by mouth daily at bedtime, Disp: 30 tablet, Rfl: 0  •  lamoTRIgine (LaMICtal) 25 mg tablet, Take 1 tablet (25 mg total) by mouth daily, Disp: 30 tablet, Rfl: 2  •  linaCLOtide (Linzess) 72 MCG CAPS, Take 1 capsule by mouth daily before breakfast, Disp: , Rfl:   •  loratadine (CLARITIN) 10 mg tablet, Take 10 mg by mouth daily, Disp: , Rfl:   •  Lyrica 200 MG capsule, Take 1 capsule (200 mg total) by mouth 2 (two) times a day, Disp: 60 capsule, Rfl: 0  •  meclizine (ANTIVERT) 12 5 MG tablet, Take 12 5 mg by mouth 2 (two) times a day, Disp: , Rfl:   •  metroNIDAZOLE (METROGEL) 0 75 % gel, Apply topically 2 (two) times a day, Disp: 45 g, Rfl: 0  •  Multiple Vitamin (MULTI-VITAMIN PO), Take by mouth, Disp: , Rfl:   • pantoprazole (PROTONIX) 40 mg tablet, Take 1 tablet (40 mg total) by mouth 2 (two) times a day, Disp: 60 tablet, Rfl: 0  •  sucralfate (CARAFATE) 1 g/10 mL suspension, TAKE 10ML (1GRAM) BY MOUTH FOUR TIMES A DAY WITH MEALS AND AT BEDTIME, Disp: 414 mL, Rfl: 0    ACTIVE PROBLEM LIST:  Patient Active Problem List   Diagnosis   • Encounter for physical examination related to employment   • Vitamin D deficiency   • Gilbert's syndrome   • [facility]   • Controlled type 2 diabetes mellitus without complication, without long-term current use of insulin ([facility] )   • Diabetic polyneuropathy ([facility] )   • Lipodystrophy       PAST MEDICAL HISTORY:  Past Medical History:   Diagnosis Date   • Anemia     iron def   • Chronic pain disorder     feet   • Depression    • Diabetes mellitus (HCC)     neg since gastric bypass   • GERD (gastroesophageal reflux disease)    • Gilbert's syndrome    • Neuropathy     feet sheila   • Vitamin D deficiency        PAST SURGICAL HISTORY:  Past Surgical History:   Procedure Laterality Date   •  SECTION      X3   • CHOLECYSTECTOMY     • GASTRIC BYPASS     • HERNIA REPAIR     • VT EXCISION SKIN ABD INFRAUMBILICAL PANNICULECTOMY N/A 2020    Procedure: ABDOMINALPLASTY,CIRCUMFERENTIAL;  Surgeon: Silva Howard MD;  Location: [facility] OR;  Service: Plastics       FAMILY HISTORY:  Family History   Problem Relation Age of Onset   • Mental illness Mother    • Drug abuse Mother        SOCIAL HISTORY:  Social History     Socioeconomic History   • Marital status: /Civil Union     Spouse name: Not on file   • Number of children: Not on file   • Years of education: Not on file   • Highest education level: Not on file   Occupational History   • Occupation:    Tobacco Use   • Smoking status: Former     Packs/day: 0 50     Years: 5 00     Pack years: 2 50     Types: Cigarettes   • Smokeless tobacco: Never   Vaping Use   • Vaping Use: Never used   Substance and Sexual Activity   • Alcohol use: Not Currently     Alcohol/week: 0 0 standard drinks     Comment: once a month   • Drug use: Never   • Sexual activity: Not Currently     Partners: Male     Birth control/protection: None   Other Topics Concern   • Not on file   Social History Narrative   • Not on file     Social Determinants of Health     Financial Resource Strain: Not on file   Food Insecurity: Not on file   Transportation Needs: Not on file   Physical Activity: Not on file   Stress: Not on file   Social Connections: Not on file   Intimate Partner Violence: Not on file   Housing Stability: Not on file       Review of Systems   Genitourinary: Positive for decreased urine volume  Psychiatric/Behavioral: Positive for decreased concentration and dysphoric mood  All other systems reviewed and are negative  Objective:  Vitals:    03/01/23 1542   BP: 114/80   BP Location: Left arm   Patient Position: Sitting   Cuff Size: Adult   Pulse: 85   Resp: 16   SpO2: 97%   Weight: 61 3 kg (135 lb 3 2 oz)   Height: 5' 4" (1 626 m)     Body mass index is 23 21 kg/m²  Physical Exam  Vitals and nursing note reviewed  Constitutional:       Appearance: Normal appearance  She is normal weight  HENT:      Head: Normocephalic and atraumatic  Cardiovascular:      Rate and Rhythm: Normal rate and regular rhythm  Heart sounds: Normal heart sounds  Pulmonary:      Effort: Pulmonary effort is normal       Breath sounds: Normal breath sounds  Musculoskeletal:         General: Normal range of motion  Cervical back: Normal range of motion  Right lower leg: No edema  Left lower leg: No edema  Lymphadenopathy:      Cervical: No cervical adenopathy  Skin:     General: Skin is warm and dry  Findings: Erythema and lesion present  Comments: Multiple excoriations to her face in various stages of healing   Neurological:      General: No focal deficit present        Mental Status: She is alert and oriented to person, place, and time  Mental status is at baseline  Psychiatric:         Mood and Affect: Mood is anxious  Affect is labile  Behavior: Behavior is agitated and aggressive  Behavior is cooperative  Thought Content: Thought content is paranoid and delusional             RESULTS:    In chart    This note was created with voice recognition software  Phonic, grammatical and spelling errors may be present within the note as a result

## 2023-03-02 ENCOUNTER — TELEPHONE (OUTPATIENT)
Dept: INTERNAL MEDICINE CLINIC | Facility: CLINIC | Age: 38
End: 2023-03-02

## 2023-03-02 ENCOUNTER — TELEPHONE (OUTPATIENT)
Dept: NEPHROLOGY | Facility: CLINIC | Age: 38
End: 2023-03-02

## 2023-03-02 DIAGNOSIS — E11.9 CONTROLLED TYPE 2 DIABETES MELLITUS WITHOUT COMPLICATION, WITHOUT LONG-TERM CURRENT USE OF INSULIN (HCC): Primary | ICD-10-CM

## 2023-03-02 DIAGNOSIS — R34 DECREASED URINE OUTPUT: Primary | ICD-10-CM

## 2023-03-02 RX ORDER — LANCETS 33 GAUGE
EACH MISCELLANEOUS
Qty: 300 EACH | Refills: 3 | Status: SHIPPED | OUTPATIENT
Start: 2023-03-02

## 2023-03-02 RX ORDER — BLOOD SUGAR DIAGNOSTIC
STRIP MISCELLANEOUS
Qty: 300 EACH | Refills: 3 | Status: SHIPPED | OUTPATIENT
Start: 2023-03-02

## 2023-03-02 RX ORDER — BLOOD-GLUCOSE METER
KIT MISCELLANEOUS
Qty: 1 KIT | Refills: 0 | Status: SHIPPED | OUTPATIENT
Start: 2023-03-02

## 2023-03-02 NOTE — TELEPHONE ENCOUNTER
Spoke to pt's mom Joelle Fine  Appt was rescheduled for 04/05 with Atiya, labs ordered  Pt requested appt with Dr Frantz Arias and for follow ups she will be seen by Dr Frantz Arias, per Dr Frantz Arias  Pt was ok with that

## 2023-03-02 NOTE — TELEPHONE ENCOUNTER
Pako Arias MD  [facility] 1 minute ago (1:31 PM)     Use visine for eyes  Glucometer sent       Nothing for scalp for now;

## 2023-03-02 NOTE — TELEPHONE ENCOUNTER
I went to the Eye clinic up at [facility] in [address] [place] [address] and the Doctor told me that my Eyes are very irritated and he said that also there's a topical medication that you guys give me For My scalp? Luli Farley Um If You could give me a call back five seven Oh, eight zero seven, one two seven three Thank You  They also called back to ask if we could order her a Glucometer, test strips and lancets to [facility] please?

## 2023-03-03 ENCOUNTER — TELEPHONE (OUTPATIENT)
Dept: PSYCHIATRY | Facility: CLINIC | Age: 38
End: 2023-03-03

## 2023-03-17 DIAGNOSIS — E13.42 DIABETIC POLYNEUROPATHY ASSOCIATED WITH OTHER SPECIFIED DIABETES MELLITUS (HCC): ICD-10-CM

## 2023-03-17 DIAGNOSIS — R10.33 PERIUMBILICAL PAIN: ICD-10-CM

## 2023-03-17 DIAGNOSIS — B88.0 INFESTATION BY DEMODEX: ICD-10-CM

## 2023-03-17 DIAGNOSIS — R21 RASH AND NONSPECIFIC SKIN ERUPTION: ICD-10-CM

## 2023-03-17 DIAGNOSIS — R11.2 NAUSEA AND VOMITING: ICD-10-CM

## 2023-03-17 DIAGNOSIS — Z01.89 PATIENT REQUEST FOR DIAGNOSTIC TESTING: Primary | ICD-10-CM

## 2023-03-17 RX ORDER — HYDROXYZINE HYDROCHLORIDE 10 MG/1
10 TABLET, FILM COATED ORAL EVERY 6 HOURS PRN
Qty: 60 TABLET | Refills: 0 | Status: SHIPPED | OUTPATIENT
Start: 2023-03-17

## 2023-03-17 RX ORDER — PANTOPRAZOLE SODIUM 40 MG/1
40 TABLET, DELAYED RELEASE ORAL 2 TIMES DAILY
Qty: 60 TABLET | Refills: 0 | Status: SHIPPED | OUTPATIENT
Start: 2023-03-17

## 2023-03-17 NOTE — TELEPHONE ENCOUNTER
[facility] Procedure To Be Performed At Next Visit: Biopsy by shave method Size Of Lesion In Cm (Optional): 0 Introduction Text (Please End With A Colon): The following procedure was deferred: Detail Level: Detailed

## 2023-03-23 ENCOUNTER — OFFICE VISIT (OUTPATIENT)
Dept: URGENT CARE | Facility: CLINIC | Age: 38
End: 2023-03-23

## 2023-03-23 VITALS
RESPIRATION RATE: 18 BRPM | DIASTOLIC BLOOD PRESSURE: 79 MMHG | OXYGEN SATURATION: 98 % | TEMPERATURE: 97.7 F | SYSTOLIC BLOOD PRESSURE: 132 MMHG | HEART RATE: 85 BPM

## 2023-03-23 DIAGNOSIS — R30.0 DYSURIA: Primary | ICD-10-CM

## 2023-03-23 LAB
SL AMB  POCT GLUCOSE, UA: 100
SL AMB LEUKOCYTE ESTERASE,UA: NEGATIVE
SL AMB POCT BILIRUBIN,UA: NEGATIVE
SL AMB POCT BLOOD,UA: NEGATIVE
SL AMB POCT CLARITY,UA: ABNORMAL
SL AMB POCT COLOR,UA: YELLOW
SL AMB POCT KETONES,UA: NEGATIVE
SL AMB POCT NITRITE,UA: NEGATIVE
SL AMB POCT PH,UA: 5
SL AMB POCT SPECIFIC GRAVITY,UA: 1.01
SL AMB POCT URINE PROTEIN: NEGATIVE
SL AMB POCT UROBILINOGEN: 0.2

## 2023-03-23 RX ORDER — CEPHALEXIN 500 MG/1
500 CAPSULE ORAL EVERY 12 HOURS SCHEDULED
Qty: 14 CAPSULE | Refills: 0 | Status: SHIPPED | OUTPATIENT
Start: 2023-03-23 | End: 2023-03-30

## 2023-03-23 NOTE — PATIENT INSTRUCTIONS
Continue to monitor symptoms  If new or worsening symptoms develop, go immediately to Er  Drink plenty of fluids  Follow up with Family Doctor this week  Dysuria   WHAT YOU NEED TO KNOW:   Dysuria is difficulty urinating, or pain, burning, or discomfort with urination  Dysuria is usually a symptom of another problem  DISCHARGE INSTRUCTIONS:   Return to the emergency department if:   You have severe back, side, or abdominal pain  You have fever and shaking chills  You vomit several times in a row  Contact your healthcare provider if:   Your symptoms do not go away, even after treatment  You have questions or concerns about your condition or care  Medicines:   Medicines  may be given to help treat a bacterial infection or help decrease bladder spasms  Take your medicine as directed  Contact your healthcare provider if you think your medicine is not helping or if you have side effects  Tell your provider if you are allergic to any medicine  Keep a list of the medicines, vitamins, and herbs you take  Include the amounts, and when and why you take them  Bring the list or the pill bottles to follow-up visits  Carry your medicine list with you in case of an emergency  Follow up with your healthcare provider as directed: Your healthcare provider may also refer you to a urologist or nephrologist to have additional testing  Write down your questions so you remember to ask them during your visits  Manage your dysuria:   Drink more liquids  Liquids help flush out bacteria that may be causing an infection  Ask your healthcare provider how much liquid to drink each day and which liquids are best for you  Take sitz baths as directed  Fill a bathtub with 4 to 6 inches of warm water  You may also use a sitz bath pan that fits over a toilet  Sit in the sitz bath for 20 minutes  Do this 2 to 3 times a day, or as directed  The warm water can help decrease pain and swelling       © Copyright [employer] 2022 Information is for End User's use only and may not be sold, redistributed or otherwise used for commercial purposes  The above information is an  only  It is not intended as medical advice for individual conditions or treatments  Talk to your doctor, nurse or pharmacist before following any medical regimen to see if it is safe and effective for you

## 2023-03-25 LAB
BACTERIA UR CULT: ABNORMAL
BACTERIA UR CULT: ABNORMAL

## 2023-03-28 ENCOUNTER — TELEPHONE (OUTPATIENT)
Dept: NEPHROLOGY | Facility: CLINIC | Age: 38
End: 2023-03-28

## 2023-03-28 NOTE — TELEPHONE ENCOUNTER
Called spoke with patient advised patient to get labs done prior to 04/05 consult appt with Atiya  patient understood and is okay with it

## 2023-04-04 ENCOUNTER — TELEPHONE (OUTPATIENT)
Dept: NEPHROLOGY | Facility: CLINIC | Age: 38
End: 2023-04-04

## 2023-04-04 NOTE — TELEPHONE ENCOUNTER
I called patient to schedule a Nephrology Consult ref by Dr Mark Campoverde for Decreased urine output unable to leave message on patients answering machine due to patients voice has not been set up as of yet

## 2023-04-24 DIAGNOSIS — R21 RASH AND NONSPECIFIC SKIN ERUPTION: ICD-10-CM

## 2023-04-24 DIAGNOSIS — E13.42 DIABETIC POLYNEUROPATHY ASSOCIATED WITH OTHER SPECIFIED DIABETES MELLITUS (HCC): ICD-10-CM

## 2023-04-24 DIAGNOSIS — R11.2 NAUSEA AND VOMITING: ICD-10-CM

## 2023-04-24 DIAGNOSIS — R10.33 PERIUMBILICAL PAIN: ICD-10-CM

## 2023-04-24 DIAGNOSIS — B88.0 INFESTATION BY DEMODEX: ICD-10-CM

## 2023-04-24 RX ORDER — PANTOPRAZOLE SODIUM 40 MG/1
40 TABLET, DELAYED RELEASE ORAL 2 TIMES DAILY
Qty: 60 TABLET | Refills: 0 | Status: SHIPPED | OUTPATIENT
Start: 2023-04-24

## 2023-04-24 RX ORDER — HYDROXYZINE HYDROCHLORIDE 10 MG/1
10 TABLET, FILM COATED ORAL EVERY 6 HOURS PRN
Qty: 60 TABLET | Refills: 0 | Status: SHIPPED | OUTPATIENT
Start: 2023-04-24

## 2023-05-19 ENCOUNTER — TELEPHONE (OUTPATIENT)
Dept: INTERNAL MEDICINE CLINIC | Facility: CLINIC | Age: 38
End: 2023-05-19

## 2023-05-19 NOTE — TELEPHONE ENCOUNTER
(I know we all spoke and we cant do this, is there a non medical lab she could be pointed in the direction of possibly? )       Pt thinks she pulled a bug off her body and wants to have a script for lab to have it identified? Can we order this for her ? Has it in a plastic bag she states

## 2023-05-25 ENCOUNTER — TELEPHONE (OUTPATIENT)
Age: 38
End: 2023-05-25

## 2023-05-25 NOTE — TELEPHONE ENCOUNTER
Patient walked asking to be seen by Dr Viet Pastrana stating that she wanted to be seen due to having the same issue in the past with having clawing creatures/bugs all over her body  As she picked on her shirt, pants, hair, eyes & hands and was apparently putting into a specimen cup  I then told Dr Viet Pastrana he said she needs an appointment since she hasn't been seen for almost a year  An appointment was offered to her but she demanded one today which we didn't have one to offer to her today  The nurse & I  also recommended for her to go downstairs to Urgent Care

## 2023-07-07 DIAGNOSIS — R42 DIZZINESS: ICD-10-CM

## 2023-07-07 DIAGNOSIS — R19.5 LOOSE BOWEL MOVEMENT: Primary | ICD-10-CM

## 2023-07-07 DIAGNOSIS — F31.9 BIPOLAR AFFECTIVE DISORDER, REMISSION STATUS UNSPECIFIED (HCC): ICD-10-CM

## 2023-07-07 DIAGNOSIS — R10.33 PERIUMBILICAL PAIN: ICD-10-CM

## 2023-07-07 DIAGNOSIS — R11.2 NAUSEA AND VOMITING: ICD-10-CM

## 2023-07-07 DIAGNOSIS — E13.42 DIABETIC POLYNEUROPATHY ASSOCIATED WITH OTHER SPECIFIED DIABETES MELLITUS (HCC): ICD-10-CM

## 2023-07-07 DIAGNOSIS — F22 DELUSIONAL DISORDER (HCC): ICD-10-CM

## 2023-07-07 DIAGNOSIS — K21.9 GASTROESOPHAGEAL REFLUX DISEASE, UNSPECIFIED WHETHER ESOPHAGITIS PRESENT: ICD-10-CM

## 2023-07-07 DIAGNOSIS — R21 RASH AND NONSPECIFIC SKIN ERUPTION: ICD-10-CM

## 2023-07-07 DIAGNOSIS — K59.00 CONSTIPATION, UNSPECIFIED CONSTIPATION TYPE: ICD-10-CM

## 2023-07-07 DIAGNOSIS — B88.0 INFESTATION BY DEMODEX: ICD-10-CM

## 2023-07-07 RX ORDER — LAMOTRIGINE 100 MG/1
100 TABLET ORAL
Qty: 30 TABLET | Refills: 0 | Status: SHIPPED | OUTPATIENT
Start: 2023-07-07 | End: 2023-10-05

## 2023-07-07 RX ORDER — LAMOTRIGINE 25 MG/1
25 TABLET ORAL DAILY
Qty: 30 TABLET | Refills: 0 | Status: SHIPPED | OUTPATIENT
Start: 2023-07-07 | End: 2023-10-05

## 2023-07-07 RX ORDER — HYDROXYZINE HYDROCHLORIDE 10 MG/1
10 TABLET, FILM COATED ORAL EVERY 6 HOURS PRN
Qty: 60 TABLET | Refills: 0 | Status: SHIPPED | OUTPATIENT
Start: 2023-07-07

## 2023-07-07 RX ORDER — SUCRALFATE ORAL 1 G/10ML
SUSPENSION ORAL
Qty: 414 ML | Refills: 0 | Status: SHIPPED | OUTPATIENT
Start: 2023-07-07

## 2023-07-07 RX ORDER — DOXEPIN HYDROCHLORIDE 50 MG/1
50 CAPSULE ORAL
Qty: 30 CAPSULE | Refills: 0 | Status: SHIPPED | OUTPATIENT
Start: 2023-07-07 | End: 2023-10-05

## 2023-07-07 RX ORDER — ERGOCALCIFEROL 1.25 MG/1
CAPSULE ORAL
Refills: 0 | Status: CANCELLED | OUTPATIENT
Start: 2023-07-07

## 2023-07-07 RX ORDER — LORATADINE 10 MG/1
10 TABLET ORAL DAILY
Qty: 30 TABLET | Refills: 0 | Status: SHIPPED | OUTPATIENT
Start: 2023-07-07 | End: 2023-08-06

## 2023-07-07 RX ORDER — METRONIDAZOLE 7.5 MG/G
GEL TOPICAL 2 TIMES DAILY
Qty: 45 G | Refills: 0 | Status: CANCELLED | OUTPATIENT
Start: 2023-07-07 | End: 2023-10-05

## 2023-07-07 RX ORDER — MECLIZINE HCL 12.5 MG/1
12.5 TABLET ORAL 2 TIMES DAILY
Qty: 30 TABLET | Refills: 0 | Status: SHIPPED | OUTPATIENT
Start: 2023-07-07

## 2023-07-07 RX ORDER — MAGNESIUM 200 MG
TABLET ORAL
Refills: 0 | Status: CANCELLED | OUTPATIENT
Start: 2023-07-07

## 2023-07-07 RX ORDER — PANTOPRAZOLE SODIUM 40 MG/1
40 TABLET, DELAYED RELEASE ORAL 2 TIMES DAILY
Qty: 60 TABLET | Refills: 0 | Status: SHIPPED | OUTPATIENT
Start: 2023-07-07

## 2023-07-07 RX ORDER — LINACLOTIDE 72 UG/1
1 CAPSULE, GELATIN COATED ORAL
Qty: 30 CAPSULE | Refills: 0 | Status: SHIPPED | OUTPATIENT
Start: 2023-07-07

## 2023-07-08 ENCOUNTER — OFFICE VISIT (OUTPATIENT)
Dept: URGENT CARE | Facility: CLINIC | Age: 38
End: 2023-07-08
Payer: COMMERCIAL

## 2023-07-08 VITALS
RESPIRATION RATE: 18 BRPM | BODY MASS INDEX: 19.74 KG/M2 | OXYGEN SATURATION: 99 % | HEART RATE: 85 BPM | TEMPERATURE: 98.2 F | WEIGHT: 115 LBS

## 2023-07-08 DIAGNOSIS — R21 RASH: Primary | ICD-10-CM

## 2023-07-08 PROCEDURE — 99213 OFFICE O/P EST LOW 20 MIN: CPT | Performed by: PREVENTIVE MEDICINE

## 2023-07-08 NOTE — PROGRESS NOTES
[facility]        NAME: Sonam Felix is a 42 y.o. female  : 1985    MRN: [id number]  DATE: 2023  TIME: 11:21 AM    Assessment and Plan   Rash [R21]  1. Rash              Patient Instructions       Follow up with PCP in 3-5 days. Proceed to  ER if symptoms worsen. Chief Complaint     Chief Complaint   Patient presents with   • Rash     Rash, for a long time, Itchy,          History of Present Illness       Long and rambling history that she is covered with bugs that they come out at night and occasionally will glow in the dark. She says she seen many dermatologist to have not helped her. I asked her about scabies and she said she was told it was not scabies. She says that no one has been able to help her and now her mother and kids are sick with the same type of rash. Review of Systems   Review of Systems   Skin: Positive for rash. Current Medications       Current Outpatient Medications:   •  Blood Glucose Monitoring Suppl (OneTouch Verio Reflect) w/Device KIT, Check blood sugars three times daily. Please substitute with appropriate alternative as covered by patient's insurance. Dx: E11.65, Disp: 1 kit, Rfl: 0  •  Cyanocobalamin (B-12) 1000 MCG SUBL, , Disp: , Rfl:   •  doxepin (SINEquan) 50 mg capsule, Take 1 capsule (50 mg total) by mouth daily at bedtime, Disp: 30 capsule, Rfl: 0  •  ergocalciferol (VITAMIN D2) 50,000 units, , Disp: , Rfl: 0  •  glucose blood (OneTouch Verio) test strip, Check blood sugars three times daily. Please substitute with appropriate alternative as covered by patient's insurance.  Dx: E11.65, Disp: 300 each, Rfl: 3  •  hydrOXYzine HCL (ATARAX) 10 mg tablet, Take 1 tablet (10 mg total) by mouth every 6 (six) hours as needed for itching, Disp: 60 tablet, Rfl: 0  •  lamoTRIgine (LaMICtal) 100 mg tablet, Take 1 tablet (100 mg total) by mouth daily at bedtime, Disp: 30 tablet, Rfl: 0  •  lamoTRIgine (LaMICtal) 25 mg tablet, Take 1 tablet (25 mg total) by mouth daily, Disp: 30 tablet, Rfl: 0  •  linaCLOtide (Linzess) 72 MCG CAPS, Take 1 capsule by mouth daily before breakfast, Disp: 30 capsule, Rfl: 0  •  loratadine (CLARITIN) 10 mg tablet, Take 1 tablet (10 mg total) by mouth daily, Disp: 30 tablet, Rfl: 0  •  Lyrica 200 MG capsule, Take 1 capsule (200 mg total) by mouth 2 (two) times a day, Disp: 60 capsule, Rfl: 0  •  meclizine (ANTIVERT) 12.5 MG tablet, Take 1 tablet (12.5 mg total) by mouth 2 (two) times a day, Disp: 30 tablet, Rfl: 0  •  OneTouch Delica Lancets 82Y MISC, Check blood sugars three times daily. Please substitute with appropriate alternative as covered by patient's insurance.  Dx: E11.65, Disp: 300 each, Rfl: 3  •  pantoprazole (PROTONIX) 40 mg tablet, Take 1 tablet (40 mg total) by mouth 2 (two) times a day, Disp: 60 tablet, Rfl: 0  •  sucralfate (CARAFATE) 1 g/10 mL suspension, TAKE 10ML (1GRAM) BY MOUTH FOUR TIMES A DAY WITH MEALS AND AT BEDTIME, Disp: 414 mL, Rfl: 0  •  gatifloxacin (ZYMAXID) 0.5 %, INSTILL 1 DROP INTO RIGHT EYE FOUR TIMES A DAY (Patient not taking: Reported on 3/23/2023), Disp: , Rfl:   •  metroNIDAZOLE (METROGEL) 0.75 % gel, Apply topically 2 (two) times a day, Disp: 45 g, Rfl: 0  •  Multiple Vitamin (MULTI-VITAMIN PO), Take by mouth, Disp: , Rfl:     Current Allergies     Allergies as of 07/08/2023   • (No Known Allergies)            The following portions of the patient's history were reviewed and updated as appropriate: allergies, current medications, past family history, past medical history, past social history, past surgical history and problem list.     Past Medical History:   Diagnosis Date   • Anemia     iron def   • Chronic pain disorder     feet   • Depression    • Diabetes mellitus ([address])     neg since gastric bypass   • GERD (gastroesophageal reflux disease)    • Gilbert's syndrome    • Neuropathy     feet sheila   • Vitamin D deficiency        Past Surgical History:   Procedure Laterality Date   •  SECTION      X3   • CHOLECYSTECTOMY     • GASTRIC BYPASS     • HERNIA REPAIR     • AR EXCISION SKIN ABD INFRAUMBILICAL PANNICULECTOMY N/A 2020    Procedure: Casie Stern;  Surgeon: Lon Boss MD;  Location: [address] [place] [address] MAIN OR;  Service: Plastics       Family History   Problem Relation Age of Onset   • Mental illness Mother    • Drug abuse Mother          Medications have been verified. Objective   Pulse 85   Temp 98.2 °F (36.8 °C)   Resp 18   Wt 52.2 kg (115 lb)   SpO2 99%   BMI 19.74 kg/m²   No LMP recorded. Physical Exam     Physical Exam  Skin:     Comments: There are erythematous eroded patches on the face. Some scattered papules on extremities. I saw no typical scabies type pattern such as on the fingers or toes.

## 2023-08-10 DIAGNOSIS — R30.0 DYSURIA: Primary | ICD-10-CM

## 2023-08-14 DIAGNOSIS — E13.42 DIABETIC POLYNEUROPATHY ASSOCIATED WITH OTHER SPECIFIED DIABETES MELLITUS (HCC): ICD-10-CM

## 2023-09-21 ENCOUNTER — VBI (OUTPATIENT)
Dept: ADMINISTRATIVE | Facility: OTHER | Age: 38
End: 2023-09-21

## 2023-09-22 ENCOUNTER — RA CDI HCC (OUTPATIENT)
Dept: OTHER | Facility: HOSPITAL | Age: 38
End: 2023-09-22

## 2023-09-22 NOTE — PROGRESS NOTES
[facility] [address] [facility] coding opportunities       Chart reviewed, no opportunity found: CHART REVIEWED, NO OPPORTUNITY FOUND        Patients Insurance        Commercial Insurance: [employer]

## 2023-09-29 DIAGNOSIS — E13.42 DIABETIC POLYNEUROPATHY ASSOCIATED WITH OTHER SPECIFIED DIABETES MELLITUS (HCC): ICD-10-CM

## 2023-09-29 DIAGNOSIS — B88.0 INFESTATION BY DEMODEX: ICD-10-CM

## 2023-09-29 DIAGNOSIS — K59.00 CONSTIPATION, UNSPECIFIED CONSTIPATION TYPE: ICD-10-CM

## 2023-09-29 DIAGNOSIS — K21.9 GASTROESOPHAGEAL REFLUX DISEASE, UNSPECIFIED WHETHER ESOPHAGITIS PRESENT: ICD-10-CM

## 2023-09-29 DIAGNOSIS — R42 DIZZINESS: ICD-10-CM

## 2023-09-29 DIAGNOSIS — R21 RASH AND NONSPECIFIC SKIN ERUPTION: ICD-10-CM

## 2023-09-29 DIAGNOSIS — E11.9 CONTROLLED TYPE 2 DIABETES MELLITUS WITHOUT COMPLICATION, WITHOUT LONG-TERM CURRENT USE OF INSULIN (HCC): ICD-10-CM

## 2023-09-29 DIAGNOSIS — R11.2 NAUSEA AND VOMITING: ICD-10-CM

## 2023-09-29 DIAGNOSIS — R10.33 PERIUMBILICAL PAIN: ICD-10-CM

## 2023-09-29 RX ORDER — LORATADINE 10 MG/1
10 TABLET ORAL DAILY
Qty: 30 TABLET | Refills: 0 | Status: SHIPPED | OUTPATIENT
Start: 2023-09-29 | End: 2023-10-29

## 2023-09-29 RX ORDER — HYDROXYZINE HYDROCHLORIDE 10 MG/1
10 TABLET, FILM COATED ORAL EVERY 6 HOURS PRN
Qty: 60 TABLET | Refills: 0 | Status: SHIPPED | OUTPATIENT
Start: 2023-09-29

## 2023-09-29 RX ORDER — LANCETS 33 GAUGE
EACH MISCELLANEOUS
Qty: 300 EACH | Refills: 0 | Status: SHIPPED | OUTPATIENT
Start: 2023-09-29

## 2023-09-29 RX ORDER — MECLIZINE HCL 12.5 MG/1
12.5 TABLET ORAL 2 TIMES DAILY
Qty: 30 TABLET | Refills: 0 | Status: SHIPPED | OUTPATIENT
Start: 2023-09-29

## 2023-09-29 RX ORDER — SUCRALFATE ORAL 1 G/10ML
SUSPENSION ORAL
Qty: 414 ML | Refills: 0 | Status: SHIPPED | OUTPATIENT
Start: 2023-09-29

## 2023-09-29 RX ORDER — LINACLOTIDE 72 UG/1
1 CAPSULE, GELATIN COATED ORAL
Qty: 30 CAPSULE | Refills: 0 | Status: SHIPPED | OUTPATIENT
Start: 2023-09-29

## 2023-09-29 RX ORDER — BLOOD SUGAR DIAGNOSTIC
STRIP MISCELLANEOUS
Qty: 300 EACH | Refills: 0 | Status: SHIPPED | OUTPATIENT
Start: 2023-09-29

## 2023-09-29 RX ORDER — PANTOPRAZOLE SODIUM 40 MG/1
40 TABLET, DELAYED RELEASE ORAL 2 TIMES DAILY
Qty: 60 TABLET | Refills: 0 | Status: SHIPPED | OUTPATIENT
Start: 2023-09-29

## 2023-10-02 ENCOUNTER — TELEPHONE (OUTPATIENT)
Dept: INTERNAL MEDICINE CLINIC | Facility: CLINIC | Age: 38
End: 2023-10-02

## 2023-10-02 NOTE — TELEPHONE ENCOUNTER
Nuha Greene asked if a prior authorization can be done on the Lyrica 200 MG capsule through the secondary insurance, [employer]. They can be reached at [phone].     Call back #[phone]/Audra Hart

## 2023-10-03 ENCOUNTER — TELEPHONE (OUTPATIENT)
Dept: INTERNAL MEDICINE CLINIC | Facility: CLINIC | Age: 38
End: 2023-10-03

## 2023-10-03 NOTE — TELEPHONE ENCOUNTER
Prior Luma Watt was already submitted 10/2 per their requested. Waiting on the approval. Patient is also aware.

## 2023-10-03 NOTE — TELEPHONE ENCOUNTER
[facility] called - Patient is out of her medication (Lyrica). He will order it for her however, it needs prior auth. He provided the following info:    Primary ins. is [employer] [phone]  Secondary is [facility] [phone]

## 2023-10-16 ENCOUNTER — TELEPHONE (OUTPATIENT)
Dept: INTERNAL MEDICINE CLINIC | Facility: CLINIC | Age: 38
End: 2023-10-16

## 2023-10-16 DIAGNOSIS — E13.42 DIABETIC POLYNEUROPATHY ASSOCIATED WITH OTHER SPECIFIED DIABETES MELLITUS (HCC): ICD-10-CM

## 2023-10-16 NOTE — TELEPHONE ENCOUNTER
Harper Cristina called with a heads up that he sent a fax for medication needing authorization. Document is 80% completed. Patient is completely out of medication. He is asking that you write [employer] at the top before faxing back so that they can process quickly for her.

## 2023-10-17 ENCOUNTER — TELEPHONE (OUTPATIENT)
Dept: INTERNAL MEDICINE CLINIC | Facility: CLINIC | Age: 38
End: 2023-10-17

## 2023-10-17 NOTE — TELEPHONE ENCOUNTER
[facility] called regarding prescription for Lyrica 200mg. This requires prior authorization from both insurances:  [facility]  [phone]  [facility] [phone]    Elmira Hernandez would like a phone call back when prior Zara Trujillo is done so that he can process this. He can be reached at [phone]. He says that he's been working on this for a week now.

## 2023-10-30 ENCOUNTER — OFFICE VISIT (OUTPATIENT)
Dept: INTERNAL MEDICINE CLINIC | Facility: CLINIC | Age: 38
End: 2023-10-30
Payer: COMMERCIAL

## 2023-10-30 VITALS
HEIGHT: 64 IN | OXYGEN SATURATION: 100 % | DIASTOLIC BLOOD PRESSURE: 64 MMHG | BODY MASS INDEX: 24.96 KG/M2 | SYSTOLIC BLOOD PRESSURE: 116 MMHG | TEMPERATURE: 99.4 F | WEIGHT: 146.2 LBS | RESPIRATION RATE: 16 BRPM | HEART RATE: 99 BPM

## 2023-10-30 DIAGNOSIS — Z12.31 SCREENING MAMMOGRAM FOR BREAST CANCER: ICD-10-CM

## 2023-10-30 DIAGNOSIS — R21 RASH AND NONSPECIFIC SKIN ERUPTION: ICD-10-CM

## 2023-10-30 DIAGNOSIS — E55.9 VITAMIN D DEFICIENCY: ICD-10-CM

## 2023-10-30 DIAGNOSIS — E53.8 VITAMIN B12 DEFICIENCY: ICD-10-CM

## 2023-10-30 DIAGNOSIS — B88.0 INFESTATION BY DEMODEX: ICD-10-CM

## 2023-10-30 DIAGNOSIS — F31.9 BIPOLAR AFFECTIVE DISORDER, REMISSION STATUS UNSPECIFIED (HCC): ICD-10-CM

## 2023-10-30 DIAGNOSIS — E11.9 CONTROLLED TYPE 2 DIABETES MELLITUS WITHOUT COMPLICATION, WITHOUT LONG-TERM CURRENT USE OF INSULIN (HCC): Primary | ICD-10-CM

## 2023-10-30 DIAGNOSIS — E11.9 ENCOUNTER FOR DIABETIC FOOT EXAM (HCC): ICD-10-CM

## 2023-10-30 PROBLEM — Z02.89 ENCOUNTER FOR PHYSICAL EXAMINATION RELATED TO EMPLOYMENT: Status: RESOLVED | Noted: 2019-01-14 | Resolved: 2023-10-30

## 2023-10-30 PROBLEM — E88.1 LIPODYSTROPHY: Status: RESOLVED | Noted: 2020-01-10 | Resolved: 2023-10-30

## 2023-10-30 PROBLEM — E80.4 GILBERT'S SYNDROME: Status: RESOLVED | Noted: 2019-11-23 | Resolved: 2023-10-30

## 2023-10-30 LAB — SL AMB POCT HEMOGLOBIN AIC: 6.6 (ref ?–6.5)

## 2023-10-30 PROCEDURE — 83036 HEMOGLOBIN GLYCOSYLATED A1C: CPT | Performed by: INTERNAL MEDICINE

## 2023-10-30 PROCEDURE — 99214 OFFICE O/P EST MOD 30 MIN: CPT | Performed by: INTERNAL MEDICINE

## 2023-10-30 PROCEDURE — 3044F HG A1C LEVEL LT 7.0%: CPT | Performed by: INTERNAL MEDICINE

## 2023-10-30 RX ORDER — MAGNESIUM 200 MG
1 TABLET ORAL DAILY
Qty: 90 TABLET | Refills: 1 | Status: SHIPPED | OUTPATIENT
Start: 2023-10-30

## 2023-10-30 RX ORDER — METRONIDAZOLE 7.5 MG/G
GEL TOPICAL 2 TIMES DAILY
Qty: 45 G | Refills: 1 | Status: CANCELLED | OUTPATIENT
Start: 2023-10-30

## 2023-10-30 RX ORDER — METRONIDAZOLE 7.5 MG/G
GEL TOPICAL 2 TIMES DAILY
Qty: 45 G | Refills: 0 | Status: SHIPPED | OUTPATIENT
Start: 2023-10-30 | End: 2024-01-28

## 2023-10-30 RX ORDER — HYDROXYZINE HYDROCHLORIDE 10 MG/1
10 TABLET, FILM COATED ORAL EVERY 6 HOURS PRN
Qty: 60 TABLET | Refills: 3 | Status: SHIPPED | OUTPATIENT
Start: 2023-10-30

## 2023-10-30 RX ORDER — ERGOCALCIFEROL 1.25 MG/1
50000 CAPSULE ORAL DAILY
Qty: 90 CAPSULE | Refills: 1 | Status: CANCELLED | OUTPATIENT
Start: 2023-10-30

## 2023-10-30 NOTE — PROGRESS NOTES
Assessment/Plan:     Here for routine follow up; reviewed her chronic medical problems, ordered routine labs including cbc CMP TSH A1c lipid vit b 12 and vit d;    Type 2 DM controlled with an A1c of 6.6; continue diet control; she is not on a statin or ace; check A1c lipid before next visit; check micro albumin;     Continue lyrica for neuropathy; foot exam done today; should see podiatry for routine care;    Due for mammogram; up to date with eye exam, obtain records;    Bipolar do, she denies issues; not taking any medications she says, she has a counselor she sees in [place] (living there with her sister for now); for now, plans to keep her doctors here. Quality Measures:       Return in about 6 months (around 4/30/2024). No problem-specific Assessment & Plan notes found for this encounter. Diagnoses and all orders for this visit:    Controlled type 2 diabetes mellitus without complication, without long-term current use of insulin (HCC)  -     POCT hemoglobin A1c  -     POCT urine microalbumin/creatinine  -     Albumin / creatinine urine ratio; Future  -     Comprehensive metabolic panel; Future  -     Hemoglobin A1C; Future  -     TSH, 3rd generation with Free T4 reflex; Future  -     Lipid Panel with Direct LDL reflex; Future  -     CBC and differential; Future  -     Albumin / creatinine urine ratio; Future  -     Comprehensive metabolic panel; Future  -     Hemoglobin A1C; Future  -     CBC and Platelet; Future  -     TSH, 3rd generation with Free T4 reflex; Future  -     Lipid Panel with Direct LDL reflex; Future    Screening mammogram for breast cancer  -     Mammo screening bilateral w 3d & cad; Future    Vitamin B12 deficiency  -     Cyanocobalamin (B-12) 1000 MCG SUBL; Take 1 tablet (1,000 mcg total) by mouth in the morning    Encounter for diabetic foot exam [facility])  -     Ambulatory Referral to Podiatry; Future    Vitamin D deficiency  -     Vitamin D 25 hydroxy;  Future    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  -     metroNIDAZOLE (METROGEL) 0.75 % gel; Apply topically 2 (two) times a day  -     hydrOXYzine HCL (ATARAX) 10 mg tablet; Take 1 tablet (10 mg total) by mouth every 6 (six) hours as needed for itching    Rash and nonspecific skin eruption  -     hydrOXYzine HCL (ATARAX) 10 mg tablet; Take 1 tablet (10 mg total) by mouth every 6 (six) hours as needed for itching          Subjective:      Patient ID: Raza Roman is a 34 y.o. female. Here for routine f/u. ALLERGIES:  No Known Allergies    CURRENT MEDICATIONS:    Current Outpatient Medications:     Blood Glucose Monitoring Suppl (OneTouch Verio Reflect) w/Device KIT, Check blood sugars three times daily. Please substitute with appropriate alternative as covered by patient's insurance. Dx: E11.65, Disp: 1 kit, Rfl: 0    Cyanocobalamin (B-12) 1000 MCG SUBL, Take 1 tablet (1,000 mcg total) by mouth in the morning, Disp: 90 tablet, Rfl: 1    ergocalciferol (VITAMIN D2) 50,000 units, , Disp: , Rfl: 0    glucose blood (OneTouch Verio) test strip, Check blood sugars three times daily. Please substitute with appropriate alternative as covered by patient's insurance.  Dx: E11.65, Disp: 300 each, Rfl: 0    hydrOXYzine HCL (ATARAX) 10 mg tablet, Take 1 tablet (10 mg total) by mouth every 6 (six) hours as needed for itching, Disp: 60 tablet, Rfl: 3    linaCLOtide (Linzess) 72 MCG CAPS, Take 1 capsule by mouth daily before breakfast, Disp: 30 capsule, Rfl: 0    loratadine (CLARITIN) 10 mg tablet, Take 1 tablet (10 mg total) by mouth daily, Disp: 30 tablet, Rfl: 0    Lyrica 200 MG capsule, Take 1 capsule (200 mg total) by mouth 2 (two) times a day, Disp: 60 capsule, Rfl: 0    meclizine (ANTIVERT) 12.5 MG tablet, Take 1 tablet (12.5 mg total) by mouth 2 (two) times a day, Disp: 30 tablet, Rfl: 0    metroNIDAZOLE (METROGEL) 0.75 % gel, Apply topically 2 (two) times a day, Disp: 45 g, Rfl: 0    OneTouch Delica Lancets 55I MISC, Check blood sugars three times daily. Please substitute with appropriate alternative as covered by patient's insurance.  Dx: E11.65, Disp: 300 each, Rfl: 0    pantoprazole (PROTONIX) 40 mg tablet, Take 1 tablet (40 mg total) by mouth 2 (two) times a day, Disp: 60 tablet, Rfl: 0    sucralfate (CARAFATE) 1 g/10 mL suspension, TAKE 10ML (1GRAM) BY MOUTH FOUR TIMES A DAY WITH MEALS AND AT BEDTIME, Disp: 414 mL, Rfl: 0    Multiple Vitamin (MULTI-VITAMIN PO), Take by mouth, Disp: , Rfl:     ACTIVE PROBLEM LIST:  Patient Active Problem List   Diagnosis    Encounter for physical examination related to employment    Vitamin D deficiency    Gilbert's syndrome    Bipolar affective ([facility])    Controlled type 2 diabetes mellitus without complication, without long-term current use of insulin ([facility])    Diabetic polyneuropathy ([facility])    Lipodystrophy       PAST MEDICAL HISTORY:  Past Medical History:   Diagnosis Date    Anemia     iron def    Chronic pain disorder     feet    Depression     Diabetes mellitus (HCC)     neg since gastric bypass    GERD (gastroesophageal reflux disease)     Gilbert's syndrome     Neuropathy     feet sheila    Vitamin D deficiency        PAST SURGICAL HISTORY:  Past Surgical History:   Procedure Laterality Date     SECTION      X3    CHOLECYSTECTOMY      GASTRIC BYPASS      HERNIA REPAIR      AK EXCISION SKIN ABD INFRAUMBILICAL PANNICULECTOMY N/A 2020    Procedure: ABDOMINALPLASTY,CIRCUMFERENTIAL;  Surgeon: Deana Mckeon MD;  Location:  MAIN OR;  Service: Plastics       FAMILY HISTORY:  Family History   Problem Relation Age of Onset    Mental illness Mother     Drug abuse Mother        SOCIAL HISTORY:  Social History     Socioeconomic History    Marital status: /Civil Union     Spouse name: Not on file    Number of children: Not on file    Years of education: Not on file    Highest education level: Not on file   Occupational History    Occupation:    Tobacco Use    Smoking status: Former     Packs/day: 0.50 Years: 5.00     Total pack years: 2.50     Types: Cigarettes    Smokeless tobacco: Never   Vaping Use    Vaping Use: Never used   Substance and Sexual Activity    Alcohol use: Not Currently     Comment: once a month    Drug use: Never    Sexual activity: Not Currently     Partners: Male     Birth control/protection: None   Other Topics Concern    Not on file   Social History Narrative    Not on file     Social Determinants of Health     Financial Resource Strain: Not on file   Food Insecurity: Not on file   Transportation Needs: Not on file   Physical Activity: Not on file   Stress: Not on file   Social Connections: Not on file   Intimate Partner Violence: Not on file   Housing Stability: Not on file       Review of Systems   Constitutional:  Negative for chills and fever. HENT:  Negative for ear pain and sore throat. Eyes:  Negative for pain and visual disturbance. Respiratory:  Negative for cough and shortness of breath. Cardiovascular:  Negative for chest pain and palpitations. Gastrointestinal:  Negative for abdominal pain and vomiting. Genitourinary:  Negative for dysuria and hematuria. Musculoskeletal:  Negative for arthralgias and back pain. Skin:  Negative for color change and rash. Neurological:  Negative for seizures and syncope. Psychiatric/Behavioral:  Positive for decreased concentration and dysphoric mood. The patient is nervous/anxious. All other systems reviewed and are negative. Objective:  Vitals:    10/30/23 1611   BP: 116/64   BP Location: Left arm   Patient Position: Sitting   Cuff Size: Adult   Pulse: 99   Resp: 16   Temp: 99.4 °F (37.4 °C)   TempSrc: Tympanic   SpO2: 100%   Weight: 66.3 kg (146 lb 3.2 oz)   Height: 5' 4" (1.626 m)     Body mass index is 25.1 kg/m². Physical Exam  Vitals and nursing note reviewed. Constitutional:       Appearance: Normal appearance. HENT:      Head: Normocephalic and atraumatic.       Right Ear: Tympanic membrane normal.      Left Ear: Tympanic membrane normal.   Cardiovascular:      Rate and Rhythm: Normal rate and regular rhythm. Heart sounds: Normal heart sounds. Pulmonary:      Effort: Pulmonary effort is normal.      Breath sounds: Normal breath sounds. Musculoskeletal:         General: Normal range of motion. Cervical back: Normal range of motion. Right lower leg: No edema. Left lower leg: No edema. Lymphadenopathy:      Cervical: No cervical adenopathy. Skin:     General: Skin is warm and dry. Neurological:      General: No focal deficit present. Mental Status: She is alert and oriented to person, place, and time. Mental status is at baseline. Psychiatric:         Mood and Affect: Mood normal.           RESULTS:  Hemoglobin A1C   Date/Time Value Ref Range Status   10/30/2023 04:27 PM 6.6 (A) 6.5 Final   10/26/2019 11:06 AM 6.4 (H) <5.7 % Final     Comment:     Reference Range  Non-diabetic                     <5.7  Pre-diabetic                     5.7-6.4  Diabetic                         >=6.5  ADA target for diabetic control  <=7     Hemoglobin   Date/Time Value Ref Range Status   08/12/2022 11:33 AM 11.9 11.5 - 15.4 g/dL Final     Hematocrit   Date/Time Value Ref Range Status   08/12/2022 11:33 AM 38.7 34.8 - 46.1 % Final     Platelets   Date/Time Value Ref Range Status   08/12/2022 11:33  149 - 390 Thousands/uL Final     Sodium   Date/Time Value Ref Range Status   08/12/2022 11:33  135 - 147 mmol/L Final     BUN   Date/Time Value Ref Range Status   08/12/2022 11:33 AM 7 5 - 25 mg/dL Final     Creatinine   Date/Time Value Ref Range Status   08/12/2022 11:33 AM 0.70 0.60 - 1.30 mg/dL Final     Comment:     Standardized to IDMS reference method      In chart    This note was created with voice recognition software. Phonic, grammatical and spelling errors may be present within the note as a result.

## 2023-10-30 NOTE — PROGRESS NOTES
Diabetic Foot Exam    Patient's shoes and socks removed. Right Foot/Ankle   Right Foot Inspection  Skin Exam: skin normal and skin intact. No dry skin, no warmth, no callus, no erythema, no maceration, no abnormal color, no pre-ulcer, no ulcer and no callus. Toe Exam: ROM and strength within normal limits. Sensory   Monofilament testing: intact    Vascular  The right DP pulse is 2+. Right Toe  - Comprehensive Exam  Ecchymosis: none  Swelling: none       Left Foot/Ankle  Left Foot Inspection  Skin Exam: skin normal and skin intact. No dry skin, no warmth, no erythema, no maceration, normal color, no pre-ulcer, no ulcer and no callus. Toe Exam: ROM and strength within normal limits. Sensory   Monofilament testing: intact    Vascular  The left DP pulse is 2+.      Left Toe  - Comprehensive Exam  Ecchymosis: none  Swelling: none       Assign Risk Category  No deformity present  No loss of protective sensation  No weak pulses  Risk: 0

## 2023-10-31 ENCOUNTER — TELEPHONE (OUTPATIENT)
Dept: ADMINISTRATIVE | Facility: OTHER | Age: 38
End: 2023-10-31

## 2023-10-31 NOTE — LETTER
Diabetic Eye Exam Form    Date Requested: 23  Patient: Pedro Shea  Patient : 1985   Referring Provider: Te Carvajal MD      DIABETIC Eye Exam Date _______________________________      Type of Exam MUST be documented for Diabetic Eye Exams. Please CHECK ONE. Retinal Exam       Dilated Retinal Exam       OCT       Optomap-Iris Exam      Fundus Photography       Left Eye - Please check Retinopathy or No Retinopathy        Exam did show retinopathy    Exam did not show retinopathy       Right Eye - Please check Retinopathy or No Retinopathy       Exam did show retinopathy    Exam did not show retinopathy       Comments __________________________________________________________    Practice Providing Exam ______________________________________________    Exam Performed By (print name) _______________________________________      Provider Signature ___________________________________________________      These reports are needed for  compliance. Please fax this completed form and a copy of the Diabetic Eye Exam report to our office located at [address] [place] [address] as soon as possible via Fax [phone] [facility]: Phone [phone]  We thank you for your assistance in treating our mutual patient.

## 2023-10-31 NOTE — TELEPHONE ENCOUNTER
----- Message from Cristal Mike LPN sent at [phone]  4:20 PM EDT -----  Regarding: DM Eye Exam  10/30/23 4:20 PM    Hello, our patient Raza Roman has had Diabetic Eye Exam completed/performed. Please assist in updating the patient chart by making an External outreach to [facility] located in [place], [place]. The date of service is 02/2023.     Thank you,  Cristal Mike LPN  [facility]

## 2023-11-01 NOTE — TELEPHONE ENCOUNTER
Upon review of the In Basket request and the patient's chart, initial outreach has been made via fax to facility. Please see Contacts section for details.      Thank you  Royal Jerardo MA

## 2023-11-03 ENCOUNTER — HOSPITAL ENCOUNTER (OUTPATIENT)
Dept: ULTRASOUND IMAGING | Facility: CLINIC | Age: 38
Discharge: HOME | End: 2023-11-03
Payer: COMMERCIAL

## 2023-11-03 DIAGNOSIS — R34 DECREASED URINE OUTPUT: ICD-10-CM

## 2023-11-03 PROCEDURE — 76775 US EXAM ABDO BACK WALL LIM: CPT

## 2023-11-20 DIAGNOSIS — R10.33 PERIUMBILICAL PAIN: ICD-10-CM

## 2023-11-20 DIAGNOSIS — R11.2 NAUSEA AND VOMITING: ICD-10-CM

## 2023-11-20 RX ORDER — PANTOPRAZOLE SODIUM 40 MG/1
40 TABLET, DELAYED RELEASE ORAL 2 TIMES DAILY
Qty: 60 TABLET | Refills: 0 | Status: SHIPPED | OUTPATIENT
Start: 2023-11-20

## 2023-11-21 NOTE — TELEPHONE ENCOUNTER
As a final attempt, a third outreach has been made via telephone call to facility. The outcome of the telephone call was a fax request form to be sent to Office/Facility. Please see Contacts section for details. This encounter will be closed and completed by end of day. Should we receive the requested information because of previous outreach attempts, the requested patient's chart will be updated appropriately. Refaxed again.   11-21    Thank you  Roesmary Bartholomew MA     Thank you  Rosemary Bartholomew MA

## 2023-12-11 DIAGNOSIS — E53.8 VITAMIN B12 DEFICIENCY: ICD-10-CM

## 2023-12-11 DIAGNOSIS — B88.0 INFESTATION BY DEMODEX: ICD-10-CM

## 2023-12-11 DIAGNOSIS — K59.00 CONSTIPATION, UNSPECIFIED CONSTIPATION TYPE: ICD-10-CM

## 2023-12-11 DIAGNOSIS — E11.9 CONTROLLED TYPE 2 DIABETES MELLITUS WITHOUT COMPLICATION, WITHOUT LONG-TERM CURRENT USE OF INSULIN (HCC): ICD-10-CM

## 2023-12-11 DIAGNOSIS — R42 DIZZINESS: ICD-10-CM

## 2023-12-11 DIAGNOSIS — E13.42 DIABETIC POLYNEUROPATHY ASSOCIATED WITH OTHER SPECIFIED DIABETES MELLITUS (HCC): ICD-10-CM

## 2023-12-11 RX ORDER — LINACLOTIDE 72 UG/1
1 CAPSULE, GELATIN COATED ORAL
Qty: 30 CAPSULE | Refills: 0 | Status: SHIPPED | OUTPATIENT
Start: 2023-12-11

## 2023-12-11 RX ORDER — LINACLOTIDE 72 UG/1
1 CAPSULE, GELATIN COATED ORAL EVERY MORNING
Qty: 90 CAPSULE | Refills: 1 | Status: SHIPPED | OUTPATIENT
Start: 2023-12-11

## 2023-12-11 RX ORDER — MAGNESIUM 200 MG
1 TABLET ORAL DAILY
Qty: 90 TABLET | Refills: 0 | Status: SHIPPED | OUTPATIENT
Start: 2023-12-11

## 2023-12-11 RX ORDER — LANCETS 33 GAUGE
EACH MISCELLANEOUS
Qty: 300 EACH | Refills: 0 | Status: SHIPPED | OUTPATIENT
Start: 2023-12-11

## 2023-12-11 RX ORDER — MECLIZINE HCL 12.5 MG/1
12.5 TABLET ORAL 2 TIMES DAILY
Qty: 30 TABLET | Refills: 0 | Status: SHIPPED | OUTPATIENT
Start: 2023-12-11

## 2023-12-11 RX ORDER — METRONIDAZOLE 7.5 MG/G
GEL TOPICAL 2 TIMES DAILY
Qty: 45 G | Refills: 0 | Status: SHIPPED | OUTPATIENT
Start: 2023-12-11 | End: 2024-03-10

## 2023-12-11 RX ORDER — BLOOD SUGAR DIAGNOSTIC
STRIP MISCELLANEOUS
Qty: 300 EACH | Refills: 0 | Status: SHIPPED | OUTPATIENT
Start: 2023-12-11

## 2023-12-13 ENCOUNTER — TELEPHONE (OUTPATIENT)
Dept: INTERNAL MEDICINE CLINIC | Facility: CLINIC | Age: 38
End: 2023-12-13

## 2023-12-13 NOTE — TELEPHONE ENCOUNTER
Luan Davis, pharmacist, is asking for another prior authorization for Lyrica for patient. He states it went through last month and [facility]. He did put request in through cover my meds. Asked if we could call [employer] as patient is out of this medication? Luan Davis gave [employer] number [phone].     Call back #[phone]  [facility]/[place]ase Blanca/pharmacist

## 2023-12-14 ENCOUNTER — TELEPHONE (OUTPATIENT)
Dept: INTERNAL MEDICINE CLINIC | Facility: CLINIC | Age: 38
End: 2023-12-14

## 2023-12-14 NOTE — TELEPHONE ENCOUNTER
Natalia called to say on the Lyrica 200 mg pre auth,    it worked last month but its not going through now . .. Says shruti was approved? Yet we have to call insurance at  and see what the snag is?     [address] pharmacist

## 2023-12-14 NOTE — TELEPHONE ENCOUNTER
Spoke with the pharmacist at [facility] and they believe it's something on their end. We did obtain the proper authorization from the insurance.

## 2024-01-22 DIAGNOSIS — E53.8 VITAMIN B12 DEFICIENCY: ICD-10-CM

## 2024-01-22 DIAGNOSIS — B88.0 INFESTATION BY DEMODEX: ICD-10-CM

## 2024-01-22 DIAGNOSIS — E13.42 DIABETIC POLYNEUROPATHY ASSOCIATED WITH OTHER SPECIFIED DIABETES MELLITUS (HCC): ICD-10-CM

## 2024-01-22 DIAGNOSIS — R10.33 PERIUMBILICAL PAIN: ICD-10-CM

## 2024-01-22 DIAGNOSIS — R11.2 NAUSEA AND VOMITING: ICD-10-CM

## 2024-01-22 DIAGNOSIS — R21 RASH AND NONSPECIFIC SKIN ERUPTION: ICD-10-CM

## 2024-01-22 RX ORDER — LORATADINE 10 MG/1
10 TABLET ORAL DAILY
Qty: 30 TABLET | Refills: 0 | Status: SHIPPED | OUTPATIENT
Start: 2024-01-22 | End: 2024-02-21

## 2024-01-22 RX ORDER — MAGNESIUM 200 MG
1 TABLET ORAL DAILY
Qty: 90 TABLET | Refills: 0 | Status: SHIPPED | OUTPATIENT
Start: 2024-01-22

## 2024-01-22 RX ORDER — HYDROXYZINE HYDROCHLORIDE 10 MG/1
10 TABLET, FILM COATED ORAL EVERY 6 HOURS PRN
Qty: 60 TABLET | Refills: 0 | Status: SHIPPED | OUTPATIENT
Start: 2024-01-22

## 2024-01-22 RX ORDER — METRONIDAZOLE 7.5 MG/G
GEL TOPICAL 2 TIMES DAILY
Qty: 45 G | Refills: 0 | Status: SHIPPED | OUTPATIENT
Start: 2024-01-22 | End: 2024-01-26 | Stop reason: SDUPTHER

## 2024-01-23 RX ORDER — PANTOPRAZOLE SODIUM 40 MG/1
40 TABLET, DELAYED RELEASE ORAL 2 TIMES DAILY
Qty: 60 TABLET | Refills: 0 | Status: SHIPPED | OUTPATIENT
Start: 2024-01-23

## 2024-01-26 DIAGNOSIS — B88.0 INFESTATION BY DEMODEX: ICD-10-CM

## 2024-01-26 RX ORDER — METRONIDAZOLE 7.5 MG/G
GEL TOPICAL 2 TIMES DAILY
Qty: 45 G | Refills: 0 | Status: SHIPPED | OUTPATIENT
Start: 2024-01-26 | End: 2024-04-25

## 2024-02-01 DIAGNOSIS — E13.42 DIABETIC POLYNEUROPATHY ASSOCIATED WITH OTHER SPECIFIED DIABETES MELLITUS (HCC): ICD-10-CM

## 2024-02-05 ENCOUNTER — TELEPHONE (OUTPATIENT)
Dept: INTERNAL MEDICINE CLINIC | Facility: CLINIC | Age: 39
End: 2024-02-05

## 2024-02-05 ENCOUNTER — NURSE TRIAGE (OUTPATIENT)
Dept: OTHER | Facility: OTHER | Age: 39
End: 2024-02-05

## 2024-02-05 ENCOUNTER — PATIENT MESSAGE (OUTPATIENT)
Dept: INTERNAL MEDICINE CLINIC | Facility: CLINIC | Age: 39
End: 2024-02-05

## 2024-02-05 ENCOUNTER — DOCUMENTATION (OUTPATIENT)
Dept: INTERNAL MEDICINE CLINIC | Facility: CLINIC | Age: 39
End: 2024-02-05

## 2024-02-05 DIAGNOSIS — K59.00 CONSTIPATION, UNSPECIFIED CONSTIPATION TYPE: ICD-10-CM

## 2024-02-05 DIAGNOSIS — R10.33 PERIUMBILICAL PAIN: ICD-10-CM

## 2024-02-05 DIAGNOSIS — E11.9 CONTROLLED TYPE 2 DIABETES MELLITUS WITHOUT COMPLICATION, WITHOUT LONG-TERM CURRENT USE OF INSULIN (HCC): ICD-10-CM

## 2024-02-05 DIAGNOSIS — E13.42 DIABETIC POLYNEUROPATHY ASSOCIATED WITH OTHER SPECIFIED DIABETES MELLITUS (HCC): ICD-10-CM

## 2024-02-05 DIAGNOSIS — R11.2 NAUSEA AND VOMITING: ICD-10-CM

## 2024-02-05 DIAGNOSIS — B88.0 INFESTATION BY DEMODEX: ICD-10-CM

## 2024-02-05 DIAGNOSIS — R42 DIZZINESS: ICD-10-CM

## 2024-02-05 DIAGNOSIS — K21.9 GASTROESOPHAGEAL REFLUX DISEASE, UNSPECIFIED WHETHER ESOPHAGITIS PRESENT: ICD-10-CM

## 2024-02-05 DIAGNOSIS — R21 RASH AND NONSPECIFIC SKIN ERUPTION: ICD-10-CM

## 2024-02-05 DIAGNOSIS — E53.8 VITAMIN B12 DEFICIENCY: ICD-10-CM

## 2024-02-05 NOTE — TELEPHONE ENCOUNTER
"Regarding: Lyrica rx sent to wrong pharmacy/out of med/pharm closes at 7  ----- Message from Terri Mohr sent at 2/5/2024  5:42 PM EST -----  \"I spoke to and sent messages to the drs office several times today because they sent my medication to the wrong pharmacy again. I've moved and they keep sending it to [place] but I'm in [place] now. I need this medicine I am out and the pharmacy closes at 7\"    Updated pharmacy in [facility] to [facility] [place].    "

## 2024-02-05 NOTE — TELEPHONE ENCOUNTER
She's saying she is out of lyrica and there was a pharmacy supply error last time she picked up lyrica from [facility] thus showing its too early to fill but she says its not true.    Saying it was only a 15 day supply ? So she asks us to [facility]k with [facility] on this to confirm and resend script

## 2024-02-05 NOTE — TELEPHONE ENCOUNTER
Calling about her prescription for Lyrica. She wants it to be sent to [facility] in [place] so that she can pick it up before she goes to work.  [facility] when she gets home from work.  She is completely out.

## 2024-02-05 NOTE — TELEPHONE ENCOUNTER
"had a refill of her lyrica sent to the wrong pharmacy today. She is completely out of her medication and is requesting it be resent to the [facility] in the [facility] in [place].   Reason for Disposition   [1] Prescription not at pharmacy AND [2] was prescribed by PCP recently (Exception: triager has access to EMR and prescription is recorded there. Go to Home Care and confirm for pharmacy.)    Answer Assessment - Initial Assessment Questions  1. DRUG NAME: \"What medicine do you need to have refilled?\"      Lyrica  4. PRESCRIBING HCP: \"Who prescribed it?\" Reason: If prescribed by specialist, call should be referred to that group.      Dr. Multani    Protocols used: Medication Refill and Renewal Call-ADULT-    "

## 2024-02-06 DIAGNOSIS — E13.42 DIABETIC POLYNEUROPATHY ASSOCIATED WITH OTHER SPECIFIED DIABETES MELLITUS (HCC): ICD-10-CM

## 2024-02-06 RX ORDER — METRONIDAZOLE 7.5 MG/G
GEL TOPICAL 2 TIMES DAILY
Qty: 45 G | Refills: 0 | Status: SHIPPED | OUTPATIENT
Start: 2024-02-06 | End: 2024-05-06

## 2024-02-06 RX ORDER — MAGNESIUM 200 MG
1 TABLET ORAL DAILY
Qty: 90 TABLET | Refills: 0 | Status: SHIPPED | OUTPATIENT
Start: 2024-02-06

## 2024-02-06 RX ORDER — MECLIZINE HCL 12.5 MG/1
12.5 TABLET ORAL 2 TIMES DAILY
Qty: 30 TABLET | Refills: 0 | Status: SHIPPED | OUTPATIENT
Start: 2024-02-06

## 2024-02-06 RX ORDER — LINACLOTIDE 72 UG/1
1 CAPSULE, GELATIN COATED ORAL
Qty: 30 CAPSULE | Refills: 0 | Status: SHIPPED | OUTPATIENT
Start: 2024-02-06

## 2024-02-06 RX ORDER — SUCRALFATE ORAL 1 G/10ML
SUSPENSION ORAL
Qty: 414 ML | Refills: 1 | Status: SHIPPED | OUTPATIENT
Start: 2024-02-06

## 2024-02-06 RX ORDER — LORATADINE 10 MG/1
10 TABLET ORAL DAILY
Qty: 30 TABLET | Refills: 0 | Status: SHIPPED | OUTPATIENT
Start: 2024-02-06 | End: 2024-03-07

## 2024-02-06 RX ORDER — LANCETS 33 GAUGE
EACH MISCELLANEOUS
Qty: 300 EACH | Refills: 0 | Status: SHIPPED | OUTPATIENT
Start: 2024-02-06

## 2024-02-06 RX ORDER — HYDROXYZINE HYDROCHLORIDE 10 MG/1
10 TABLET, FILM COATED ORAL EVERY 6 HOURS PRN
Qty: 60 TABLET | Refills: 0 | Status: SHIPPED | OUTPATIENT
Start: 2024-02-06

## 2024-02-06 RX ORDER — PANTOPRAZOLE SODIUM 40 MG/1
40 TABLET, DELAYED RELEASE ORAL 2 TIMES DAILY
Qty: 60 TABLET | Refills: 2 | Status: SHIPPED | OUTPATIENT
Start: 2024-02-06

## 2024-02-06 RX ORDER — BLOOD SUGAR DIAGNOSTIC
STRIP MISCELLANEOUS
Qty: 300 EACH | Refills: 0 | Status: SHIPPED | OUTPATIENT
Start: 2024-02-06

## 2024-02-06 RX ORDER — LINACLOTIDE 72 UG/1
1 CAPSULE, GELATIN COATED ORAL EVERY MORNING
Qty: 90 CAPSULE | Refills: 1 | Status: SHIPPED | OUTPATIENT
Start: 2024-02-06

## 2024-02-12 ENCOUNTER — TELEPHONE (OUTPATIENT)
Dept: GASTROENTEROLOGY | Facility: CLINIC | Age: 39
End: 2024-02-12

## 2024-02-12 NOTE — TELEPHONE ENCOUNTER
It has been over a year since the patient had an OV.   Routing to GI Office    PA for sucralfate     Submitted via  [][employer]-KEY   [][employer]-Case ID #   [x]Faxed to [facility]   []Other website   []Phone call Case ID #     Office notes sent, clinical questions answered. Awaiting determination

## 2024-02-12 NOTE — TELEPHONE ENCOUNTER
Received Fax from [facility] needing prior auth for Sucralfate 1 gm/10ML     Key [id number]    Please initiate prior authorization. Thanks.      Clerical:  Please call pt to setup f/up appt. Pt was last seen 5/5/22 in the office. Thanks

## 2024-02-12 NOTE — TELEPHONE ENCOUNTER
Routing to [place] clerical.      Patient need OV to be able to renew me ds.. has not been seen since 2022. Please advise thank you !

## 2024-02-12 NOTE — TELEPHONE ENCOUNTER
Called pharmacy  BIN [id number]  PCN [id number]  GRP [id number]  ID [id number]  PHONE NUMBER OF INSURANCE   NAME OF INSURANCE [employer]     Different address on pharmacy file     [address] [place] [id number]

## 2024-02-12 NOTE — TELEPHONE ENCOUNTER
LMOM for patient to phone back and schedule a fup appt for medication review with either Dr. Antony or Kayla

## 2024-02-15 NOTE — TELEPHONE ENCOUNTER
Called pt and advised about medication denied and that she needs an OV.    Pt voiced understanding and agreed to call us back to schedule a F/U appt.

## 2024-02-15 NOTE — TELEPHONE ENCOUNTER
PA for surcalfate  Denied    Reason:(Screenshot if applicable)        Message sent to office clinical pool Yes    Denial letter scanned into Media Yes    Appeal started No

## 2024-02-24 DIAGNOSIS — R21 RASH AND NONSPECIFIC SKIN ERUPTION: ICD-10-CM

## 2024-02-24 DIAGNOSIS — B88.0 INFESTATION BY DEMODEX: ICD-10-CM

## 2024-02-26 RX ORDER — HYDROXYZINE HYDROCHLORIDE 10 MG/1
10 TABLET, FILM COATED ORAL EVERY 6 HOURS PRN
Qty: 360 TABLET | Refills: 1 | Status: SHIPPED | OUTPATIENT
Start: 2024-02-26

## 2024-03-01 DIAGNOSIS — E13.42 DIABETIC POLYNEUROPATHY ASSOCIATED WITH OTHER SPECIFIED DIABETES MELLITUS (HCC): ICD-10-CM

## 2024-03-01 DIAGNOSIS — B88.0 INFESTATION BY DEMODEX: ICD-10-CM

## 2024-03-01 RX ORDER — METRONIDAZOLE 7.5 MG/G
GEL TOPICAL 2 TIMES DAILY
Qty: 45 G | Refills: 0 | Status: SHIPPED | OUTPATIENT
Start: 2024-03-01 | End: 2024-05-30

## 2024-03-03 DIAGNOSIS — R21 RASH AND NONSPECIFIC SKIN ERUPTION: ICD-10-CM

## 2024-03-04 RX ORDER — LORATADINE 10 MG/1
10 TABLET ORAL DAILY
Qty: 90 TABLET | Refills: 1 | Status: SHIPPED | OUTPATIENT
Start: 2024-03-04

## 2024-03-19 ENCOUNTER — TELEPHONE (OUTPATIENT)
Dept: INTERNAL MEDICINE CLINIC | Facility: CLINIC | Age: 39
End: 2024-03-19

## 2024-03-19 DIAGNOSIS — F31.9 BIPOLAR AFFECTIVE DISORDER, REMISSION STATUS UNSPECIFIED (HCC): Primary | ICD-10-CM

## 2024-03-19 RX ORDER — PREGABALIN 200 MG/1
200 CAPSULE ORAL 2 TIMES DAILY
Qty: 60 CAPSULE | Refills: 0 | Status: SHIPPED | OUTPATIENT
Start: 2024-03-19 | End: 2024-03-25 | Stop reason: SDUPTHER

## 2024-03-19 RX ORDER — PREGABALIN 200 MG/1
200 CAPSULE ORAL 2 TIMES DAILY
COMMUNITY
End: 2024-03-19

## 2024-03-19 NOTE — TELEPHONE ENCOUNTER
"Patient was notified, she was ok with it just requested to be placed on a \"wait list\" for a sooner apt than the one she currently has in April, otherwise understood.  "

## 2024-03-19 NOTE — TELEPHONE ENCOUNTER
Hello, my secondary insurance was discontinued and I can’t afford to pay the balance for the lyrica. The pharmacy said they can’t use the scripts they have for lyrica to fill Pregabalin. If [facility] could [facility] [place]. Thanks

## 2024-03-19 NOTE — TELEPHONE ENCOUNTER
Patient needs a letter stating that she is not sick otherwise she would possibly not be able to see her children.    Please advise......

## 2024-03-22 ENCOUNTER — TELEPHONE (OUTPATIENT)
Age: 39
End: 2024-03-22

## 2024-03-22 NOTE — TELEPHONE ENCOUNTER
Patient call she  would like to schedule a new  patient appointment.  Can we please  add her to the wait  list.

## 2024-03-22 NOTE — TELEPHONE ENCOUNTER
"Per note from PEP:    \"  Regarding: Urinary issues, please schedule  ----- Message from Janna Sterling sent at 3/22/2024  2:46 PM EDT -----  Pt called in wanting to schedule an appt. She wants to see another provider other than the one she has a follow up for. Referral still has to be sent by PCP, but was referred to Neph. She states that she has not been able to urinate properly. Please give patient a call to schedule accordingly.\"         RN reviewed chart, patient has not yet been seen by [facility].  Called and spoke with patient.  Patient states that her PCP had previously referred her to nephrology in Oct.  Patient is scheduled in June, but would like to see Dr BERNARDINO Greer if possible.  Patient reports infrequent urination and her PCP had previously advised her to see nephrology for oliguria and patient reports family hx of kidney disease in mother and brother.  Patient said that she got a new phone so never received the calls to schedule.  Upon review of scheduled there are no available appts in near future with Dr Greer.  Offered patient soonest available with Dr Matthews in [place] on 4/25, patient accepted.  Advised patient that limited advice can be given at this time as she is not yet an established patient.  Strongly advised patient to schedule a sooner appointment with PCP if any worsening in meantime.  Patient reports that she was recently in the hospital in Feb with anemia, and does have several follow up appointments scheduled.  She also has an upcoming appointment scheduled with urology. Patient also added to wait list.   "

## 2024-03-25 ENCOUNTER — OFFICE VISIT (OUTPATIENT)
Dept: INTERNAL MEDICINE CLINIC | Facility: CLINIC | Age: 39
End: 2024-03-25
Payer: COMMERCIAL

## 2024-03-25 VITALS
BODY MASS INDEX: 25.1 KG/M2 | HEIGHT: 64 IN | DIASTOLIC BLOOD PRESSURE: 78 MMHG | HEART RATE: 89 BPM | WEIGHT: 147 LBS | OXYGEN SATURATION: 98 % | SYSTOLIC BLOOD PRESSURE: 122 MMHG

## 2024-03-25 DIAGNOSIS — E55.9 VITAMIN D DEFICIENCY: ICD-10-CM

## 2024-03-25 DIAGNOSIS — E11.9 CONTROLLED TYPE 2 DIABETES MELLITUS WITHOUT COMPLICATION, WITHOUT LONG-TERM CURRENT USE OF INSULIN (HCC): ICD-10-CM

## 2024-03-25 DIAGNOSIS — F31.9 BIPOLAR AFFECTIVE DISORDER, REMISSION STATUS UNSPECIFIED (HCC): ICD-10-CM

## 2024-03-25 DIAGNOSIS — D50.0 IRON DEFICIENCY ANEMIA DUE TO CHRONIC BLOOD LOSS: Primary | ICD-10-CM

## 2024-03-25 DIAGNOSIS — E13.42 DIABETIC POLYNEUROPATHY ASSOCIATED WITH OTHER SPECIFIED DIABETES MELLITUS (HCC): ICD-10-CM

## 2024-03-25 PROBLEM — D64.9 ANEMIA: Status: ACTIVE | Noted: 2024-02-22

## 2024-03-25 PROBLEM — D50.9 IRON DEFICIENCY ANEMIA: Status: ACTIVE | Noted: 2019-10-21

## 2024-03-25 PROBLEM — Z90.3 H/O GASTRIC SLEEVE: Status: ACTIVE | Noted: 2024-02-22

## 2024-03-25 PROCEDURE — 99214 OFFICE O/P EST MOD 30 MIN: CPT | Performed by: INTERNAL MEDICINE

## 2024-03-25 RX ORDER — CHLORPROMAZINE HYDROCHLORIDE 10 MG/1
TABLET, FILM COATED ORAL DAILY
COMMUNITY
Start: 2024-01-04

## 2024-03-25 RX ORDER — ESCITALOPRAM OXALATE 10 MG/1
10 TABLET ORAL 2 TIMES DAILY
COMMUNITY

## 2024-03-25 RX ORDER — IVERMECTIN 3 MG/1
TABLET ORAL
COMMUNITY
Start: 2024-02-22

## 2024-03-25 RX ORDER — PREGABALIN 200 MG/1
200 CAPSULE ORAL 3 TIMES DAILY
Start: 2024-03-25

## 2024-03-25 RX ORDER — DEXTROAMPHETAMINE SACCHARATE, AMPHETAMINE ASPARTATE, DEXTROAMPHETAMINE SULFATE AND AMPHETAMINE SULFATE 2.5; 2.5; 2.5; 2.5 MG/1; MG/1; MG/1; MG/1
10 TABLET ORAL 2 TIMES DAILY
COMMUNITY
Start: 2024-02-19

## 2024-03-25 NOTE — PROGRESS NOTES
Assessment/Plan:     Patient is here for hospital follow-up.  She was hospitalized in [place] for symptomatic anemia requiring blood transfusions.  Discharge summary and instructions were reviewed.  She has follow-up with hematology for iron deficiency anemia coming up in cardiology for chest pain.  Ordered labs today because she is going to get them done including CBC CMP TSH A1c lipid vitamin D.    She continues follow-up with psychiatry.  She is on Lexapro.    Her peripheral neuropathy is not controlled, she cannot get namebrand Lyrica, would like to increase the pregabalin, did just send her a new prescription for 200 twice a day, she will take it 3 times a day, when she is ready for refill she will let me know.     Quality Measures:       No follow-ups on file.    No problem-specific Assessment & Plan notes found for this encounter.       Diagnoses and all orders for this visit:    Iron deficiency anemia due to chronic blood loss  -     Comprehensive metabolic panel; Future  -     CBC and differential; Future    Controlled type 2 diabetes mellitus without complication, without long-term current use of insulin (HCC)  -     Albumin / creatinine urine ratio; Future  -     Comprehensive metabolic panel; Future  -     Hemoglobin A1C; Future  -     TSH, 3rd generation with Free T4 reflex; Future  -     CBC and differential; Future    Vitamin D deficiency  -     Vitamin D 25 hydroxy; Future    Bipolar affective disorder, remission status unspecified (HCC)    Diabetic polyneuropathy associated with other specified diabetes mellitus (HCC)    Other orders  -     amphetamine-dextroamphetamine (ADDERALL) 10 mg tablet; Take 10 mg by mouth 2 (two) times a day  -     escitalopram (LEXAPRO) 10 mg tablet; Take 10 mg by mouth 2 (two) times a day  -     chlorproMAZINE (THORAZINE) 10 mg tablet; daily  -     ivermectin (STROMECTOL) 3 MG TABS  -     metFORMIN (GLUCOPHAGE) 1000 MG tablet          Subjective:      Patient ID:  Zunilda Mckay is a 38 y.o. female.    Here for hospital f/u.      ALLERGIES:  No Known Allergies    CURRENT MEDICATIONS:    Current Outpatient Medications:     amphetamine-dextroamphetamine (ADDERALL) 10 mg tablet, Take 10 mg by mouth 2 (two) times a day, Disp: , Rfl:     Blood Glucose Monitoring Suppl (OneTouch Verio Reflect) w/Device KIT, Check blood sugars three times daily. Please substitute with appropriate alternative as covered by patient's insurance. Dx: E11.65, Disp: 1 kit, Rfl: 0    chlorproMAZINE (THORAZINE) 10 mg tablet, daily, Disp: , Rfl:     Cyanocobalamin (B-12) 1000 MCG SUBL, Take 1 tablet (1,000 mcg total) by mouth in the morning, Disp: 90 tablet, Rfl: 0    escitalopram (LEXAPRO) 10 mg tablet, Take 10 mg by mouth 2 (two) times a day, Disp: , Rfl:     glucose blood (OneTouch Verio) test strip, Check blood sugars three times daily. Please substitute with appropriate alternative as covered by patient's insurance. Dx: E11.65, Disp: 300 each, Rfl: 0    hydrOXYzine HCL (ATARAX) 10 mg tablet, TAKE 1 TABLET BY MOUTH EVERY 6 HOURS AS NEEDED FOR ITCHING, Disp: 360 tablet, Rfl: 1    ivermectin (STROMECTOL) 3 MG TABS, , Disp: , Rfl:     linaCLOtide (Linzess) 72 MCG CAPS, Take 1 capsule by mouth every morning, Disp: 90 capsule, Rfl: 1    loratadine (CLARITIN) 10 mg tablet, TAKE 1 TABLET BY MOUTH EVERY DAY, Disp: 90 tablet, Rfl: 1    meclizine (ANTIVERT) 12.5 MG tablet, Take 1 tablet (12.5 mg total) by mouth 2 (two) times a day, Disp: 30 tablet, Rfl: 0    metFORMIN (GLUCOPHAGE) 1000 MG tablet, , Disp: , Rfl:     metroNIDAZOLE (METROGEL) 0.75 % gel, Apply topically 2 (two) times a day, Disp: 45 g, Rfl: 0    OneTouch Delica Lancets 33G MISC, Check blood sugars three times daily. Please substitute with appropriate alternative as covered by patient's insurance. Dx: E11.65, Disp: 300 each, Rfl: 0    pantoprazole (PROTONIX) 40 mg tablet, Take 1 tablet (40 mg total) by mouth 2 (two) times a day, Disp: 60 tablet,  Rfl: 2    pregabalin (LYRICA) 200 MG capsule, Take 1 capsule (200 mg total) by mouth 2 (two) times a day, Disp: 60 capsule, Rfl: 0    sucralfate (CARAFATE) 1 g/10 mL suspension, TAKE 10ML (1GRAM) BY MOUTH FOUR TIMES A DAY WITH MEALS AND AT BEDTIME, Disp: 414 mL, Rfl: 1    ergocalciferol (VITAMIN D2) 50,000 units, , Disp: , Rfl: 0    Multiple Vitamin (MULTI-VITAMIN PO), Take by mouth, Disp: , Rfl:     ACTIVE PROBLEM LIST:  Patient Active Problem List   Diagnosis    Vitamin D deficiency    Bipolar affective (HCC)    Controlled type 2 diabetes mellitus without complication, without long-term current use of insulin (HCC)    Diabetic polyneuropathy (HCC)    Anemia    Iron deficiency anemia    H/O gastric sleeve       PAST MEDICAL HISTORY:  Past Medical History:   Diagnosis Date    Anemia     iron def    Chronic pain disorder     feet    Depression     Diabetes mellitus (HCC)     neg since gastric bypass    GERD (gastroesophageal reflux disease)     Gilbert's syndrome     Neuropathy     feet sheila    Vitamin D deficiency        PAST SURGICAL HISTORY:  Past Surgical History:   Procedure Laterality Date     SECTION      X3    CHOLECYSTECTOMY      GASTRIC BYPASS      HERNIA REPAIR      AR EXCISION SKIN ABD INFRAUMBILICAL PANNICULECTOMY N/A 2020    Procedure: ABDOMINALPLASTY,CIRCUMFERENTIAL;  Surgeon: Modesto Macias MD;  Location:  MAIN OR;  Service: Plastics       FAMILY HISTORY:  Family History   Problem Relation Age of Onset    Mental illness Mother     Drug abuse Mother        SOCIAL HISTORY:  Social History     Socioeconomic History    Marital status: /Civil Union     Spouse name: Not on file    Number of children: Not on file    Years of education: Not on file    Highest education level: Not on file   Occupational History    Occupation:    Tobacco Use    Smoking status: Former     Current packs/day: 0.50     Average packs/day: 0.5 packs/day for 5.0 years (2.5 ttl pk-yrs)     Types:  "Cigarettes    Smokeless tobacco: Never   Vaping Use    Vaping status: Never Used   Substance and Sexual Activity    Alcohol use: Not Currently     Comment: once a month    Drug use: Never    Sexual activity: Not Currently     Partners: Male     Birth control/protection: None   Other Topics Concern    Not on file   Social History Narrative    Not on file     Social Determinants of Health     Financial Resource Strain: Not on file   Food Insecurity: Not on file   Transportation Needs: Not on file   Physical Activity: Not on file   Stress: Not on file   Social Connections: Not on file   Intimate Partner Violence: Not on file   Housing Stability: Not on file       Review of Systems   Constitutional:  Negative for chills and fever.   HENT:  Negative for ear pain and sore throat.    Eyes:  Negative for pain and visual disturbance.   Respiratory:  Negative for cough and shortness of breath.    Cardiovascular:  Negative for chest pain and palpitations.   Gastrointestinal:  Negative for abdominal pain and vomiting.   Genitourinary:  Negative for dysuria and hematuria.   Musculoskeletal:  Negative for arthralgias and back pain.   Skin:  Negative for color change and rash.   Neurological:  Negative for seizures and syncope.   All other systems reviewed and are negative.        Objective:  Vitals:    03/25/24 1104   BP: 122/78   BP Location: Left arm   Patient Position: Sitting   Cuff Size: Standard   Pulse: 89   SpO2: 98%   Weight: 66.7 kg (147 lb)   Height: 5' 4\" (1.626 m)     Body mass index is 25.23 kg/m².     Physical Exam  Vitals and nursing note reviewed.   Constitutional:       Appearance: Normal appearance.   HENT:      Head: Normocephalic and atraumatic.   Cardiovascular:      Rate and Rhythm: Normal rate and regular rhythm.      Heart sounds: Normal heart sounds.   Pulmonary:      Effort: Pulmonary effort is normal.      Breath sounds: Normal breath sounds.   Musculoskeletal:         General: Normal range of motion. "      Cervical back: Normal range of motion.      Right lower leg: No edema.      Left lower leg: No edema.   Skin:     General: Skin is warm and dry.   Neurological:      General: No focal deficit present.      Mental Status: She is alert and oriented to person, place, and time. Mental status is at baseline.   Psychiatric:         Mood and Affect: Mood normal.           RESULTS:  Hemoglobin A1C   Date/Time Value Ref Range Status   10/30/2023 04:27 PM 6.6 (A) 6.5 Final   10/26/2019 11:06 AM 6.4 (H) <5.7 % Final     Comment:     Reference Range  Non-diabetic                     <5.7  Pre-diabetic                     5.7-6.4  Diabetic                         >=6.5  ADA target for diabetic control  <=7     Hemoglobin   Date/Time Value Ref Range Status   08/12/2022 11:33 AM 11.9 11.5 - 15.4 g/dL Final     Hematocrit   Date/Time Value Ref Range Status   08/12/2022 11:33 AM 38.7 34.8 - 46.1 % Final     Platelets   Date/Time Value Ref Range Status   08/12/2022 11:33  149 - 390 Thousands/uL Final     Sodium   Date/Time Value Ref Range Status   06/08/2023 02:21  136 - 145 mmol/L Final     BUN   Date/Time Value Ref Range Status   06/08/2023 02:21 PM 12 7 - 25 mg/dL Final     Creatinine   Date/Time Value Ref Range Status   06/08/2023 02:21 PM 0.65 0.60 - 1.20 mg/dL Final      In chart    This note was created with voice recognition software.  Phonic, grammatical and spelling errors may be present within the note as a result.

## 2024-03-27 ENCOUNTER — TELEPHONE (OUTPATIENT)
Dept: NEPHROLOGY | Facility: CLINIC | Age: 39
End: 2024-03-27

## 2024-03-27 ENCOUNTER — OFFICE VISIT (OUTPATIENT)
Age: 39
End: 2024-03-27
Payer: COMMERCIAL

## 2024-03-27 VITALS — BODY MASS INDEX: 25.1 KG/M2 | TEMPERATURE: 98 F | HEIGHT: 64 IN | WEIGHT: 147 LBS

## 2024-03-27 DIAGNOSIS — D22.9 MULTIPLE NEVI: ICD-10-CM

## 2024-03-27 DIAGNOSIS — L29.9 PRURITUS: ICD-10-CM

## 2024-03-27 DIAGNOSIS — B88.0 INFESTATION BY DEMODEX: ICD-10-CM

## 2024-03-27 DIAGNOSIS — D18.01 CHERRY ANGIOMA: ICD-10-CM

## 2024-03-27 DIAGNOSIS — Z13.89 SCREENING FOR SKIN CONDITION: Primary | ICD-10-CM

## 2024-03-27 DIAGNOSIS — L81.4 LENTIGINES: ICD-10-CM

## 2024-03-27 PROCEDURE — 99214 OFFICE O/P EST MOD 30 MIN: CPT

## 2024-03-27 RX ORDER — METRONIDAZOLE 7.5 MG/G
GEL TOPICAL 2 TIMES DAILY
Qty: 45 G | Refills: 0 | Status: SHIPPED | OUTPATIENT
Start: 2024-03-27 | End: 2024-06-25

## 2024-03-27 NOTE — PROGRESS NOTES
"[facility] Note     Patient Name: Zunilda Mckay  Encounter Date: March 27, 2024     Have you been cared for by a [facility] Dermatologist in the last 3 years and, if so, which description applies to you?    Yes.  I have been here within the last 3 years, and my medical history has NOT changed since that time.  I am FEMALE/of child-bearing potential.    REVIEW OF SYSTEMS:  Have you recently had or currently have any of the following? No changes in my recent health.   PAST MEDICAL HISTORY:  Have you personally ever had or currently have any of the following?  If \"YES,\" then please provide more detail. No changes in my medical history.   HISTORY OF IMMUNOSUPPRESSION: Do you have a history of any of the following:  Systemic Immunosuppression such as Diabetes, Biologic or Immunotherapy, Chemotherapy, Organ Transplantation, Bone Marrow Transplantation?  YES, DIABETES     Answering \"YES\" requires the addition of the dotphrase \"IMMUNOSUPPRESSED\" as the first diagnosis of the patient's visit.   FAMILY HISTORY:  Any \"first degree relatives\" (parent, brother, sister, or child) with the following?    No changes in my family's known health.   PATIENT EXPERIENCE:    Do you want the Dermatologist to perform a COMPLETE skin exam today including a clinical examination under the \"bra and underwear\" areas?  Yes  If necessary, do we have your permission to call and leave a detailed message on your Preferred Phone number that includes your specific medical information?  Yes      No Known Allergies   Current Outpatient Medications:     amphetamine-dextroamphetamine (ADDERALL) 10 mg tablet, Take 10 mg by mouth 2 (two) times a day, Disp: , Rfl:     Blood Glucose Monitoring Suppl (OneTouch Verio Reflect) w/Device KIT, Check blood sugars three times daily. Please substitute with appropriate alternative as covered by patient's insurance. Dx: E11.65, Disp: 1 kit, Rfl: 0    chlorproMAZINE (THORAZINE) 10 mg tablet, daily, " Disp: , Rfl:     Cyanocobalamin (B-12) 1000 MCG SUBL, Take 1 tablet (1,000 mcg total) by mouth in the morning, Disp: 90 tablet, Rfl: 0    ergocalciferol (VITAMIN D2) 50,000 units, , Disp: , Rfl: 0    escitalopram (LEXAPRO) 10 mg tablet, Take 10 mg by mouth 2 (two) times a day, Disp: , Rfl:     glucose blood (OneTouch Verio) test strip, Check blood sugars three times daily. Please substitute with appropriate alternative as covered by patient's insurance. Dx: E11.65, Disp: 300 each, Rfl: 0    hydrOXYzine HCL (ATARAX) 10 mg tablet, TAKE 1 TABLET BY MOUTH EVERY 6 HOURS AS NEEDED FOR ITCHING, Disp: 360 tablet, Rfl: 1    linaCLOtide (Linzess) 72 MCG CAPS, Take 1 capsule by mouth every morning, Disp: 90 capsule, Rfl: 1    loratadine (CLARITIN) 10 mg tablet, TAKE 1 TABLET BY MOUTH EVERY DAY, Disp: 90 tablet, Rfl: 1    meclizine (ANTIVERT) 12.5 MG tablet, Take 1 tablet (12.5 mg total) by mouth 2 (two) times a day, Disp: 30 tablet, Rfl: 0    metFORMIN (GLUCOPHAGE) 1000 MG tablet, , Disp: , Rfl:     metroNIDAZOLE (METROGEL) 0.75 % gel, Apply topically 2 (two) times a day, Disp: 45 g, Rfl: 0    OneTouch Delica Lancets 33G MISC, Check blood sugars three times daily. Please substitute with appropriate alternative as covered by patient's insurance. Dx: E11.65, Disp: 300 each, Rfl: 0    pantoprazole (PROTONIX) 40 mg tablet, Take 1 tablet (40 mg total) by mouth 2 (two) times a day, Disp: 60 tablet, Rfl: 2    pregabalin (LYRICA) 200 MG capsule, Take 1 capsule (200 mg total) by mouth 3 (three) times a day, Disp: , Rfl:     sucralfate (CARAFATE) 1 g/10 mL suspension, TAKE 10ML (1GRAM) BY MOUTH FOUR TIMES A DAY WITH MEALS AND AT BEDTIME, Disp: 414 mL, Rfl: 1    ivermectin (STROMECTOL) 3 MG TABS, , Disp: , Rfl:     Multiple Vitamin (MULTI-VITAMIN PO), Take by mouth, Disp: , Rfl:           Whom besides the patient is providing clinical information about today's encounter?   NO ADDITIONAL HISTORIAN (patient alone provided  "history)    Physical Exam and Assessment/Plan by Diagnosis:    Chief complaint: Patient is a 39 y/o female present for a routine skin exam without history of skin cancer and no spots of concern.    MELANOCYTIC NEVI (\"Moles\")    Physical Exam:  Anatomic Location Affected: Mostly on sun-exposed areas of the trunk and extremities  Morphological Description:  Scattered, 1-4mm round to ovoid, symmetrical-appearing, even bordered, skin colored to dark brown macules/papules, mostly in sun-exposed areas  Pertinent Positives:  Pertinent Negatives:    Additional History of Present Condition:  present on exam    Assessment and Plan:  Based on a thorough discussion of this condition and the management approach to it (including a comprehensive discussion of the known risks, side effects and potential benefits of treatment), the patient (family) agrees to implement the following specific plan:  Recommend routine skin exams every year   Sun avoidance, protective clothing (known as UPF clothing), and the use of at least SPF 30 sunscreens is advised. Sunscreen should be reapplied every two hours when outside.     ANGIOMA (\"CHERRY ANGIOMA\")    Physical Exam:  Anatomic Location: scattered across sun exposed areas of the trunk and extremities   Morphologic Description: Firm red to reddish-blue discrete papules  Pertinent Positives:  Pertinent Negatives:    Additional History of Present Condition:  Present on exam.     Assessment and Plan:  Reassured benign    LENTIGO    Physical Exam:  Anatomic Location Affected:  sun exposed areas  Morphological Description:  tan/brown macules  Pertinent Positives:  Pertinent Negatives:    Additional History of Present Condition:  present on exam    Assessment and Plan:  Based on a thorough discussion of this condition and the management approach to it (including a comprehensive discussion of the known risks, side effects and potential benefits of treatment), the patient (family) agrees to implement " the following specific plan:  Monitor for change    PRURITUS with excoriations    Physical Exam:  Anatomic Location Affected:  trunk, face and extremities  Morphological Description:  scattered excoriations and scarring     Additional History of Present Condition:  present for years, pt states she uses Metrogel from pcp which helps with itching and healing of the bites from the bugs burrowed in her skin. Completed course of oral and topical ivermectin approx. 3 weeks ago. Helped itch for a couple days but then itch returned.    Assessment and Plan:  Based on a thorough discussion of this condition and the management approach to it (including a comprehensive discussion of the known risks, side effects and potential benefits of treatment), the patient (family) agrees to implement the following specific plan:  Start N-Acetylcysteine 600mg twice a day  Continue Claritin   Metrogel BID sent for one more month. Patient feels it helps control the itch.    Scribe Attestation      I,:  Armida Michele am acting as a scribe while in the presence of the attending physician.:       I,:  Holly Ortiz PA-C personally performed the services described in this documentation    as scribed in my presence.:

## 2024-03-27 NOTE — TELEPHONE ENCOUNTER
DARLYN for pt as she has two consults scheduled with us. One is for 4/25 in [place] and another for 6/17 in [place]. Asked pt to call back to determine which appointment she would like to keep.

## 2024-03-27 NOTE — PATIENT INSTRUCTIONS
What is pruritus?  Pruritus is the medical term for itch. Itch is an unpleasant sensation on the skin that provokes the desire to rub or scratch the area to obtain relief. Itch can cause discomfort and frustration; in severe cases it can lead to disturbed sleep, anxiety and depression. Constant scratching to obtain relief can damage the skin (excoriation, lichenification) and reduce its effectiveness as a major protective barrier.     Pruritus is often a symptom of an underlying disease process such as a skin problem, a systemic disease, or abnormal nerve impulses.    What are skin signs of pruritus?   There are no specific skin signs associated with pruritus, apart from scratch marks (excoriations) and signs of the underlying condition.   Persistent scratching over a period of time may lead to:  Lichenification (thickened skin, lichen simplex)   Prurigo papules and nodules.    Who gets pruritus?  The epidemiology of pruritus depends on its underlying cause or causes. However, in general, the incidence of chronic pruritus increases with age, it is more common in women, and in those of  background.     Mechanisms underlying pruritus  Itch, like pain, can originate anywhere along the neural itch pathway, from the central nervous system (brain and spinal cord) to the peripheral nervous system and the skin.Mechanisms underlying pruritus are complex.  The itch signal is transmitted mainly through small, itch-selective C-fibers in the skin in addition to histamine-triggered and non-histaminergic neurons.   These connect with secondary neurons which cross the opposite side of the spinothalamic tract and ascend to parts of the brain involved in sensation, emotion, reward and memory. These areas overlap with those activated by pain.   Patients with chronic pruritus usually have both peripheral and central hypersensitization (heightened reaction) which means they tend to overreact to noxious stimuli which normally  inhibit itch (such as heat and scratching) and also misinterpret non-noxious stimuli as an itch (eg, light touch)    The way scratching stops itching has been explained by an interaction with pain pathways within the dorsal horn of the spinal cord.    Localized pruritus  Localized pruritus is pruritus that is confined to a certain part of the body. It can occur in association with a primary rash (eg dermatitis) or may occur because of hypersensitive nerves in the skin (neuropathic pruritus). Neuropathic pruritus is due to compression or degeneration of nerves in the skin, on route to the spine or in the spine itself. Neuropathic itch is sometimes associated with reduced or absent sweating in the affected area of skin.  Typical causes of localized itchy rashes  Scalp: seborrhoeic dermatitis, head lice   Back: Lan disease   Hands: pompholyx, irritant and/or allergic contact dermatitis   Genitals: vulvovaginal Candida albicans infection, lichen sclerosus   Legs: venous eczema   Feet: tinea pedis    Neuropathic causes of localized pruritus without primary rash  Face: trigeminal trophic syndrome   Hand: cheiralgia paraesthetica   Arm: brachioradial pruritus   Back: notalgia paraesthetica/topics/brachioradial-pruritus/   Genital: pruritus vulvae, pruritus ani   Dermatomal: herpes zoster (shingles) during recovery phase  Scratching a localised itch may lead to lichen simplex, prurigo or prurigo nodularis.    Systemic causes of pruritus  Sytemic diseases may cause generalised pruritus. This is sometimes called metabolic itch. There is nothing wrong with the skin itself, at least until it's been scratched.  Metabolic disorders include chronic renal failure (dialysis) and liver disease (with or without cholestasis).  Uraemic pruritus arises in patients undergoing dialysis is due to a combination of xerosis (dry skin), secondary hyperparathyroidism, peripheral neuropathy (nerve changes) and inflammation.   Secondary  hyperparathyroidism which also occurs in dialysis patients leads to microprecipitation (deposition) of calcium and magnesium salts in the skin, triggering mast cell degeneration, releasing serotonin and histamine.   Once chronic pruritus has occurred, there may be secondary changes in the nerves in the skin and central nervous system which heighten the sensation of itch.    Hepatogenic pruritus is more common in intrahepatic than extrahepatic cholestasis. Examples of intrahepatic cholestasis is associated with chronic viral hepatitis, primary biliary cirrhosis, pregnancy-related cholestasis. Extra-hepatic cholestasis is associated with pressure on the bile ducts, eg from pancreatic tumours or pseudocysts.   Cholestasis is thought to release toxic substances from the liver, which stimulates neural itch fibres in the skin.   Characteristically, cholestatic pruritus is most severe at night; it tends to affect the hands, feet and areas where clothes are rubbing on the skin.    Haematological disorders include iron deficiency anaemia and polycythaemia vera.  Generalized pruritus along with glossitis (tongue inflammation) and angular cheilitis (inflammation of mouth corners) are seen in iron deficiency anaemia.   In polycythaemia vera, itch is usually precipitated by contact with water (aquagenic pruritus), eg after a shower. This is thought to be mediated by the effect of platelets, serotonin and prostaglandins.    Endocrine disorders include thyroid disease and diabetes mellitus.  In Graves' disease (thyrotoxicosis), increased blood flow, skin temperature and decreased itch threshold mediated by the increase in thyroid hormones, lead to the itch. Pruritus associated with myxoedema and hypothyroidism is rare, and if present, is more likely the result of xerosis (dry skin).   In diabetes mellitus, localized itch tends to occur in the perianal/genital region usually due to Candida albicans or dermatophyte infections. It is  unclear if metabolic abnormalities such as renal impairment, autonomic failure or diabetic neuropathy contribute to this.    Paraneoplastic itch is associated with lymphoma, especially Hodgkin lymphoma, leukemia or a solid organ tumor (eg lung, colon, brain).  In Hodgkin lymphoma, pruritus is thought to be caused by histamine release, which may be related to eosinophilia.    Infections causing itch include human immunodeficiency virus infection (HIV) and hepatitis C virus.  Patients with HIV commonly complain of itch. This may be associated with skin infections/infestations, dry skin, drug reactions, hyperoesinophilia (increased eosinophil levels) and cutaneous T cell lymphoma. There is a possible correlation between intractable pruritus and increased HIV viral load.   In chronic hepatitis C infection, the mechanisms responsible for itch remain unclear. In the absence of cholestasis, pruritus may be related to antiviral therapy; it has been noted to occur in patients treated with combination therapy (interferon jazmin and ribavirin).    Pruritic skin diseases  Pruritus is often a symptom of many skin diseases. Some of these are included in the following list.  Allergic contact dermatitis   Dry skin   Urticaria   Psoriasis   Atopic dermatitis   Folliculitis   Dermatitis herpetiformis   Lichen simplex   Lichen planus   Bullous pemphigoid   Lice   Scabies   Miliaria   Sunburn   Pityriasis rosea   Mycosis fungoides    Exposure-related pruritus  Pruritus may arise as a result of exposure to certain external factors.  Allergens or irritants   Cold, which can cause 'winter itch'   A physical urticaria, such as dermographism   Aquagenic pruritus (itch on exposure to water)   Insects and infestations, eg scabies   Medications (topical or systemic) eg opioids, aspirin    Hormonal reasons for pruritus  About 2% of pregnant women have pruritus without any obvious dermatological cause. In some cases the itch is due to cholestasis  (pooling of bile in the gall bladder and liver). It usually occurs in the 3rd trimester and is relieved after giving birth.  Generalized itch is also a common symptom of menopause.    How is pruritus diagnosed?  The first steps of evaluation of an itchy patient are medical history and examination.  A thorough history can identify constitutional symptoms that may point towards an underlying systemic disease. Drug triggers such as opioids may be identified, especially if the commencement of the drug relates to the itch.    A careful examination can identify dermatological causes for the itch (eg scabies, lichen simplex, pemphigoid) or evidence of chronic skin changes related to the itch. In dermatological causes of pruritus, primary skin lesions will usually suggest the diagnosis. Patients without primary skin lesions and little evidence of chronic scratching should be investigated for systemic, neuropathic and psychogenic causes.  The panel of investigations could include:  Full/complete blood count   Creatinine and renal function tests   Liver function tests   Thyroid function tests   Erythrocyte sedimentation rate   Chest radiography   HIV serology    What treatment is available for itch?  The management of pruritus relies on establishing the cause and then either removing or treating the cause to prevent further itching. In many cases, tests are necessary to determine the cause; while these are in progress, treatment to provide symptomatic relief of pruritus may be given.    Topical treatments  In addition to specific therapy for any underlying skin or internal disease, topical treatment may include:  Wet dressings or tepid shower to cool the skin   Calamine lotion (contains phenol, which cools the skin): avoid on dry skin and limit use to a few days   Menthol/camphor lotion: gives a chilling sensation   Local anesthetics, such as pramoxine (also called pramocaine), applied to small itchy spots such as insect  bites   Regular use of emollients, especially if skin is dry   Mild topical corticosteroids for short periods   Topical calcineurin inhibitors are also used to reduce itch associated with inflammatory skin conditions   Topical doxepin, a tricyclic antidepressant and antihistamine, is an antipruritic used in eczema.    Other measures that can be useful in preventing pruritus include avoiding precipitating factors such as rough clothing or fabrics, overheating, and vasodilators if they provoke itching (eg, caffeine, alcohol, spices). Fingernails should be kept short and clean. If the urge to scratch is irresistible then rub the area with your palm.  Topical antihistamines should not be used for chronic itch, as they may sensitize the skin and result in allergic contact dermatitis.    Systemic therapy  If pruritus is severe and sleep is disturbed, then treatment with oral medication may be necessary. Some drugs may help to relieve the itch whilst others are given solely for their sedative effects.  Antihistamines are most useful in urticaria, in which histamine is released. Use for other pruritic conditions is not supported by randomized control trials. Sedating antihistamines may be used for their sedative effects.   Doxepin and amitriptyline are tricyclic antidepressants have antipruritic action and act on the central and peripheral nervous systems.   Tetracyclic antidepressants such as mirtazepine and selective serotonin reuptake inhibitors (paroxetine, sertraline, fluoxetine) may also help some patients with severe itch including when it is caused by cholestasis, T-cell lymphoma, malignancy or a neuropathic condition.   Anti-epileptic drugs such as sodium valproate, gabapentin and pregabalin may also be of benefit to some patients, e.g., those with itch associated with renal failure or neuropathic itch. The mechanism of action is uncertain.   Opioid antagonists such as butorphanol intranasal spray, naltrexone  tablets, and naloxone have been effective in patients suffering from intractable pruritus in association with liver disease, atopic eczema and chronic urticaria. Nalfurafine, which is a kappa-opioid agonist has also been studied and shown to reduce itch associated with chronic renal impairment, however it is not widely available.   Aspirin is sometimes effective if pruritus is mediated by kinins or prostaglandins and is noted to be effective in patients with pruritus due to polycythaemia vera. Note: aspirin may cause or aggravate itch in some patients.   Thalidomide has been successful in treating nodular prurigo and chronic pruritus of various kinds but is rarely used because of serious adverse effects and expense.   Rifampicin is effective for patients with pruritus associated with cholestasis (some forms of liver disease).     Isolated case reports in severe itch associated with malignancy have reported success with the NKR1 antagonist, aprepitant (normally used short-term for postoperative or chemotherapy-induced nausea). This is under investigation for neuropathic itch and nodular prurigo.    Phototherapy  Broadband ultraviolet B or narrow-band UVB phototherapy alone, or in conjunction with UVA, has been shown to be helpful for pruritus associated with chronic kidney disease, psoriasis, atopic eczema and cutaneous T-cell lymphoma.    Behavioral therapy  Behavioral therapy may be used in conjunction with pharmacotherapy to modify behaviours such as coping mechanisms and stress reduction, which help interrupt the itch-scratch cycle. One randomized controlled trial showed short-term benefits in reduction in itch frequency and scratching as well as improvement in coping mechanisms.    What is the outcome for pruritus?  The management of chronic severe itch is difficult and often requires the use of combination therapy over a long period of time. Identification and treatment of underlying conditions causing pruritus  may help in this process. The symptom may quickly disappear or persist for long periods of time.

## 2024-04-05 ENCOUNTER — TELEPHONE (OUTPATIENT)
Age: 39
End: 2024-04-05

## 2024-04-05 NOTE — TELEPHONE ENCOUNTER
Pt called for an appt for today for a rash on her face. Advised that at this time I did not have any open appts. I offered first available on 4/18. Pt declined as she wanted to be seen today. She will call around for a sooner appt.

## 2024-04-08 DIAGNOSIS — F31.9 BIPOLAR AFFECTIVE DISORDER, REMISSION STATUS UNSPECIFIED (HCC): ICD-10-CM

## 2024-04-08 RX ORDER — PREGABALIN 200 MG/1
200 CAPSULE ORAL 3 TIMES DAILY
Qty: 90 CAPSULE | Refills: 0 | Status: SHIPPED | OUTPATIENT
Start: 2024-04-08 | End: 2024-05-08

## 2024-04-16 ENCOUNTER — OFFICE VISIT (OUTPATIENT)
Dept: URGENT CARE | Facility: CLINIC | Age: 39
End: 2024-04-16
Payer: COMMERCIAL

## 2024-04-16 VITALS
OXYGEN SATURATION: 98 % | DIASTOLIC BLOOD PRESSURE: 62 MMHG | TEMPERATURE: 97.3 F | SYSTOLIC BLOOD PRESSURE: 148 MMHG | RESPIRATION RATE: 18 BRPM | HEART RATE: 103 BPM

## 2024-04-16 DIAGNOSIS — R30.9 PAINFUL URINATION: ICD-10-CM

## 2024-04-16 DIAGNOSIS — N30.90 CYSTITIS WITHOUT HEMATURIA: Primary | ICD-10-CM

## 2024-04-16 LAB
SL AMB  POCT GLUCOSE, UA: 1000
SL AMB LEUKOCYTE ESTERASE,UA: ABNORMAL
SL AMB POCT BILIRUBIN,UA: ABNORMAL
SL AMB POCT BLOOD,UA: ABNORMAL
SL AMB POCT CLARITY,UA: ABNORMAL
SL AMB POCT COLOR,UA: ABNORMAL
SL AMB POCT KETONES,UA: ABNORMAL
SL AMB POCT NITRITE,UA: ABNORMAL
SL AMB POCT PH,UA: 6
SL AMB POCT SPECIFIC GRAVITY,UA: 1.02
SL AMB POCT URINE PROTEIN: ABNORMAL
SL AMB POCT UROBILINOGEN: 0.2

## 2024-04-16 PROCEDURE — 99213 OFFICE O/P EST LOW 20 MIN: CPT | Performed by: PHYSICIAN ASSISTANT

## 2024-04-16 PROCEDURE — 87086 URINE CULTURE/COLONY COUNT: CPT | Performed by: PHYSICIAN ASSISTANT

## 2024-04-16 PROCEDURE — 81002 URINALYSIS NONAUTO W/O SCOPE: CPT | Performed by: PHYSICIAN ASSISTANT

## 2024-04-16 RX ORDER — CIPROFLOXACIN 500 MG/1
500 TABLET, FILM COATED ORAL EVERY 24 HOURS
Qty: 3 TABLET | Refills: 0 | Status: SHIPPED | OUTPATIENT
Start: 2024-04-16 | End: 2024-04-19

## 2024-04-16 RX ORDER — CIPROFLOXACIN 500 MG/1
500 TABLET, FILM COATED ORAL EVERY 12 HOURS SCHEDULED
Qty: 6 TABLET | Refills: 0 | Status: SHIPPED | OUTPATIENT
Start: 2024-04-16 | End: 2024-04-16

## 2024-04-16 NOTE — PROGRESS NOTES
[facility]        NAME: Zunilda Mckay is a 38 y.o. female  : 1985    MRN: [id number]  DATE: 2024  TIME: 8:15 PM    Assessment and Plan   Cystitis without hematuria [N30.90]  1. Cystitis without hematuria  ciprofloxacin (CIPRO) 500 mg tablet    DISCONTINUED: ciprofloxacin (CIPRO) 500 mg tablet      2. Painful urination  POCT urine dip    Urine culture          I explained to the patient that her reported symptoms and physical examination would be consistent with a complicated UTI with the CVA tenderness however her UA is not consistent with this.  With the amount of glucosuria I would expect her to have more polyuria but she reports she is only urinated once today.  I cannot explain her symptoms fully based upon our urinalysis results and they are not consistent with her clinical presentation however based on her symptoms and this feeling like the UTIs follow-up for her in the past along with the CVA tenderness we will treat her with ciprofloxacin.  I advised her that we will contact her with culture results as well.  Proceed to the ER with any worsening symptoms any fevers chills nausea vomiting abdominal pain etc.  Possible she might be developing neurogenic bladder as well.  She should follow-up with her PCP if no improvement from the antibiotics.    Patient Instructions   There are no Patient Instructions on file for this visit.    Follow up with PCP in 3-5 days.  Proceed to  ER if symptoms worsen.    If tests are performed, our office will contact you with results only if   changes need to made to the care plan discussed with you at the visit.   [facility]     Chief Complaint     Chief Complaint   Patient presents with   • Possible UTI     Pt c/o flank pain and cannot pee that has been going on a while and has not taken any otc meds         History of Present Illness       HPI  Patient reports she has flank pain reports that she has  been having difficulty with urination.  She does have history of diabetes as well as polyneuropathy.  She reports burning as well as suprapubic tenderness with being she reports flank pain more on the right than the left.  She denies any fevers or chills.  Denies any nain blood in the urine.  She feels like she does always have to urinate.    Review of Systems   Review of Systems  All other related systems reviewed and are negative except as noted in HPI    Current Medications       Current Outpatient Medications:   •  Acetylcysteine 600 MG CAPS, Take 1 capsule (600 mg total) by mouth 2 (two) times a day, Disp: 60 capsule, Rfl: 2  •  amphetamine-dextroamphetamine (ADDERALL) 10 mg tablet, Take 10 mg by mouth 2 (two) times a day, Disp: , Rfl:   •  Blood Glucose Monitoring Suppl (OneTouch Verio Reflect) w/Device KIT, Check blood sugars three times daily. Please substitute with appropriate alternative as covered by patient's insurance. Dx: E11.65, Disp: 1 kit, Rfl: 0  •  chlorproMAZINE (THORAZINE) 10 mg tablet, daily, Disp: , Rfl:   •  ciprofloxacin (CIPRO) 500 mg tablet, Take 1 tablet (500 mg total) by mouth every 24 hours for 3 days, Disp: 3 tablet, Rfl: 0  •  Cyanocobalamin (B-12) 1000 MCG SUBL, Take 1 tablet (1,000 mcg total) by mouth in the morning, Disp: 90 tablet, Rfl: 0  •  ergocalciferol (VITAMIN D2) 50,000 units, , Disp: , Rfl: 0  •  escitalopram (LEXAPRO) 10 mg tablet, Take 10 mg by mouth 2 (two) times a day, Disp: , Rfl:   •  glucose blood (OneTouch Verio) test strip, Check blood sugars three times daily. Please substitute with appropriate alternative as covered by patient's insurance. Dx: E11.65, Disp: 300 each, Rfl: 0  •  hydrOXYzine HCL (ATARAX) 10 mg tablet, TAKE 1 TABLET BY MOUTH EVERY 6 HOURS AS NEEDED FOR ITCHING, Disp: 360 tablet, Rfl: 1  •  linaCLOtide (Linzess) 72 MCG CAPS, Take 1 capsule by mouth every morning, Disp: 90 capsule, Rfl: 1  •  loratadine (CLARITIN) 10 mg tablet, TAKE 1 TABLET BY  MOUTH EVERY DAY, Disp: 90 tablet, Rfl: 1  •  meclizine (ANTIVERT) 12.5 MG tablet, Take 1 tablet (12.5 mg total) by mouth 2 (two) times a day, Disp: 30 tablet, Rfl: 0  •  metFORMIN (GLUCOPHAGE) 1000 MG tablet, , Disp: , Rfl:   •  OneTouch Delica Lancets 33G MISC, Check blood sugars three times daily. Please substitute with appropriate alternative as covered by patient's insurance. Dx: E11.65, Disp: 300 each, Rfl: 0  •  pantoprazole (PROTONIX) 40 mg tablet, Take 1 tablet (40 mg total) by mouth 2 (two) times a day, Disp: 60 tablet, Rfl: 2  •  pregabalin (LYRICA) 200 MG capsule, Take 1 capsule (200 mg total) by mouth 3 (three) times a day, Disp: 90 capsule, Rfl: 0  •  ivermectin (STROMECTOL) 3 MG TABS, , Disp: , Rfl:   •  metroNIDAZOLE (METROGEL) 0.75 % gel, Apply topically 2 (two) times a day (Patient not taking: Reported on 2024), Disp: 45 g, Rfl: 0  •  Multiple Vitamin (MULTI-VITAMIN PO), Take by mouth, Disp: , Rfl:     Current Allergies     Allergies as of 2024   • (No Known Allergies)            The following portions of the patient's history were reviewed and updated as appropriate: allergies, current medications, past family history, past medical history, past social history, past surgical history and problem list.     Past Medical History:   Diagnosis Date   • Anemia     iron def   • Chronic pain disorder     feet   • Depression    • Diabetes mellitus (HCC)     neg since gastric bypass   • GERD (gastroesophageal reflux disease)    • Gilbert's syndrome    • Neuropathy     feet sheila   • Vitamin D deficiency        Past Surgical History:   Procedure Laterality Date   •  SECTION      X3   • CHOLECYSTECTOMY     • GASTRIC BYPASS     • HERNIA REPAIR     • VT EXCISION SKIN ABD INFRAUMBILICAL PANNICULECTOMY N/A 2020    Procedure: ABDOMINALPLASTY,CIRCUMFERENTIAL;  Surgeon: Modesto Macias MD;  Location:  MAIN OR;  Service: Plastics       Family History   Problem Relation Age of Onset   • Mental  "illness Mother    • Drug abuse Mother          Medications have been verified.        Objective   /62   Pulse 103   Temp (!) 97.3 °F (36.3 °C) (Tympanic)   Resp 18   SpO2 98%   No LMP recorded.       Physical Exam     Physical Exam  Constitutional:       General: She is not in acute distress.     Appearance: She is well-developed. She is not diaphoretic.   HENT:      Head: Normocephalic and atraumatic.   Eyes:      General: No scleral icterus.     Conjunctiva/sclera: Conjunctivae normal.   Neck:      Trachea: No tracheal deviation.   Cardiovascular:      Rate and Rhythm: Normal rate.   Pulmonary:      Effort: Pulmonary effort is normal. No respiratory distress.   Abdominal:      General: Abdomen is flat. There is no distension.      Tenderness: There is no abdominal tenderness. There is right CVA tenderness and left CVA tenderness (equivocal).   Musculoskeletal:      Cervical back: Normal range of motion and neck supple.   Lymphadenopathy:      Cervical: No cervical adenopathy.   Skin:     General: Skin is warm and dry.      Findings: No erythema.   Neurological:      Mental Status: She is alert and oriented to person, place, and time.   Psychiatric:         Behavior: Behavior normal.         Ortho Exam        Procedures  No Procedures performed today        Note: Portions of this record may have been created with voice recognition software. Occasional wrong word or \"sound a like\" substitutions may have occurred due to the inherent limitations of voice recognition software. Please read the chart carefully and recognize, using context, where substitutions have occurred.*      "

## 2024-04-19 LAB
BACTERIA UR CULT: ABNORMAL
BACTERIA UR CULT: ABNORMAL

## 2024-04-22 ENCOUNTER — DOCUMENTATION (OUTPATIENT)
Dept: NEPHROLOGY | Facility: CLINIC | Age: 39
End: 2024-04-22

## 2024-04-22 NOTE — PROGRESS NOTES
Left message on patient's sister phone (could  not leave message on patient's numbers).  Advised that Dr. Matthews reviewed her chart.  Kidney function is normal.  For her problem, patient needs evaluation with Urology, not nephrology.  Accordingly, we will cancel her new patient appt.

## 2024-04-23 ENCOUNTER — RA CDI HCC (OUTPATIENT)
Dept: OTHER | Facility: HOSPITAL | Age: 39
End: 2024-04-23

## 2024-04-25 DIAGNOSIS — B88.0 INFESTATION BY DEMODEX: ICD-10-CM

## 2024-04-25 DIAGNOSIS — L29.9 PRURITUS: ICD-10-CM

## 2024-04-26 DIAGNOSIS — B88.0 INFESTATION BY DEMODEX: ICD-10-CM

## 2024-04-26 DIAGNOSIS — L29.9 PRURITUS: ICD-10-CM

## 2024-04-26 RX ORDER — METRONIDAZOLE 7.5 MG/G
GEL TOPICAL 2 TIMES DAILY
Qty: 45 G | Refills: 0 | Status: SHIPPED | OUTPATIENT
Start: 2024-04-26 | End: 2024-07-25

## 2024-05-06 DIAGNOSIS — F31.9 BIPOLAR AFFECTIVE DISORDER, REMISSION STATUS UNSPECIFIED (HCC): ICD-10-CM

## 2024-05-06 RX ORDER — PREGABALIN 200 MG/1
200 CAPSULE ORAL 3 TIMES DAILY
Qty: 90 CAPSULE | Refills: 0 | Status: SHIPPED | OUTPATIENT
Start: 2024-05-06 | End: 2024-06-05

## 2024-05-07 ENCOUNTER — TELEPHONE (OUTPATIENT)
Dept: OTHER | Facility: OTHER | Age: 39
End: 2024-05-07

## 2024-05-07 NOTE — TELEPHONE ENCOUNTER
Called and left a message for the patient to give us a call back so we could get her scheduled in the next available spot for infection on the face

## 2024-05-07 NOTE — TELEPHONE ENCOUNTER
[phone]  Zunilda want to make an apt with Dermatologist for an infection on her face.  She stated she saw Derm for this last time.

## 2024-06-10 ENCOUNTER — TELEPHONE (OUTPATIENT)
Dept: INTERNAL MEDICINE CLINIC | Facility: CLINIC | Age: 39
End: 2024-06-10

## 2024-06-10 NOTE — TELEPHONE ENCOUNTER
Tried contacting patient on both mobile and home numbers both are disconnected, however I sent a [employer] message asking patient to please contact the office to schedule follow up appointment with Dr. Multani.

## 2024-07-10 DIAGNOSIS — F31.9 BIPOLAR AFFECTIVE DISORDER, REMISSION STATUS UNSPECIFIED (HCC): ICD-10-CM

## 2024-07-10 RX ORDER — PREGABALIN 200 MG/1
200 CAPSULE ORAL 3 TIMES DAILY
Qty: 90 CAPSULE | Refills: 0 | Status: SHIPPED | OUTPATIENT
Start: 2024-07-10 | End: 2024-08-09

## 2024-07-12 DIAGNOSIS — L29.9 PRURITUS: ICD-10-CM

## 2024-07-12 DIAGNOSIS — B88.0 INFESTATION BY DEMODEX: ICD-10-CM

## 2024-07-12 RX ORDER — METRONIDAZOLE 7.5 MG/G
GEL TOPICAL 2 TIMES DAILY
Qty: 45 G | Refills: 0 | Status: SHIPPED | OUTPATIENT
Start: 2024-07-12 | End: 2024-10-10

## 2024-08-08 ENCOUNTER — VBI (OUTPATIENT)
Dept: ADMINISTRATIVE | Facility: OTHER | Age: 39
End: 2024-08-08

## 2024-09-13 ENCOUNTER — TELEPHONE (OUTPATIENT)
Dept: INTERNAL MEDICINE CLINIC | Facility: CLINIC | Age: 39
End: 2024-09-13

## 2024-09-13 NOTE — TELEPHONE ENCOUNTER
Taty from [facility] called about our pt reid gonzalez.    Pt had a detox recently, maybe a few days ago they claim, and she is there with them now for the recovery process.    Detox seemed to have her on an insulin as per recovery center but they are in [place] and aren't classified as a medical facility so they need medical management from the pcp on her Sugars, metformin, sliding scale things.    Pt is currently refusing insulin and they some guidance / direction on this.     I asked them to send a release over in the meantime also   zion

## 2024-09-26 DIAGNOSIS — F31.9 BIPOLAR AFFECTIVE DISORDER, REMISSION STATUS UNSPECIFIED (HCC): ICD-10-CM

## 2024-09-26 RX ORDER — PREGABALIN 200 MG/1
200 CAPSULE ORAL 3 TIMES DAILY
Qty: 90 CAPSULE | Refills: 0 | Status: SHIPPED | OUTPATIENT
Start: 2024-09-26 | End: 2024-10-26

## 2024-12-13 ENCOUNTER — VBI (OUTPATIENT)
Dept: ADMINISTRATIVE | Facility: OTHER | Age: 39
End: 2024-12-13

## 2024-12-13 NOTE — TELEPHONE ENCOUNTER
12/13/24 3:17 PM     Chart reviewed for Diabetic Eye Exam was/were not submitted to the patient's insurance.     Rocio Smith MA   [facility]

## (undated) DEVICE — KERLIX BANDAGE ROLL: Brand: KERLIX

## (undated) DEVICE — STERILE POLYISOPRENE POWDER-FREE SURGICAL GLOVES: Brand: PROTEXIS

## (undated) DEVICE — SYRINGE 3ML LL

## (undated) DEVICE — SYRINGE 10ML LL CONTROL TOP

## (undated) DEVICE — NEEDLE FILTER 5 MICR 19G X 1.5IN

## (undated) DEVICE — INTENDED FOR TISSUE SEPARATION, AND OTHER PROCEDURES THAT REQUIRE A SHARP SURGICAL BLADE TO PUNCTURE OR CUT.: Brand: BARD-PARKER ® SAFETYLOCK CARBON RIB-BACK BLADES

## (undated) DEVICE — TUBING SUCTION 5MM X 12 FT

## (undated) DEVICE — CANNISTER WASTE IMPLOSION PROOF

## (undated) DEVICE — DRESSING XEROFORM 5 X 9

## (undated) DEVICE — SUT ETHILON 3-0 PS-1 18 IN 1663H

## (undated) DEVICE — INTENDED FOR TISSUE SEPARATION, AND OTHER PROCEDURES THAT REQUIRE A SHARP SURGICAL BLADE TO PUNCTURE OR CUT.: Brand: BARD-PARKER SAFETY BLADES SIZE 11, STERILE

## (undated) DEVICE — COTTON TIP APPLICTOR 2 PK

## (undated) DEVICE — TABLE COVER: Brand: CONVERTORS

## (undated) DEVICE — DRAPE SHEET THREE QUARTER

## (undated) DEVICE — SUT VICRYL 4-0 PS-2 18 IN J496G

## (undated) DEVICE — STERILE POLYISOPRENE POWDER-FREE SURGICAL GLOVES WITH EMOLLIENT COATING: Brand: PROTEXIS

## (undated) DEVICE — TUBING INFILTRATION 9FT

## (undated) DEVICE — JACKSON-PRATT 100CC BULB RESERVOIR: Brand: CARDINAL HEALTH

## (undated) DEVICE — SKIN MARKER DUAL TIP WITH RULER CAP, FLEXIBLE RULER AND LABELS: Brand: DEVON

## (undated) DEVICE — SUT PROLENE 3-0 PC-5 18 IN 8632G

## (undated) DEVICE — DRAIN CHANNEL RND FULL FLUTED 19FR W/TROCAR

## (undated) DEVICE — 4MM SINGLE USE CANNULA, 10MM PORT, 30CM LONG, TRI-PORT II: Brand: MICROAIRE®

## (undated) DEVICE — DRAPE TOWEL: Brand: CONVERTORS

## (undated) DEVICE — 3M™ STERI-STRIP™ REINFORCED ADHESIVE SKIN CLOSURES, R1547, 1/2 IN X 4 IN (12 MM X 100 MM), 6 STRIPS/ENVELOPE: Brand: 3M™ STERI-STRIP™

## (undated) DEVICE — 1820 FOAM BLOCK NEEDLE COUNTER: Brand: DEVON

## (undated) DEVICE — NEEDLE 25G X 1 1/2

## (undated) DEVICE — SUT ETHILON 5-0 PS-2 18 IN 1666H

## (undated) DEVICE — PENCIL SMOKE EVAC TELESCOPING W/TUBING

## (undated) DEVICE — LAMINECTOMY ARM CRADLE FOAM POSITIONER: Brand: CARDINAL HEALTH

## (undated) DEVICE — STANDARD SURGICAL GOWN, L: Brand: CONVERTORS

## (undated) DEVICE — CHEST/BREAST DRAPE: Brand: CONVERTORS

## (undated) DEVICE — SUT ETHIBOND 0 CT-2 30 IN X412H

## (undated) DEVICE — TAPE TRANSPORE CLEAR 2 IN

## (undated) DEVICE — SPONGE LAP 18 X 18 IN STRL RFD

## (undated) DEVICE — PACK UNIVERSAL DRAPES SUB-Q ICD

## (undated) DEVICE — SMOKE EVACUATION TUBING WITH 7/8" HOSE TO HOSE CONNECTOR: Brand: BUFFALO FILTER

## (undated) DEVICE — SUT VICRYL 2-0 J459H

## (undated) DEVICE — TRAY FOLEY 16FR URIMETER SURESTEP

## (undated) DEVICE — SCD SEQUENTIAL COMPRESSION COMFORT SLEEVE MEDIUM KNEE LENGTH: Brand: KENDALL SCD

## (undated) DEVICE — 4-PORT MANIFOLD: Brand: NEPTUNE 2

## (undated) DEVICE — TUBING LIPOSUCTION ASPIRATION 12FT STERILE

## (undated) DEVICE — ASTOUND STANDARD SURGICAL GOWN, XL: Brand: CONVERTORS

## (undated) DEVICE — WET SKIN PREP TRAY: Brand: MEDLINE INDUSTRIES, INC.